# Patient Record
Sex: FEMALE | Race: WHITE | NOT HISPANIC OR LATINO | Employment: OTHER | URBAN - METROPOLITAN AREA
[De-identification: names, ages, dates, MRNs, and addresses within clinical notes are randomized per-mention and may not be internally consistent; named-entity substitution may affect disease eponyms.]

---

## 2017-06-29 ENCOUNTER — TRANSFERRED RECORDS (OUTPATIENT)
Dept: HEALTH INFORMATION MANAGEMENT | Facility: CLINIC | Age: 77
End: 2017-06-29

## 2017-07-10 ENCOUNTER — OFFICE VISIT (OUTPATIENT)
Dept: UROLOGY | Facility: CLINIC | Age: 77
End: 2017-07-10
Payer: MEDICARE

## 2017-07-10 VITALS
HEIGHT: 59 IN | WEIGHT: 115 LBS | DIASTOLIC BLOOD PRESSURE: 64 MMHG | BODY MASS INDEX: 23.18 KG/M2 | HEART RATE: 60 BPM | SYSTOLIC BLOOD PRESSURE: 120 MMHG

## 2017-07-10 DIAGNOSIS — R30.0 DYSURIA: ICD-10-CM

## 2017-07-10 DIAGNOSIS — R30.0 DYSURIA: Primary | ICD-10-CM

## 2017-07-10 LAB
ALBUMIN UR-MCNC: NEGATIVE MG/DL
APPEARANCE UR: CLEAR
BILIRUB UR QL STRIP: NEGATIVE
COLOR UR AUTO: YELLOW
GLUCOSE UR STRIP-MCNC: NEGATIVE MG/DL
HGB UR QL STRIP: ABNORMAL
KETONES UR STRIP-MCNC: NEGATIVE MG/DL
LEUKOCYTE ESTERASE UR QL STRIP: ABNORMAL
NITRATE UR QL: NEGATIVE
PH UR STRIP: 5.5 PH (ref 5–7)
RESIDUAL VOLUME (RV) (EXTERNAL): 50
SP GR UR STRIP: <=1.005 (ref 1–1.03)
URN SPEC COLLECT METH UR: ABNORMAL
UROBILINOGEN UR STRIP-ACNC: 0.2 EU/DL (ref 0.2–1)

## 2017-07-10 PROCEDURE — 99212 OFFICE O/P EST SF 10 MIN: CPT | Mod: 25 | Performed by: PHYSICIAN ASSISTANT

## 2017-07-10 PROCEDURE — 51798 US URINE CAPACITY MEASURE: CPT | Performed by: PHYSICIAN ASSISTANT

## 2017-07-10 PROCEDURE — 81003 URINALYSIS AUTO W/O SCOPE: CPT | Performed by: PHYSICIAN ASSISTANT

## 2017-07-10 PROCEDURE — 87086 URINE CULTURE/COLONY COUNT: CPT | Performed by: PHYSICIAN ASSISTANT

## 2017-07-10 RX ORDER — PHENAZOPYRIDINE HYDROCHLORIDE 100 MG/1
200 TABLET, FILM COATED ORAL 3 TIMES DAILY PRN
Qty: 12 TABLET | Refills: 0 | Status: SHIPPED | OUTPATIENT
Start: 2017-07-10 | End: 2022-01-09

## 2017-07-10 ASSESSMENT — PAIN SCALES - GENERAL: PAINLEVEL: MILD PAIN (3)

## 2017-07-10 NOTE — MR AVS SNAPSHOT
"              After Visit Summary   7/10/2017    Maria E Webster    MRN: [id number]           Patient Information     Date Of Birth          1940        Visit Information        Provider Department      7/10/2017 10:30 AM Kristy Irby PA-C [facility] [place]        Today's Diagnoses     Dysuria           Follow-ups after your visit        Who to contact     If you have questions or need follow up information about today's clinic visit or your schedule please contact [facility] [place] directly at [phone].  Normal or non-critical lab and imaging results will be communicated to you by Architurnhart, letter or phone within 4 business days after the clinic has received the results. If you do not hear from us within 7 days, please contact the clinic through CricHQt or phone. If you have a critical or abnormal lab result, we will notify you by phone as soon as possible.  Submit refill requests through Gr[employer] Eloise or call your pharmacy and they will forward the refill request to us. Please allow 3 business days for your refill to be completed.          Additional Information About Your Visit        MyChart Information     [employer] lets you send messages to your doctor, view your test results, renew your prescriptions, schedule appointments and more. To sign up, go to [email or web address] . Click on \"Log in\" on the left side of the screen, which will take you to the Welcome page. Then click on \"Sign up Now\" on the right side of the page.     You will be asked to enter the access code listed below, as well as some personal information. Please follow the directions to create your username and password.     Your access code is: [email or web address]  Expires: 10/8/2017 11:38 AM     Your access code will  in 90 days. If you need help or a new code, please call your [place] clinic or [phone].        Care EveryWhere ID     This is your Care EveryWhere " "ID. This could be used by other organizations to access your [facility] medical records  [id number]        Your Vitals Were     Pulse Height BMI (Body Mass Index)             60 1.499 m (4' 11\") 23.23 kg/m2          Blood Pressure from Last 3 Encounters:   07/10/17 120/64   05/09/12 120/60   08/27/03 132/80    Weight from Last 3 Encounters:   07/10/17 52.2 kg (115 lb)   05/09/12 56 kg (123 lb 7.3 oz)   08/27/03 58.7 kg (129 lb 8 oz)              We Performed the Following     MEASURE POST-VOID RESIDUAL URINE/BLADDER CAPACITY, US NON-IMAGING (01193)     UA without Microscopic [[id number]]     Urine Culture Aerobic Bacterial          Today's Medication Changes          These changes are accurate as of: 7/10/17 11:38 AM.  If you have any questions, ask your nurse or doctor.               Start taking these medicines.        Dose/Directions    phenazopyridine 100 MG tablet   Commonly known as:  PYRIDIUM   Used for:  Dysuria   Started by:  Kristy Irby PA-C        Dose:  200 mg   Take 2 tablets (200 mg) by mouth 3 times daily as needed for urinary tract discomfort   Quantity:  12 tablet   Refills:  0            Where to get your medicines      These medications were sent to [facility] [address] [place] [address] & [address], [place] [address]     Phone:  [phone]     phenazopyridine 100 MG tablet                Primary Care Provider Office Phone # Fax #    Hernandez Mena [phone]       [facility] [address] [address]        Equal Access to Services     WILLAM KOVACS AH: Hadii marialuisa Regalado, wabelindada luqadaha, qaybta kaalmada tatum, kayce galeano. So [facility] [phone].    ATENCIÓN: Si habla español, tiene a clements disposición servicios gratuitos de asistencia lingüística. Llame al [phone].    We comply with applicable federal civil rights laws and " [place] laws. We do not discriminate on the basis of race, color, national origin, age, disability sex, sexual orientation or gender identity.            Thank you!     Thank you for choosing [facility] [place]  for your care. Our goal is always to provide you with excellent care. Hearing back from our patients is one way we can continue to improve our services. Please take a few minutes to complete the written survey that you may receive in the mail after your visit with us. Thank you!             Your Updated Medication List - Protect others around you: Learn how to safely use, store and throw away your medicines at www.disposemymeds.org.          This list is accurate as of: 7/10/17 11:38 AM.  Always use your most recent med list.                   Brand Name Dispense Instructions for use Diagnosis    ASPIRIN EC PO      Take 81 mg by mouth.        CITRACAL + D 315-200 MG-UNIT Tabs per tablet   Generic drug:  calcium citrate-vitamin D      650mg DAILY        COLACE 100 MG capsule   Generic drug:  docusate sodium      Take 100 mg by mouth 2 times daily.        ESTRACE VAGINAL 0.1 MG/GM cream   Generic drug:  estradiol     42.5 g    Use dime size amount to vagina nightly for 2 weeks and then M/W/F night thereafter        METOPROLOL TARTRATE      12.5 mg 2 times daily.        Multi-vitamin Tabs tablet   Generic drug:  multivitamin, therapeutic with minerals      Take 1 tablet by mouth daily.        mycophenolate tablet      Take 1,000 mg by mouth 2 times daily.        OMEPRAZOLE      40 mg daily.        phenazopyridine 100 MG tablet    PYRIDIUM    12 tablet    Take 2 tablets (200 mg) by mouth 3 times daily as needed for urinary tract discomfort    Dysuria       polyethylene glycol Packet    MIRALAX/GLYCOLAX     Take 1 packet by mouth daily as needed.        prednisoLONE 5 MG tablet      Take 1 tablet by mouth daily.        sennosides 8.6 MG tablet    SENOKOT     Take 1 tablet by  mouth daily as needed.        simvastatin 20 MG tablet    ZOCOR     Take 10 mg by mouth At Bedtime        vitamin D 400 UNITS capsule      Take 1 capsule by mouth daily.

## 2017-07-10 NOTE — LETTER
7/10/2017       RE: Maria E Webster  [address]  [facility] [address]     Dear Colleague,    Thank you for referring your patient, Maria E Webster, to the [facility] [place] at [facility]. Please see a copy of my visit note below.    CC: UTI symptoms, overdue for f/u with Dr. Nagel    HPI: Patient follows with Dr. Nagel for a history of Susan and hematuria.  She had a cysto at her last visit. Urine cytology was negative at that time. She [place] closely with [place] [facility]. Cr 0.58 3/14/17.  She denies any major changes in her health since last visit.    Today she is seen for an interim visit due to dysuria.   Had a UTI diagnosed as outpt and was placed on Cipro x 3 days. Symptoms continued and was treated with Bactrim course completed last Friday (14 days). No fevers or chills. Continues to have pain at the end of her stream and some bladder cramping. Continues to see blood in her urine. GYN removed pessary two weeks ago to see if there would be improvement of her symptoms. No change and she is having it replaced this afternoon.       Past Medical History:   Diagnosis Date     Arthritis      Diverticulosis of colon (without mention of hemorrhage)      Hernia, abdominal      Polycystic kidney, unspecified type     Kidney transplant     PONV (postoperative nausea and vomiting)      Rectocele      Unspecified essential hypertension      Past Surgical History:   Procedure Laterality Date     C TRANSPLANTATION OF KIDNEY      left     C VAGINAL HYSTERECTOMY  1996    prolapse uterus, bilat. S&O     CYSTOSCOPY       HC COLONOSCOPY THRU STOMA, DIAGNOSTIC  5-03    few diverticulae     HC CORRECT BUNION,SIMPLE       HEMORRHOIDECTOMY       HERNIA REPAIR       TYMPANOPLASTY  5/9/2012    Procedure:TYMPANOPLASTY; Left Tympanoplasty ; Surgeon:BROOKE SANCHEZ; Location:[facility] OR        Allergies   Allergen Reactions     Codeine Nausea and  Vomiting     Darvocet [Propoxyphene N-Apap] Nausea and Vomiting     Morphine Nausea and Vomiting     Current Outpatient Prescriptions   Medication     estradiol (ESTRACE VAGINAL) 0.1 MG/GM vaginal cream     METOPROLOL TARTRATE     mycophenolate (CELLCEPT-BRAND NAME) 500 MG tablet     prednisoLONE 5 MG TABS     simvastatin (ZOCOR) 20 MG tablet     OMEPRAZOLE     ASPIRIN EC PO     Multiple Vitamin (MULTI-VITAMIN) per tablet     polyethylene glycol (MIRALAX/GLYCOLAX) packet     docusate sodium (COLACE) 100 MG capsule     sennosides (SENOKOT) 8.6 MG tablet     Cholecalciferol (VITAMIN D) 400 UNIT capsule     CITRACAL + D 315-200 MG-IU OR TABS     No current facility-administered medications for this visit.      GEN: NAD,  HEENT: EOMI  NECK: Supple  ABD: Obese, soft  EXT: No LE edema    PVR 50cc    A/P: 75 year old F with history notable for PCKD s/p renal transplant, s/p hysterectomy vault prolapse, atrophic vaginitis, Susan and gross hematuria, now with frequency and burning.    -U/A, U/C (declined cath specimen and is not currently using pessary). We discussed the findings from the evaluation did not show any obvious source of infection we would observe at this time. Short course of pyridium.   -Overdue to see Dr. Nagel, schedule today.     She was reminded of the importance of submitting a urine again in the future if she has an infection, preferably a catheterized specimen to avoid contamination from her pessary (GYN replacing it today).     Kristy Irby PA-C  [facility]  20 min spent with the patient in a face-to-face manner, >50% of the time spent on counseling and coordination of care.      CC  Patient Care Team:  Hernandez Mena MD as PCP - General (Nephrology)  [facility] transplant team    Again, thank you for allowing me to participate in the care of your patient.      Sincerely,    Kristy Irby PA-C, NITISH

## 2017-07-10 NOTE — PROGRESS NOTES
CC: UTI symptoms, overdue for f/u with Dr. Nagel    HPI: Patient follows with Dr. Nagel for a history of Susan and hematuria.  She had a cysto at her last visit. Urine cytology was negative at that time. [place] [facility] Cr 0.58 3/14/17.  She denies any major changes in her health since last visit.    Today she is seen for an interim visit due to dysuria.   Had a UTI diagnosed as outpt and was placed on Cipro x 3 days. Symptoms continued and was treated with Bactrim course completed last Friday (14 days). No fevers or chills. Continues to have pain at the end of her stream and some bladder cramping. Continues to see blood in her urine. GYN removed pessary two weeks ago to see if there would be improvement of her symptoms. No change and she is having it replaced this afternoon.       Past Medical History:   Diagnosis Date     Arthritis      Diverticulosis of colon (without mention of hemorrhage)      Hernia, abdominal      Polycystic kidney, unspecified type     Kidney transplant     PONV (postoperative nausea and vomiting)      Rectocele      Unspecified essential hypertension      Past Surgical History:   Procedure Laterality Date     C TRANSPLANTATION OF KIDNEY      left     C VAGINAL HYSTERECTOMY  1996    prolapse uterus, bilat. S&O     CYSTOSCOPY       HC COLONOSCOPY THRU STOMA, DIAGNOSTIC  5-03    few diverticulae     HC CORRECT BUNION,SIMPLE       HEMORRHOIDECTOMY       HERNIA REPAIR       TYMPANOPLASTY  5/9/2012    Procedure:TYMPANOPLASTY; Left Tympanoplasty ; Surgeon:BROOKE SANCHEZ; Location:[facility] OR        Allergies   Allergen Reactions     Codeine Nausea and Vomiting     Darvocet [Propoxyphene N-Apap] Nausea and Vomiting     Morphine Nausea and Vomiting     Current Outpatient Prescriptions   Medication     estradiol (ESTRACE VAGINAL) 0.1 MG/GM vaginal cream     METOPROLOL TARTRATE     mycophenolate (CELLCEPT-BRAND NAME) 500 MG tablet     prednisoLONE 5 MG TABS      simvastatin (ZOCOR) 20 MG tablet     OMEPRAZOLE     ASPIRIN EC PO     Multiple Vitamin (MULTI-VITAMIN) per tablet     polyethylene glycol (MIRALAX/GLYCOLAX) packet     docusate sodium (COLACE) 100 MG capsule     sennosides (SENOKOT) 8.6 MG tablet     Cholecalciferol (VITAMIN D) 400 UNIT capsule     CITRACAL + D 315-200 MG-IU OR TABS     No current facility-administered medications for this visit.      GEN: NAD,  HEENT: EOMI  NECK: Supple  ABD: Obese, soft  EXT: No LE edema    PVR 50cc    A/P: 75 year old F with history notable for PCKD s/p renal transplant, s/p hysterectomy vault prolapse, atrophic vaginitis, Susan and gross hematuria, now with frequency and burning.    -U/A, U/C (declined cath specimen and is not currently using pessary). We discussed the findings from the evaluation did not show any obvious source of infection we would observe at this time. Short course of pyridium.   -Overdue to see Dr. Nagel, schedule today.     She was reminded of the importance of submitting a urine again in the future if she has an infection, preferably a catheterized specimen to avoid contamination from her pessary (GYN replacing it today).     Kristy Irby PA-C  [facility]  20 min spent with the patient in a face-to-face manner, >50% of the time spent on counseling and coordination of care.      CC  Patient Care Team:  Hernandez Mena MD as PCP - General (Nephrology)  Hernandez Mena MD as MD (Nephrology)  [facility] transplant team

## 2017-07-10 NOTE — LETTER
7/10/2017      RE: Maria E Webster  [address] [place] [address]       CC: UTI symptoms, overdue for f/u with Dr. Nagel    HPI: Patient follows with Dr. Nagel for a history of Susan and hematuria.  She had a cysto at her last visit. Urine cytology was negative at that time. She follows closely with her renal transplant team at [facility]. Cr 0.58 3/14/17.  She denies any major changes in her health since last visit.    Today she is seen for an interim visit due to dysuria.   Had a UTI diagnosed as outpt and was placed on Cipro x 3 days. Symptoms continued and was treated with Bactrim course completed last Friday (14 days). No fevers or chills. Continues to have pain at the end of her stream and some bladder cramping. Continues to see blood in her urine. GYN removed pessary two weeks ago to see if there would be improvement of her symptoms. No change and she is having it replaced this afternoon.       Past Medical History:   Diagnosis Date     Arthritis      Diverticulosis of colon (without mention of hemorrhage)      Hernia, abdominal      Polycystic kidney, unspecified type     Kidney transplant     PONV (postoperative nausea and vomiting)      Rectocele      Unspecified essential hypertension      Past Surgical History:   Procedure Laterality Date     C TRANSPLANTATION OF KIDNEY      left     C VAGINAL HYSTERECTOMY  1996    prolapse uterus, bilat. S&O     CYSTOSCOPY       HC COLONOSCOPY THRU STOMA, DIAGNOSTIC  5-03    few diverticulae     HC CORRECT BUNION,SIMPLE       HEMORRHOIDECTOMY       HERNIA REPAIR       TYMPANOPLASTY  5/9/2012    Procedure:TYMPANOPLASTY; Left Tympanoplasty ; Surgeon:BROOKE SANCHEZ; Location:[facility] OR        Allergies   Allergen Reactions     Codeine Nausea and Vomiting     Darvocet [Propoxyphene N-Apap] Nausea and Vomiting     Morphine Nausea and Vomiting     Current Outpatient Prescriptions   Medication     estradiol (ESTRACE VAGINAL) 0.1 MG/GM vaginal cream     METOPROLOL TARTRATE      mycophenolate (CELLCEPT-BRAND NAME) 500 MG tablet     prednisoLONE 5 MG TABS     simvastatin (ZOCOR) 20 MG tablet     OMEPRAZOLE     ASPIRIN EC PO     Multiple Vitamin (MULTI-VITAMIN) per tablet     polyethylene glycol (MIRALAX/GLYCOLAX) packet     docusate sodium (COLACE) 100 MG capsule     sennosides (SENOKOT) 8.6 MG tablet     Cholecalciferol (VITAMIN D) 400 UNIT capsule     CITRACAL + D 315-200 MG-IU OR TABS     No current facility-administered medications for this visit.      GEN: NAD,  HEENT: EOMI  NECK: Supple  ABD: Obese, soft  EXT: No LE edema    PVR 50cc    A/P: 75 year old F with history notable for PCKD s/p renal transplant, s/p hysterectomy vault prolapse, atrophic vaginitis, Susan and gross hematuria, now with frequency and burning.    -U/A, U/C (declined cath specimen and is not currently using pessary). We discussed the findings from the evaluation did not show any obvious source of infection we would observe at this time. Short course of pyridium.   -Overdue to see Dr. Nagel, schedule today.     She was reminded of the importance of submitting a urine again in the future if she has an infection, preferably a catheterized specimen to avoid contamination from her pessary (GYN replacing it today).     Kristy Irby PA-C  [facility]  20 min spent with the patient in a face-to-face manner, >50% of the time spent on counseling and coordination of care.      CC  Patient Care Team:  Hernandez Mena MD as PCP - General (Nephrology)  [facility] transplant team

## 2017-07-11 LAB
BACTERIA SPEC CULT: NORMAL
Lab: NORMAL
MICRO REPORT STATUS: NORMAL
SPECIMEN SOURCE: NORMAL

## 2017-07-19 ENCOUNTER — OFFICE VISIT (OUTPATIENT)
Dept: UROLOGY | Facility: CLINIC | Age: 77
End: 2017-07-19
Payer: MEDICARE

## 2017-07-19 VITALS
WEIGHT: 115 LBS | BODY MASS INDEX: 23.18 KG/M2 | HEIGHT: 59 IN | SYSTOLIC BLOOD PRESSURE: 108 MMHG | HEART RATE: 70 BPM | DIASTOLIC BLOOD PRESSURE: 62 MMHG

## 2017-07-19 DIAGNOSIS — Z87.448 HISTORY OF HEMATURIA: ICD-10-CM

## 2017-07-19 DIAGNOSIS — Q61.3 POLYCYSTIC KIDNEY: ICD-10-CM

## 2017-07-19 DIAGNOSIS — Z87.440 PERSONAL HISTORY OF URINARY TRACT INFECTION: Primary | ICD-10-CM

## 2017-07-19 DIAGNOSIS — R32 UNSPECIFIED URINARY INCONTINENCE: ICD-10-CM

## 2017-07-19 DIAGNOSIS — Z94.0 STATUS POST KIDNEY TRANSPLANT: ICD-10-CM

## 2017-07-19 DIAGNOSIS — N81.9 VAGINAL VAULT PROLAPSE: ICD-10-CM

## 2017-07-19 LAB
ALBUMIN UR-MCNC: NEGATIVE MG/DL
APPEARANCE UR: CLEAR
BILIRUB UR QL STRIP: NEGATIVE
COLOR UR AUTO: YELLOW
GLUCOSE UR STRIP-MCNC: NEGATIVE MG/DL
HGB UR QL STRIP: NEGATIVE
KETONES UR STRIP-MCNC: NEGATIVE MG/DL
LEUKOCYTE ESTERASE UR QL STRIP: NEGATIVE
NITRATE UR QL: NEGATIVE
PH UR STRIP: 6 PH (ref 5–7)
SP GR UR STRIP: 1.02 (ref 1–1.03)
URN SPEC COLLECT METH UR: NORMAL
UROBILINOGEN UR STRIP-ACNC: 0.2 EU/DL (ref 0.2–1)

## 2017-07-19 PROCEDURE — 81003 URINALYSIS AUTO W/O SCOPE: CPT | Performed by: UROLOGY

## 2017-07-19 PROCEDURE — 88112 CYTOPATH CELL ENHANCE TECH: CPT | Performed by: UROLOGY

## 2017-07-19 PROCEDURE — 99213 OFFICE O/P EST LOW 20 MIN: CPT | Performed by: UROLOGY

## 2017-07-19 PROCEDURE — 88112 CYTOPATH CELL ENHANCE TECH: CPT | Mod: 26 | Performed by: UROLOGY

## 2017-07-19 ASSESSMENT — PAIN SCALES - GENERAL: PAINLEVEL: NO PAIN (0)

## 2017-07-19 NOTE — NURSING NOTE
Chief Complaint   Patient presents with     UTI     Patient is here for a follow up for recurrent UTI's in the past. Patient said she had a little burning this morning with urination      Brennen Young LPN 11:57 AM July 19, 2017

## 2017-07-19 NOTE — PROGRESS NOTES
"July 19, 2017    Return visit    Patient returns today for follow up.  She was last seen by me in 1/2016 for cystoscopy for gross hematuria which did not reveal anything remarkable.  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  She was doing well until about one month ago when she began to have UTI symptoms and gross hematuria-urine culture was positive for E coli. Does note she decreased the frequency of the vaginal estrogen cream to only once a week.   Despite being on the appropriate antibiotics she states that it took another round of antibiotics to make her symptoms go away.  States that the specimens were voided, sometimes in a hat.  She states that currently after a couple days of pyridium a little over a week ago the only persistent symptom is dysuria with her first AM void, rarely during other parts of the day.  No further hematuria but she is concerned because her sister had bladder cancer. She denies any other changes in her health since last visit.    /62  Pulse 70  Ht 1.499 m (4' 11\")  Wt 52.2 kg (115 lb)  Breastfeeding? No  BMI 23.23 kg/m2  She is comfortable, in no distress, non-labored breathing.      Catheterized urine specimen negative    A/P: 76 year old F with PCKD s/p renal transplant, post hysterectomy vault prolapse, atrophic vaginitis, Susan and gross hematuria    Given the hematuria and family history of bladder cancer a urine cytology was sent today    We discussed repeat cystoscopy today and she wishes to hold off until the urine cytology results return    Discussed that since the only symptom is first thing in the AM she can try to see if not letting her bladder get so full overnight (ie wake up a little earlier to void)  may help that sensation    She is asked to increase her vaginal estrogen cream back to 2x a week    RTC 3-4 months, sooner if cytology abnormal or if needed (will schedule for a possible cystoscopy)    15 minutes were spent with " the patient today, > 50% in counseling and coordination of care    Vira Nagel MD MPH   of Urology    CC  Patient Care Team:  Hernandez Mena MD as PCP - General (Nephrology)  Hernandez Mena MD as MD (Nephrology)

## 2017-07-19 NOTE — PATIENT INSTRUCTIONS
CATHETERIZED urine if concern for infection    We will let you know if your urine test is concerning for needing more tests    Estrace cream 2x a week    Return to see us 3-4 months, sooner if needed for possible cystoscopy

## 2017-07-19 NOTE — LETTER
"7/19/2017       RE: Maria E Webster  [address]  [facility] [address]     Dear Colleague,    Thank you for referring your patient, Maria E Webster, to the [facility] [place] at [facility]. Please see a copy of my visit note below.    July 19, 2017    Return visit    Patient returns today for follow up.  She was last seen by me in 1/2016 for cystoscopy for gross hematuria which did not reveal anything remarkable.  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  She was doing well until about one month ago when she began to have UTI symptoms and gross hematuria-urine culture was positive for E coli. Does note she decreased the frequency of the vaginal estrogen cream to only once a week.   Despite being on the appropriate antibiotics she states that it took another round of antibiotics to make her symptoms go away.  States that the specimens were voided, sometimes in a hat.  She states that currently after a couple days of pyridium a little over a week ago the only persistent symptom is dysuria with her first AM void, rarely during other parts of the day.  No further hematuria but she is concerned because her sister had bladder cancer. She denies any other changes in her health since last visit.    /62  Pulse 70  Ht 1.499 m (4' 11\")  Wt 52.2 kg (115 lb)  Breastfeeding? No  BMI 23.23 kg/m2  She is comfortable, in no distress, non-labored breathing.      Catheterized urine specimen negative    A/P: 76 year old F with PCKD s/p renal transplant, post hysterectomy vault prolapse, atrophic vaginitis, Susan and gross hematuria    Given the hematuria and family history of bladder cancer a urine cytology was sent today    We discussed repeat cystoscopy today and she wishes to hold off until the urine cytology results return    Discussed that since the only symptom is first thing in the AM she can try to see if " not letting her bladder get so full overnight (ie wake up a little earlier to void)  may help that sensation    She is asked to increase her vaginal estrogen cream back to 2x a week    RTC 3-4 months, sooner if cytology abnormal or if needed (will schedule for a possible cystoscopy)    15 minutes were spent with the patient today, > 50% in counseling and coordination of care    Vira Nagel MD MPH   of Urology    CC  Patient Care Team:  Hernandez Mena MD as PCP - General (Nephrology)

## 2017-07-19 NOTE — MR AVS SNAPSHOT
"              After Visit Summary   7/19/2017    Maria E Webster    MRN: [id number]           Patient Information     Date Of Birth          1940        Visit Information        Provider Department      7/19/2017 11:45 AM Vira Nagel MD [facility]        Today's Diagnoses     Personal history of urinary tract infection    -  1    History of hematuria        Status post kidney transplant        Vaginal vault prolapse        Polycystic kidney        Unspecified urinary incontinence          Care Instructions    CATHETERIZED urine if concern for infection    We will let you know if your urine test is concerning for needing more tests    Estrace cream 2x a week    Return to see us 3-4 months, sooner if needed for possible cystoscopy          Follow-ups after your visit        Who to contact     If you have questions or need follow up information about today's clinic visit or your schedule please contact [facility] directly at [phone].  Normal or non-critical lab and imaging results will be communicated to you by MyChart, letter or phone within 4 business days after the clinic has received the results. If you do not hear from us within 7 days, please contact the clinic through MyChart or phone. If you have a critical or abnormal lab result, we will notify you by phone as soon as possible.  Submit refill requests through [employer] or call your pharmacy and they will forward the refill request to us. Please allow 3 business days for your refill to be completed.          Additional Information About Your Visit        MyChart Information     [employer] lets you send messages to your doctor, view your test results, renew your prescriptions, schedule appointments and more. To sign up, go to [email or web address] . Click on \"Log in\" on the left side of the screen, which will take you to the Welcome page. Then click on \"Sign up " "Now\" on the right side of the page.     You will be asked to enter the access code listed below, as well as some personal information. Please follow the directions to create your username and password.     Your access code is: [email or web address]  Expires: 10/8/2017 11:38 AM     Your access code will  in 90 days. If you need help or a new code, please call your [facility] clinic or [phone].        Care EveryWhere ID     This is your Care EveryWhere ID. This could be used by other organizations to access your [facility] medical records  [id number]        Your Vitals Were     Pulse Height Breastfeeding? BMI (Body Mass Index)          70 1.499 m (4' 11\") No 23.23 kg/m2         Blood Pressure from Last 3 Encounters:   17 108/62   07/10/17 120/64   12 120/60    Weight from Last 3 Encounters:   17 52.2 kg (115 lb)   07/10/17 52.2 kg (115 lb)   12 56 kg (123 lb 7.3 oz)              We Performed the Following     Cytology non gyn     UA without Microscopic        Primary Care Provider Office Phone # Fax #    Hernandez Mena [phone]       [facility] [address] [address]        Equal Access to Services     ALYCIA KOVACS : Hadii aad ku hadasho Soolu, waaxda luqadaha, qaybta kaalmada adelibbyda, kayce jameson . So [facility] [phone].    ATENCIÓN: Si habla español, tiene a clements disposición servicios gratuitos de asistencia lingüística. Llame al [phone].    We comply with applicable federal civil rights laws and [place] laws. We do not discriminate on the basis of race, color, national origin, age, disability sex, sexual orientation or gender identity.            Thank you!     Thank you for choosing [facility]  for your care. Our goal is always to provide you with excellent care. Hearing back from our patients is one way we can continue to improve our services. " Please take a few minutes to complete the written survey that you may receive in the mail after your visit with us. Thank you!             Your Updated Medication List - Protect others around you: Learn how to safely use, store and throw away your medicines at www.disposemymeds.org.          This list is accurate as of: 7/19/17 12:23 PM.  Always use your most recent med list.                   Brand Name Dispense Instructions for use Diagnosis    ASPIRIN EC PO      Take 81 mg by mouth.        CITRACAL + D 315-200 MG-UNIT Tabs per tablet   Generic drug:  calcium citrate-vitamin D      650mg DAILY        COLACE 100 MG capsule   Generic drug:  docusate sodium      Take 100 mg by mouth 2 times daily.        ESTRACE VAGINAL 0.1 MG/GM cream   Generic drug:  estradiol     42.5 g    Use dime size amount to vagina nightly for 2 weeks and then M/W/F night thereafter        METOPROLOL TARTRATE      12.5 mg 2 times daily.        Multi-vitamin Tabs tablet   Generic drug:  multivitamin, therapeutic with minerals      Take 1 tablet by mouth daily.        mycophenolate tablet      Take 1,000 mg by mouth 2 times daily.        OMEPRAZOLE      40 mg daily.        phenazopyridine 100 MG tablet    PYRIDIUM    12 tablet    Take 2 tablets (200 mg) by mouth 3 times daily as needed for urinary tract discomfort    Dysuria       polyethylene glycol Packet    MIRALAX/GLYCOLAX     Take 1 packet by mouth daily as needed.        prednisoLONE 5 MG tablet      Take 1 tablet by mouth daily.        sennosides 8.6 MG tablet    SENOKOT     Take 1 tablet by mouth daily as needed.        simvastatin 20 MG tablet    ZOCOR     Take 10 mg by mouth At Bedtime        vitamin D 400 UNITS capsule      Take 1 capsule by mouth daily.

## 2017-07-20 LAB — COPATH REPORT: NORMAL

## 2017-10-31 ENCOUNTER — HOSPITAL ENCOUNTER (OUTPATIENT)
Dept: MAMMOGRAPHY | Facility: CLINIC | Age: 77
Discharge: HOME | End: 2017-10-31
Attending: OBSTETRICS & GYNECOLOGY | Admitting: OBSTETRICS & GYNECOLOGY
Payer: MEDICARE

## 2017-10-31 DIAGNOSIS — Z12.31 VISIT FOR SCREENING MAMMOGRAM: ICD-10-CM

## 2017-10-31 PROCEDURE — G0202 SCR MAMMO BI INCL CAD: HCPCS

## 2018-01-24 ENCOUNTER — OFFICE VISIT (OUTPATIENT)
Dept: PODIATRY | Facility: CLINIC | Age: 78
End: 2018-01-24
Payer: MEDICARE

## 2018-01-24 ENCOUNTER — RADIANT APPOINTMENT (OUTPATIENT)
Dept: GENERAL RADIOLOGY | Facility: CLINIC | Age: 78
End: 2018-01-24
Attending: PODIATRIST
Payer: MEDICARE

## 2018-01-24 VITALS
DIASTOLIC BLOOD PRESSURE: 74 MMHG | BODY MASS INDEX: 23.18 KG/M2 | HEIGHT: 59 IN | SYSTOLIC BLOOD PRESSURE: 110 MMHG | WEIGHT: 115 LBS

## 2018-01-24 DIAGNOSIS — M19.071 PRIMARY LOCALIZED OSTEOARTHROSIS OF RIGHT ANKLE AND FOOT: ICD-10-CM

## 2018-01-24 DIAGNOSIS — M21.42 PES PLANUS OF BOTH FEET: ICD-10-CM

## 2018-01-24 DIAGNOSIS — M79.671 RIGHT FOOT PAIN: ICD-10-CM

## 2018-01-24 DIAGNOSIS — M21.41 PES PLANUS OF BOTH FEET: ICD-10-CM

## 2018-01-24 DIAGNOSIS — M79.671 RIGHT FOOT PAIN: Primary | ICD-10-CM

## 2018-01-24 DIAGNOSIS — M20.41 HAMMER TOE OF RIGHT FOOT: ICD-10-CM

## 2018-01-24 DIAGNOSIS — M77.8 CAPSULITIS OF FOOT, RIGHT: ICD-10-CM

## 2018-01-24 PROCEDURE — 73630 X-RAY EXAM OF FOOT: CPT | Mod: RT

## 2018-01-24 PROCEDURE — 99203 OFFICE O/P NEW LOW 30 MIN: CPT | Performed by: PODIATRIST

## 2018-01-24 NOTE — LETTER
1/24/2018         RE: Maria E Webster  [address] [address]        Dear Colleague,    Thank you for referring your patient, Maria E Webster, to the [facility]. Please see a copy of my visit note below.    PATIENT HISTORY:    Maria E Webster is a 77 year old female who presents to clinic for pain to the right 2nd toe. Has been going on for about 2 weeks. Denies injury. Hurts when walking. Has tried icing. Pain can be 10/10. She would like to know what is causing the pain and what can be done for it.     Review of Systems:  Patient denies fever, chills, rash, wound, stiffness,numbness, weakness, heart burn, blood in stool, chest pain with activity, calf pain when walking, shortness of breath with activity, chronic cough, easy bleeding/bruising, swelling of ankles, excessive thirst, fatigue, depression, anxiety.  Patient admits to limping at times.     PAST MEDICAL HISTORY:   Past Medical History:   Diagnosis Date     Arthritis      Diverticulosis of colon (without mention of hemorrhage)      Hernia, abdominal      Polycystic kidney, unspecified type     Kidney transplant     PONV (postoperative nausea and vomiting)      Rectocele      Unspecified essential hypertension         PAST SURGICAL HISTORY:   Past Surgical History:   Procedure Laterality Date     C TRANSPLANTATION OF KIDNEY      left     C VAGINAL HYSTERECTOMY  1996    prolapse uterus, bilat. S&O     CYSTOSCOPY       HC COLONOSCOPY THRU STOMA, DIAGNOSTIC  5-03    few diverticulae     HC CORRECT BUNION,SIMPLE       HEMORRHOIDECTOMY       HERNIA REPAIR       TYMPANOPLASTY  5/9/2012    Procedure:TYMPANOPLASTY; Left Tympanoplasty ; Surgeon:BROOKE SANCHEZ; Location: OR        MEDICATIONS:   Current Outpatient Prescriptions:      phenazopyridine (PYRIDIUM) 100 MG tablet, Take 2 tablets (200 mg) by mouth 3 times daily as needed for urinary tract discomfort, Disp: 12 tablet, Rfl: 0     estradiol (ESTRACE VAGINAL) 0.1 MG/GM  vaginal cream, Use dime size amount to vagina nightly for 2 weeks and then M/W/F night thereafter, Disp: 42.5 g, Rfl: 3     METOPROLOL TARTRATE, 12.5 mg 2 times daily., Disp: , Rfl:      mycophenolate (CELLCEPT-BRAND NAME) 500 MG tablet, Take 1,000 mg by mouth 2 times daily., Disp: , Rfl:      prednisoLONE 5 MG TABS, Take 1 tablet by mouth daily., Disp: , Rfl:      simvastatin (ZOCOR) 20 MG tablet, Take 10 mg by mouth At Bedtime , Disp: , Rfl:      OMEPRAZOLE, 40 mg daily., Disp: , Rfl:      ASPIRIN EC PO, Take 81 mg by mouth., Disp: , Rfl:      Multiple Vitamin (MULTI-VITAMIN) per tablet, Take 1 tablet by mouth daily., Disp: , Rfl:      polyethylene glycol (MIRALAX/GLYCOLAX) packet, Take 1 packet by mouth daily as needed., Disp: , Rfl:      docusate sodium (COLACE) 100 MG capsule, Take 100 mg by mouth 2 times daily., Disp: , Rfl:      sennosides (SENOKOT) 8.6 MG tablet, Take 1 tablet by mouth daily as needed., Disp: , Rfl:      Cholecalciferol (VITAMIN D) 400 UNIT capsule, Take 1 capsule by mouth daily., Disp: , Rfl:      CITRACAL + D 315-200 MG-IU OR TABS, 650mg DAILY, Disp: , Rfl:      ALLERGIES:    Allergies   Allergen Reactions     Codeine Nausea and Vomiting     Darvocet [Propoxyphene N-Apap] Nausea and Vomiting     Morphine Nausea and Vomiting        SOCIAL HISTORY:   Social History     Social History     Marital status:      Spouse name: N/A     Number of children: 4     Years of education: N/A     Occupational History     [employer]      Social History Main Topics     Smoking status: Never Smoker     Smokeless tobacco: Never Used     Alcohol use Yes      Comment: 1 per week     Drug use: No     Sexual activity: Yes     Partners: Male     Other Topics Concern     Not on file     Social History Narrative        FAMILY HISTORY:   Family History   Problem Relation Age of Onset     Genitourinary Problems Father      polycystic kidneys in family     Depression Mother      Hypertension Mother   "    Depression Sister      Depression Sister      Depression Brother      Hypertension Sister         EXAM:Vitals: /74  Ht 4' 11\" (1.499 m)  Wt 115 lb (52.2 kg)  BMI 23.23 kg/m2      General appearance: Patient is alert and fully cooperative with history & exam.  No sign of distress is noted during the visit.     Psychiatric: Affect is pleasant & appropriate.  Patient appears motivated to improve health.     Respiratory: Breathing is regular & unlabored while sitting.     HEENT: Hearing is intact to spoken word.  Speech is clear.  No gross evidence of visual impairment that would impact ambulation.     Dermatologic: Skin is intact to both lower extremities without significant lesions, rash or abrasion.  No paronychia or evidence of soft tissue infection is noted.     Vascular: DP & PT pulses are intact & regular bilaterally.  No significant edema or varicosities noted.  CFT and skin temperature is normal to both lower extremities.     Neurologic: Lower extremity sensation is intact to light touch.  No evidence of weakness or contracture in the lower extremities.  No evidence of neuropathy.     Musculoskeletal: Patient is ambulatory without assistive device or brace.  Decrease arch height. Pain on palpation of plantar distal right 2nd metatarsal head. Semi rigid contracture of toes 2-4 bilateral. Decrease range of motion of right 1st metatarsal phalangeal joint.     Radiographs:  I personally reviewed the xrays. Previous surgery to 1st metatarsal. elongated 2nd and 3rd metatarsals. Degenerative changes noted to 1st metatarsal phalangeal joint. no fractures noted.      ASSESSMENT:    Right foot pain  Hammer toe of right foot  Capsulitis of foot, right  Pes planus of both feet     PLAN:  Reviewed patient's chart in [facility]. Reviewed xrays. Reviewed and discussed causes of capsulitis.  Talked about how the elongated 2nd metatarsal can cause more pressure to occur to the joint.  We talked about treatments such as " padding, orthotics, injection, physical therapy, immobilization, MRI, and possible surgery to shorten metatarsal.    Reviewed and discussed causes of hammertoes with patient.  Explained that this can be caused by an overpowering of muscles or by the way we walk.  Discussed conservative treatments such as orthotics, pads, shoe gear.  Explained that sometimes the flexor tendons can be cut to try and straighten the toe and reduce rubbing. This is normally done in office and patient is weight bearing in postop she for 1-2 weeks.  We also discussed surgical intervention to remove the joint and possibly fuse the toe.  Normally patient has a pin sticking out of the toe for about 6 weeks and can not get the foot wet. Patient would have to be minimal weight bearing in cam boot.      She is going to try over the counter inserts with metatarsal pad and topical anti inflammatories. Declined boot or injection at this time.        Brii Mayer DPM, Podiatry/Foot and Ankle Surgery          Again, thank you for allowing me to participate in the care of your patient.        Sincerely,        Brii Mayer DPM, Podiatry/Foot and Ankle Surgery

## 2018-01-24 NOTE — PROGRESS NOTES
PATIENT HISTORY:    Maria E Webster is a 77 year old female who presents to clinic for pain to the right 2nd toe. Has been going on for about 2 weeks. Denies injury. Hurts when walking. Has tried icing. Pain can be 10/10. She would like to know what is causing the pain and what can be done for it.     Review of Systems:  Patient denies fever, chills, rash, wound, stiffness,numbness, weakness, heart burn, blood in stool, chest pain with activity, calf pain when walking, shortness of breath with activity, chronic cough, easy bleeding/bruising, swelling of ankles, excessive thirst, fatigue, depression, anxiety.  Patient admits to limping at times.     PAST MEDICAL HISTORY:   Past Medical History:   Diagnosis Date     Arthritis      Diverticulosis of colon (without mention of hemorrhage)      Hernia, abdominal      Polycystic kidney, unspecified type     Kidney transplant     PONV (postoperative nausea and vomiting)      Rectocele      Unspecified essential hypertension         PAST SURGICAL HISTORY:   Past Surgical History:   Procedure Laterality Date     C TRANSPLANTATION OF KIDNEY      left     C VAGINAL HYSTERECTOMY  1996    prolapse uterus, bilat. S&O     CYSTOSCOPY       HC COLONOSCOPY THRU STOMA, DIAGNOSTIC  5-03    few diverticulae     HC CORRECT BUNION,SIMPLE       HEMORRHOIDECTOMY       HERNIA REPAIR       TYMPANOPLASTY  5/9/2012    Procedure:TYMPANOPLASTY; Left Tympanoplasty ; Surgeon:BROOKE SANCHEZ; Location: OR        MEDICATIONS:   Current Outpatient Prescriptions:      phenazopyridine (PYRIDIUM) 100 MG tablet, Take 2 tablets (200 mg) by mouth 3 times daily as needed for urinary tract discomfort, Disp: 12 tablet, Rfl: 0     estradiol (ESTRACE VAGINAL) 0.1 MG/GM vaginal cream, Use dime size amount to vagina nightly for 2 weeks and then M/W/F night thereafter, Disp: 42.5 g, Rfl: 3     METOPROLOL TARTRATE, 12.5 mg 2 times daily., Disp: , Rfl:      mycophenolate (CELLCEPT-BRAND NAME) 500 MG tablet, Take  "1,000 mg by mouth 2 times daily., Disp: , Rfl:      prednisoLONE 5 MG TABS, Take 1 tablet by mouth daily., Disp: , Rfl:      simvastatin (ZOCOR) 20 MG tablet, Take 10 mg by mouth At Bedtime , Disp: , Rfl:      OMEPRAZOLE, 40 mg daily., Disp: , Rfl:      ASPIRIN EC PO, Take 81 mg by mouth., Disp: , Rfl:      Multiple Vitamin (MULTI-VITAMIN) per tablet, Take 1 tablet by mouth daily., Disp: , Rfl:      polyethylene glycol (MIRALAX/GLYCOLAX) packet, Take 1 packet by mouth daily as needed., Disp: , Rfl:      docusate sodium (COLACE) 100 MG capsule, Take 100 mg by mouth 2 times daily., Disp: , Rfl:      sennosides (SENOKOT) 8.6 MG tablet, Take 1 tablet by mouth daily as needed., Disp: , Rfl:      Cholecalciferol (VITAMIN D) 400 UNIT capsule, Take 1 capsule by mouth daily., Disp: , Rfl:      CITRACAL + D 315-200 MG-IU OR TABS, 650mg DAILY, Disp: , Rfl:      ALLERGIES:    Allergies   Allergen Reactions     Codeine Nausea and Vomiting     Darvocet [Propoxyphene N-Apap] Nausea and Vomiting     Morphine Nausea and Vomiting        SOCIAL HISTORY:   Social History     Social History     Marital status:      Spouse name: N/A     Number of children: 4     Years of education: N/A     Occupational History     [employer]      Social History Main Topics     Smoking status: Never Smoker     Smokeless tobacco: Never Used     Alcohol use Yes      Comment: 1 per week     Drug use: No     Sexual activity: Yes     Partners: Male     Other Topics Concern     Not on file     Social History Narrative        FAMILY HISTORY:   Family History   Problem Relation Age of Onset     Genitourinary Problems Father      polycystic kidneys in family     Depression Mother      Hypertension Mother      Depression Sister      Depression Sister      Depression Brother      Hypertension Sister         EXAM:Vitals: /74  Ht 4' 11\" (1.499 m)  Wt 115 lb (52.2 kg)  BMI 23.23 kg/m2      General appearance: Patient is alert and fully " cooperative with history & exam.  No sign of distress is noted during the visit.     Psychiatric: Affect is pleasant & appropriate.  Patient appears motivated to improve health.     Respiratory: Breathing is regular & unlabored while sitting.     HEENT: Hearing is intact to spoken word.  Speech is clear.  No gross evidence of visual impairment that would impact ambulation.     Dermatologic: Skin is intact to both lower extremities without significant lesions, rash or abrasion.  No paronychia or evidence of soft tissue infection is noted.     Vascular: DP & PT pulses are intact & regular bilaterally.  No significant edema or varicosities noted.  CFT and skin temperature is normal to both lower extremities.     Neurologic: Lower extremity sensation is intact to light touch.  No evidence of weakness or contracture in the lower extremities.  No evidence of neuropathy.     Musculoskeletal: Patient is ambulatory without assistive device or brace.  Decrease arch height. Pain on palpation of plantar distal right 2nd metatarsal head. Semi rigid contracture of toes 2-4 bilateral. Decrease range of motion of right 1st metatarsal phalangeal joint.     Radiographs:  I personally reviewed the xrays. Previous surgery to 1st metatarsal. elongated 2nd and 3rd metatarsals. Degenerative changes noted to 1st metatarsal phalangeal joint. no fractures noted.      ASSESSMENT:    Right foot pain  Hammer toe of right foot  Capsulitis of foot, right  Pes planus of both feet     PLAN:  Reviewed patient's chart in [facility]. Reviewed xrays. Reviewed and discussed causes of capsulitis.  Talked about how the elongated 2nd metatarsal can cause more pressure to occur to the joint.  We talked about treatments such as padding, orthotics, injection, physical therapy, immobilization, MRI, and possible surgery to shorten metatarsal.    Reviewed and discussed causes of hammertoes with patient.  Explained that this can be caused by an overpowering of muscles  or by the way we walk.  Discussed conservative treatments such as orthotics, pads, shoe gear.  Explained that sometimes the flexor tendons can be cut to try and straighten the toe and reduce rubbing. This is normally done in office and patient is weight bearing in postop she for 1-2 weeks.  We also discussed surgical intervention to remove the joint and possibly fuse the toe.  Normally patient has a pin sticking out of the toe for about 6 weeks and can not get the foot wet. Patient would have to be minimal weight bearing in cam boot.      She is going to try over the counter inserts with metatarsal pad and topical anti inflammatories. Declined boot or injection at this time.        Brii Mayer DPM, Podiatry/Foot and Ankle Surgery

## 2018-01-24 NOTE — MR AVS SNAPSHOT
After Visit Summary   1/24/2018    Maria E Websetr    MRN: [id number]           Patient Information     Date Of Birth          1940        Visit Information        Provider Department      1/24/2018 1:00 PM Brii Mayer DPM, Podiatry/Foot and Ankle Surgery [facility]        Today's Diagnoses     Right foot pain    -  1      Care Instructions    Thank you for choosing [facility]!    DR. MAYER'S CLINIC LOCATIONS:   MONDAY AM - [place] TUESDAY - [place]   [address] [address] [place] [address] [place] [address]   [phone] / [phone] / [phone]       WEDNESDAY - [place] FRIDAY PM - [place]   [address]   [place] [address] [place] [address]   [phone] / [phone] / [phone]       SCHEDULE SURGERY: [phone]    APPOINTMENTS: [phone]    BILLING QUESTIONS: [phone]      HAMMERTOES  Hammertoe is a contracture (bending) of one or both joints of the second, third, fourth, or fifth (little) toes. This abnormal bending can put pressure on the toe when wearing shoes, causing problems to develop.  Hammertoes usually start out as mild deformities and get progressively worse over time. In the earlier stages, hammertoes are flexible and the symptoms can often be managed with noninvasive measures. But if left untreated, hammertoes can become more rigid and will not respond to non-surgical treatment.  Because of the progressive nature of hammertoes, they should receive early attention. Hammertoes never get better without some kind of intervention.  CAUSES  The most common cause of hammertoe is a muscle/tendon imbalance. This imbalance, which leads to a bending of the toe, results from mechanical (structural) changes in the foot that occur over time in some people.  Hammertoes may be aggravated by shoes that don t fit properly. A  hammertoe may result if a toe is too long and is forced into a cramped position when a tight shoe is worn.  Occasionally, hammertoe is the result of an earlier trauma to the toe. In some people, hammertoes are inherited.  SYMPTOMS  Pain or irritation of the affected toe when wearing shoes.   Corns and calluses (a buildup of skin) on the toe, between two toes, or on the ball of the foot. Corns are caused by constant friction against the shoe. They may be soft or hard, depending upon their location.   Inflammation, redness, or a burning sensation   Contracture of the toe   In more severe cases of hammertoe, open sores may form.   DIAGNOSIS  Although hammertoes are readily apparent, to arrive at a diagnosis the foot and ankle surgeon will obtain a thorough history of your symptoms and examine your foot. During the physical examination, the doctor may attempt to reproduce your symptoms by manipulating your foot and will study the contractures of the toes. In addition, the foot and ankle surgeon may take x-rays to determine the degree of the deformities and assess any changes that may have occurred.   Hammertoes are progressive - they don t go away by themselves and usually they will get worse over time. However, not all cases are alike - some hammertoes progress more rapidly than others. Once your foot and ankle surgeon has evaluated your hammertoes, a treatment plan can be developed that is suited to your needs.  NON-SURGICAL TREATMENT  There is a variety of treatment options for hammertoe. The treatment your foot and ankle surgeon selects will depend upon the severity of your hammertoe and other factors.  A number of non-surgical measures can be undertaken:  Padding corns and calluses. Your foot and ankle surgeon can provide or prescribe pads designed to shield corns from irritation. If you want to try over-the-counter pads, avoid the medicated types. Medicated pads are generally not recommended because they may  contain a small amount of acid that can be harmful. Consult your surgeon about this option.   Changes in shoewear. Avoid shoes with pointed toes, shoes that are too short, or shoes with high heels - conditions that can force your toe against the front of the shoe. Instead, choose comfortable shoes with a deep, roomy toe box and heels no higher than two inches.   Orthotic devices. A custom orthotic device placed in your shoe may help control the muscle/tendon imbalance.   Injection therapy. Corticosteroid injections are sometimes used to ease pain and inflammation caused by hammertoe.   Medications. Oral nonsteroidal anti-inflammatory drugs (NSAIDs), such as ibuprofen, may be recommended to reduce pain and inflammation.   Splinting/strapping. Splints or small straps may be applied by the surgeon to realign the bent toe.   Exercises:   1. The Toe Tap  Stand flat on the ground in your bare feet. Raise all of your toes up off the ground as high as you can. Then starting with the little toes, slowly press them down to the ground. After the big toes are on the ground, start over by raising all of them up off the ground again. This motion is similar to tapping your fingers on a desk. Repeat this ten times.     2. Interlocking your Fingers and Toes  Cross your right foot over your knee and place the fingers of your left hand between your toes. Squeeze your toes together, pinching your fingers between them. Spread the toes apart and squeeze them together again. Repeat this ten times then do the other foot. Like most exercises, this will get easier the more you do it. If you are having a lot of difficulty with this exercise, start with just your index finger between your first and second toe, then later add your middle finger between your second and third toes, and so on until you can fit all your fingers between your toes. Do this ten times on each foot. Eventually you will be able to spread your toes apart without using  your fingers.    3. Gripping the Floor   the floor by pressing the pads of your toes (not the tips) into the floor without curling your toes. Relax and repeat this ten times. If your shoes have the proper amount of depth, you should be able to do this with shoes on.    HAMMERTOE TOE SURGERY   Hammertoe surgery is complex. The surgical procedure is an attempt to help the toe lay in a better position. Nearly every structure in the toe will be cut including the tendons, ligaments, skin and bone. Hammertoes are a complex deformity and final toe position is difficult to predict. The only sure way to position a toe is to fuse all three digital joints. That will not happen as some degree of toe motion is needed for walking. The toe may not be completely reduced as the surrounding skin and other structures may not allow the toe to return to a normal position. The tendons on adjacent toes may need to be cut at the time of the original or subsequent surgeries, as interconnections exist between the toes. The toe may drift after surgery. Stiffness may develop leading to new areas of pressure.   Future shoe choices will be critical in allowing the surgery to provide comfort. The toes will still hurt if shoes rub. The original pain may also persist as other foot problems may be contributing to the current pain. The toe may or may not be pinned in place. External pins would require complete avoidance of water on the foot for six weeks. The pin would be removed about six weeks after the surgery. Strict attention to protection is critical. The pin could get bumped or loosen resulting in early removal. Removal might be necessary before the bone heals which would negatively affect the final surgical outcome and toe allignment.     CAPSULITIS / METATARSALGIA  All joints in the body are surrounded by a capsule, or a covering of soft tissue and ligaments. The capsule holds bones together and secretes joint fluid to help lubricate the  joint.  If a joint capsule is exposed to excessive force, it can develop microscopic tears and become inflamed. This commonly occurs in the foot due to mild variation in anatomy. Hammertoes, bunions, irregular bone length, joint immobility, etc. can all lead to excessive force on the joint. Capsule injury can also occur due to repetitive stress from exercise, insufficient support from shoes, excessive bare foot walking and excessive weight.      Conservative treatments include ice, rest from the aggravating activity, weight loss, orthotic inserts, improving shoes and shoe modifications. Appropriate shoes will protect the inflamed tissue improving the chances of healing. Avoidance of standing or walking barefoot, including around the house, is necessary to allow healing. Casts are sometimes used for more aggressive protection.  NSAIDs such as Advil are also used to help with pain and decreasing inflammation. If pain continues over a period of weeks with continuous rest and icing, Corticosteroid injections can be a treatment option to try and help decrease inflammation.    Surgery is often necessary to correct the underlying structural problem. Surgery might include shortening an excessively long bone, repairing bunion or hammertoe, lengthening a tight Achilles  tendon, etc. These are same day surgeries that might be pursued if more conservative measures fail to provide relief.      The inflamed joint capsule has the potential to completely tear. This will allow the toe to drift off the ground, curving toward the other toes. The involved toe may under or overlap the adjacent toes as drift continues. The pain may improve after the joint tears or this new position will be permanent. Surgery can address the toe alignment. Your goal of treating capsulitis is to avoid this scenario.              Body Mass Index (BMI)  Many things can cause foot and ankle problems. Foot structure, activity level, foot mechanics and injuries  "are common causes of pain.  One very important issue that often goes unmentioned, is body weight. Extra weight can cause increased stress on muscles, ligaments, bones and tendons.  Sometimes just a few extra pounds is all it takes to put one over her/his threshold. Without reducing that stress, it can be difficult to alleviate pain. Some people are uncomfortable addressing this issue, but we feel it is important for you to think about it. As Foot &  Ankle specialists, our job is addressing the lower extremity problem and possible causes. Regarding extra body weight, we encourage patients to discuss diet and weight management plans with their primary care doctors. It is this team approach that gives you the best opportunity for pain relief and getting you back on your feet.                  Follow-ups after your visit        Who to contact     If you have questions or need follow up information about today's clinic visit or your schedule please contact [facility] directly at [phone].  Normal or non-critical lab and imaging results will be communicated to you by [facility], letter or phone within 4 business days after the clinic has received the results. If you do not hear from us within 7 days, please contact the clinic through [facility] or phone. If you have a critical or abnormal lab result, we will notify you by phone as soon as possible.  Submit refill requests through [employer] or call your pharmacy and they will forward the refill request to us. Please allow 3 business days for your refill to be completed.          Additional Information About Your Visit        [employer] Information     [employer] lets you send messages to your doctor, view your test results, renew your prescriptions, schedule appointments and more. To sign up, go to [email or web address] . Click on \"Log in\" on the left side of the screen, which will take you to the Welcome page. Then click on \"Sign up Now\" on the right side of the " "page.     You will be asked to enter the access code listed below, as well as some personal information. Please follow the directions to create your username and password.     Your access code is: [email or web address]  Expires: 2018  1:32 PM     Your access code will  in 90 days. If you need help or a new code, please call your [place] clinic or [phone].        Care EveryWhere ID     This is your Care EveryWhere ID. This could be used by other organizations to access your [place] medical records  [id number]        Your Vitals Were     Height BMI (Body Mass Index)                4' 11\" (1.499 m) 23.23 kg/m2           Blood Pressure from Last 3 Encounters:   18 110/74   17 108/62   07/10/17 120/64    Weight from Last 3 Encounters:   18 115 lb (52.2 kg)   17 115 lb (52.2 kg)   07/10/17 115 lb (52.2 kg)               Primary Care Provider Office Phone # Fax #    Hernandez Mena [phone]       [facility] [address] [address]        Equal Access to Services     WILLAM KOVACS : Hadii marialuisa ku hadasho Solorenali, waaxda luqadaha, qaybta kaalmada adeegyada, waxay minh jameson . So [facility] [phone].    ATENCIÓN: Si habla español, tiene a clements disposición servicios gratuitos de asistencia lingüística. Llame al [phone].    We comply with applicable federal civil rights laws and [place] laws. We do not discriminate on the basis of race, color, national origin, age, disability, sex, sexual orientation, or gender identity.            Thank you!     Thank you for choosing [facility]  for your care. Our goal is always to provide you with excellent care. Hearing back from our patients is one way we can continue to improve our services. Please take a few minutes to complete the written survey that you may receive in the mail after your visit with us. Thank you!             Your Updated " Medication List - Protect others around you: Learn how to safely use, store and throw away your medicines at www.disposemymeds.org.          This list is accurate as of 1/24/18  1:33 PM.  Always use your most recent med list.                   Brand Name Dispense Instructions for use Diagnosis    ASPIRIN EC PO      Take 81 mg by mouth.        CITRACAL + D 315-200 MG-UNIT Tabs per tablet   Generic drug:  calcium citrate-vitamin D      650mg DAILY        COLACE 100 MG capsule   Generic drug:  docusate sodium      Take 100 mg by mouth 2 times daily.        ESTRACE VAGINAL 0.1 MG/GM cream   Generic drug:  estradiol     42.5 g    Use dime size amount to vagina nightly for 2 weeks and then M/W/F night thereafter        METOPROLOL TARTRATE      12.5 mg 2 times daily.        Multi-vitamin Tabs tablet   Generic drug:  multivitamin, therapeutic with minerals      Take 1 tablet by mouth daily.        mycophenolate 500 MG tablet      Take 1,000 mg by mouth 2 times daily.        OMEPRAZOLE      40 mg daily.        phenazopyridine 100 MG tablet    PYRIDIUM    12 tablet    Take 2 tablets (200 mg) by mouth 3 times daily as needed for urinary tract discomfort    Dysuria       polyethylene glycol Packet    MIRALAX/GLYCOLAX     Take 1 packet by mouth daily as needed.        prednisoLONE 5 MG tablet      Take 1 tablet by mouth daily.        sennosides 8.6 MG tablet    SENOKOT     Take 1 tablet by mouth daily as needed.        simvastatin 20 MG tablet    ZOCOR     Take 10 mg by mouth At Bedtime        vitamin D 400 UNITS capsule      Take 1 capsule by mouth daily.

## 2018-01-24 NOTE — NURSING NOTE
"Chief Complaint   Patient presents with     Foot Problems     pt concerned with possible hammertoe on her right 2nd toe, feels like it might have been swollen underneath x1.5weeks        Initial /74  Ht 4' 11\" (1.499 m)  Wt 115 lb (52.2 kg)  BMI 23.23 kg/m2 Estimated body mass index is 23.23 kg/(m^2) as calculated from the following:    Height as of this encounter: 4' 11\" (1.499 m).    Weight as of this encounter: 115 lb (52.2 kg).  Medication Reconciliation: complete   Darren Collins MA      "

## 2018-01-24 NOTE — PATIENT INSTRUCTIONS
Thank you for choosing [facility]!    DR. KIMBROUGH'S CLINIC LOCATIONS:   MONDAY AM - [place] TUESDAY - [place]   [address] [address] [address] [address] [place] [address]   [phone] / [phone] / [phone]       WEDNESDAY - [place] FRIDAY PM - [place]   [address]   [address] [address] [place] [id number] [phone] / [phone] / [phone]       SCHEDULE SURGERY: [phone]    APPOINTMENTS: [phone]    BILLING QUESTIONS: [phone]      HAMMERTOES  Hammertoe is a contracture (bending) of one or both joints of the second, third, fourth, or fifth (little) toes. This abnormal bending can put pressure on the toe when wearing shoes, causing problems to develop.  Hammertoes usually start out as mild deformities and get progressively worse over time. In the earlier stages, hammertoes are flexible and the symptoms can often be managed with noninvasive measures. But if left untreated, hammertoes can become more rigid and will not respond to non-surgical treatment.  Because of the progressive nature of hammertoes, they should receive early attention. Hammertoes never get better without some kind of intervention.  CAUSES  The most common cause of hammertoe is a muscle/tendon imbalance. This imbalance, which leads to a bending of the toe, results from mechanical (structural) changes in the foot that occur over time in some people.  Hammertoes may be aggravated by shoes that don t fit properly. A hammertoe may result if a toe is too long and is forced into a cramped position when a tight shoe is worn.  Occasionally, hammertoe is the result of an earlier trauma to the toe. In some people, hammertoes are inherited.  SYMPTOMS  Pain or irritation of the affected toe when wearing shoes.   Corns and calluses (a buildup of skin) on the toe, between two toes, or on the ball of the  foot. Corns are caused by constant friction against the shoe. They may be soft or hard, depending upon their location.   Inflammation, redness, or a burning sensation   Contracture of the toe   In more severe cases of hammertoe, open sores may form.   DIAGNOSIS  Although hammertoes are readily apparent, to arrive at a diagnosis the foot and ankle surgeon will obtain a thorough history of your symptoms and examine your foot. During the physical examination, the doctor may attempt to reproduce your symptoms by manipulating your foot and will study the contractures of the toes. In addition, the foot and ankle surgeon may take x-rays to determine the degree of the deformities and assess any changes that may have occurred.   Hammertoes are progressive - they don t go away by themselves and usually they will get worse over time. However, not all cases are alike - some hammertoes progress more rapidly than others. Once your foot and ankle surgeon has evaluated your hammertoes, a treatment plan can be developed that is suited to your needs.  NON-SURGICAL TREATMENT  There is a variety of treatment options for hammertoe. The treatment your foot and ankle surgeon selects will depend upon the severity of your hammertoe and other factors.  A number of non-surgical measures can be undertaken:  Padding corns and calluses. Your foot and ankle surgeon can provide or prescribe pads designed to shield corns from irritation. If you want to try over-the-counter pads, avoid the medicated types. Medicated pads are generally not recommended because they may contain a small amount of acid that can be harmful. Consult your surgeon about this option.   Changes in shoewear. Avoid shoes with pointed toes, shoes that are too short, or shoes with high heels - conditions that can force your toe against the front of the shoe. Instead, choose comfortable shoes with a deep, roomy toe box and heels no higher than two inches.   Orthotic devices. A  custom orthotic device placed in your shoe may help control the muscle/tendon imbalance.   Injection therapy. Corticosteroid injections are sometimes used to ease pain and inflammation caused by hammertoe.   Medications. Oral nonsteroidal anti-inflammatory drugs (NSAIDs), such as ibuprofen, may be recommended to reduce pain and inflammation.   Splinting/strapping. Splints or small straps may be applied by the surgeon to realign the bent toe.   Exercises:   1. The Toe Tap  Stand flat on the ground in your bare feet. Raise all of your toes up off the ground as high as you can. Then starting with the little toes, slowly press them down to the ground. After the big toes are on the ground, start over by raising all of them up off the ground again. This motion is similar to tapping your fingers on a desk. Repeat this ten times.     2. Interlocking your Fingers and Toes  Cross your right foot over your knee and place the fingers of your left hand between your toes. Squeeze your toes together, pinching your fingers between them. Spread the toes apart and squeeze them together again. Repeat this ten times then do the other foot. Like most exercises, this will get easier the more you do it. If you are having a lot of difficulty with this exercise, start with just your index finger between your first and second toe, then later add your middle finger between your second and third toes, and so on until you can fit all your fingers between your toes. Do this ten times on each foot. Eventually you will be able to spread your toes apart without using your fingers.    3. Gripping the Floor   the floor by pressing the pads of your toes (not the tips) into the floor without curling your toes. Relax and repeat this ten times. If your shoes have the proper amount of depth, you should be able to do this with shoes on.    HAMMERTOE TOE SURGERY   Hammertoe surgery is complex. The surgical procedure is an attempt to help the toe lay in  a better position. Nearly every structure in the toe will be cut including the tendons, ligaments, skin and bone. Hammertoes are a complex deformity and final toe position is difficult to predict. The only sure way to position a toe is to fuse all three digital joints. That will not happen as some degree of toe motion is needed for walking. The toe may not be completely reduced as the surrounding skin and other structures may not allow the toe to return to a normal position. The tendons on adjacent toes may need to be cut at the time of the original or subsequent surgeries, as interconnections exist between the toes. The toe may drift after surgery. Stiffness may develop leading to new areas of pressure.   Future shoe choices will be critical in allowing the surgery to provide comfort. The toes will still hurt if shoes rub. The original pain may also persist as other foot problems may be contributing to the current pain. The toe may or may not be pinned in place. External pins would require complete avoidance of water on the foot for six weeks. The pin would be removed about six weeks after the surgery. Strict attention to protection is critical. The pin could get bumped or loosen resulting in early removal. Removal might be necessary before the bone heals which would negatively affect the final surgical outcome and toe allignment.     CAPSULITIS / METATARSALGIA  All joints in the body are surrounded by a capsule, or a covering of soft tissue and ligaments. The capsule holds bones together and secretes joint fluid to help lubricate the joint.  If a joint capsule is exposed to excessive force, it can develop microscopic tears and become inflamed. This commonly occurs in the foot due to mild variation in anatomy. Hammertoes, bunions, irregular bone length, joint immobility, etc. can all lead to excessive force on the joint. Capsule injury can also occur due to repetitive stress from exercise, insufficient support from  shoes, excessive bare foot walking and excessive weight.      Conservative treatments include ice, rest from the aggravating activity, weight loss, orthotic inserts, improving shoes and shoe modifications. Appropriate shoes will protect the inflamed tissue improving the chances of healing. Avoidance of standing or walking barefoot, including around the house, is necessary to allow healing. Casts are sometimes used for more aggressive protection.  NSAIDs such as Advil are also used to help with pain and decreasing inflammation. If pain continues over a period of weeks with continuous rest and icing, Corticosteroid injections can be a treatment option to try and help decrease inflammation.    Surgery is often necessary to correct the underlying structural problem. Surgery might include shortening an excessively long bone, repairing bunion or hammertoe, lengthening a tight Achilles  tendon, etc. These are same day surgeries that might be pursued if more conservative measures fail to provide relief.      The inflamed joint capsule has the potential to completely tear. This will allow the toe to drift off the ground, curving toward the other toes. The involved toe may under or overlap the adjacent toes as drift continues. The pain may improve after the joint tears or this new position will be permanent. Surgery can address the toe alignment. Your goal of treating capsulitis is to avoid this scenario.              Body Mass Index (BMI)  Many things can cause foot and ankle problems. Foot structure, activity level, foot mechanics and injuries are common causes of pain.  One very important issue that often goes unmentioned, is body weight. Extra weight can cause increased stress on muscles, ligaments, bones and tendons.  Sometimes just a few extra pounds is all it takes to put one over her/his threshold. Without reducing that stress, it can be difficult to alleviate pain. Some people are uncomfortable addressing this issue,  but we feel it is important for you to think about it. As Foot &  Ankle specialists, our job is addressing the lower extremity problem and possible causes. Regarding extra body weight, we encourage patients to discuss diet and weight management plans with their primary care doctors. It is this team approach that gives you the best opportunity for pain relief and getting you back on your feet.

## 2018-01-25 ENCOUNTER — TELEPHONE (OUTPATIENT)
Dept: PODIATRY | Facility: CLINIC | Age: 78
End: 2018-01-25

## 2018-01-25 DIAGNOSIS — M21.42 PES PLANUS OF BOTH FEET: ICD-10-CM

## 2018-01-25 DIAGNOSIS — M20.41 HAMMER TOES OF BOTH FEET: ICD-10-CM

## 2018-01-25 DIAGNOSIS — M21.41 PES PLANUS OF BOTH FEET: ICD-10-CM

## 2018-01-25 DIAGNOSIS — M20.42 HAMMER TOES OF BOTH FEET: ICD-10-CM

## 2018-01-25 DIAGNOSIS — M77.8 CAPSULITIS OF RIGHT FOOT: ICD-10-CM

## 2018-01-25 DIAGNOSIS — M79.671 RIGHT FOOT PAIN: Primary | ICD-10-CM

## 2018-01-25 NOTE — TELEPHONE ENCOUNTER
Patient left voicemail stating she saw Dr. Mayer yesterday for a toe problem. She recommended some pads at [facility] and was given a prescription. It states [employer] and there are several [employer]asons products, so she is not sure what she is supposed to get. She is wondering if she needs a new prescription.     Please advise.     DAVID Hutton RN

## 2018-01-26 NOTE — TELEPHONE ENCOUNTER
"Patient calls stating she went to [facility] and is confused about what type of 4 Seasons insert to get. She was offered the \"[employer] 4 Seasons Arch support\" but she states she already has this in her shoes. The staff there wasn't sure what she was asking for, and she is not sure if she is supposed to have something for the metatarsal support that goes across her toes or not.   She states the staff wanted her to tape something.   She states she did not receive a picture or explanation of what she should ask for and didn't want to get the wrong thing.     Please advise.     Routing to Dr. Moreno Hutton, RN    "

## 2018-01-26 NOTE — TELEPHONE ENCOUNTER
It is the 4 seasons inserts with the metatarsal pad. It was not a prescription because they are over the counter inserts.  If she wants custom inserts, we can get her a prescription for that.     Please let patient know. She can also ask the people at [facility] and they are usually very helpful.    Brii Mayer DPM

## 2018-01-26 NOTE — TELEPHONE ENCOUNTER
I was referring to the [employer]aco 4 seasons arch support. If you have them already, she can try:     Powerstep [place] PLUS Orthotic Insoles w/Met. Pad    Can order them online or Try [facility] orthotics in [place].    Please let patient know.  The other option is custom orthotics. Put in order for them:  She can call to set up appointment to get fitted at: [phone]    Brii Mayer DPM

## 2018-01-31 ENCOUNTER — OFFICE VISIT (OUTPATIENT)
Dept: PODIATRY | Facility: CLINIC | Age: 78
End: 2018-01-31
Payer: MEDICARE

## 2018-01-31 VITALS
DIASTOLIC BLOOD PRESSURE: 66 MMHG | BODY MASS INDEX: 23.18 KG/M2 | WEIGHT: 115 LBS | SYSTOLIC BLOOD PRESSURE: 112 MMHG | HEIGHT: 59 IN

## 2018-01-31 DIAGNOSIS — M21.42 PES PLANUS OF BOTH FEET: ICD-10-CM

## 2018-01-31 DIAGNOSIS — M79.671 RIGHT FOOT PAIN: Primary | ICD-10-CM

## 2018-01-31 DIAGNOSIS — M20.41 HAMMER TOE OF RIGHT FOOT: ICD-10-CM

## 2018-01-31 DIAGNOSIS — M77.8 CAPSULITIS OF RIGHT FOOT: ICD-10-CM

## 2018-01-31 DIAGNOSIS — M21.41 PES PLANUS OF BOTH FEET: ICD-10-CM

## 2018-01-31 PROCEDURE — 99213 OFFICE O/P EST LOW 20 MIN: CPT | Performed by: PODIATRIST

## 2018-01-31 NOTE — PROGRESS NOTES
"Podiatry / Foot and Ankle Surgery Progress Note    January 31, 2018    Subject: Patient was seen for follow up on right foot pain. Wants to discuss surgery. Notes.     Objective:  Vitals: /66  Ht 4' 11\" (1.499 m)  Wt 115 lb (52.2 kg)  BMI 23.23 kg/m2  BMI= Body mass index is 23.23 kg/(m^2).    General:  Patient is alert and orientated.  NAD  Dermatologic: Skin is intact to both lower extremities without significant lesions, rash or abrasion.  No paronychia or evidence of soft tissue infection is noted.      Vascular: DP & PT pulses are intact & regular bilaterally.  No significant edema or varicosities noted.  CFT and skin temperature is normal to both lower extremities.      Neurologic: Lower extremity sensation is intact to light touch.  No evidence of weakness or contracture in the lower extremities.  No evidence of neuropathy.      Musculoskeletal: Patient is ambulatory without assistive device or brace.  Decrease arch height. Pain on palpation of plantar distal right 2nd metatarsal head. Semi rigid contracture of toes 2-4 bilateral. Decrease range of motion of right 1st metatarsal phalangeal joint.      Radiographs:  I personally reviewed the xrays. Previous surgery to 1st metatarsal. elongated 2nd and 3rd metatarsals. Degenerative changes noted to 1st metatarsal phalangeal joint. no fractures noted.       ASSESSMENT:    Right foot pain  Hammer toe of right foot  Capsulitis of foot, right  Pes planus of both feet      PLAN:  Reviewed patient's chart in [facility].   Discussed with patient that I normally try other options first before going right to surgery. She has tried any offloading, PT, topical stuff. She also notes that it is not really inhibiting her daily living.     Was fitted for a toe splint. Will try this first. If not better. Possible boot and rediscuss surgery at that time.     Brii Mayer DPM, Podiatry/Foot and Ankle Surgery          "

## 2018-01-31 NOTE — NURSING NOTE
"Chief Complaint   Patient presents with     RECHECK     right foot recheck        Initial /66  Ht 4' 11\" (1.499 m)  Wt 115 lb (52.2 kg)  BMI 23.23 kg/m2 Estimated body mass index is 23.23 kg/(m^2) as calculated from the following:    Height as of this encounter: 4' 11\" (1.499 m).    Weight as of this encounter: 115 lb (52.2 kg).  Medication Reconciliation: complete   Darren Collins MA      "

## 2018-01-31 NOTE — PATIENT INSTRUCTIONS
Thank you for choosing [facility]!    DR. KIMBROUGH'S CLINIC LOCATIONS:   MONDAY AM - [place] T[place] - [place]   [address] [address] [address] [address] [place] [address]   [phone] / [phone] / [phone]       WEDNESDAY - [place]ID[place] PM - [place]   [address]   [address] [address] [place] [id number] [phone] / [phone]       SCHEDULE SURGERY: [phone]    APPOINTMENTS: [phone]    BILLING QUESTIONS: [phone]      HAMMERTOE PADS / TOE SPLINTS    HAMMERTOES  Hammertoe is a contracture (bending) of one or both joints of the second, third, fourth, or fifth (little) toes. This abnormal bending can put pressure on the toe when wearing shoes, causing problems to develop.  Hammertoes usually start out as mild deformities and get progressively worse over time. In the earlier stages, hammertoes are flexible and the symptoms can often be managed with noninvasive measures. But if left untreated, hammertoes can become more rigid and will not respond to non-surgical treatment.  Because of the progressive nature of hammertoes, they should receive early attention. Hammertoes never get better without some kind of intervention.  CAUSES  The most common cause of hammertoe is a muscle/tendon imbalance. This imbalance, which leads to a bending of the toe, results from mechanical (structural) changes in the foot that occur over time in some people.  Hammertoes may be aggravated by shoes that don t fit properly. A hammertoe may result if a toe is too long and is forced into a cramped position when a tight shoe is worn.  Occasionally, hammertoe is the result of an earlier trauma to the toe. In some people, hammertoes are inherited.  SYMPTOMS  Pain or irritation of the affected toe when wearing shoes.   Corns and calluses (a buildup of skin) on the toe, between two  toes, or on the ball of the foot. Corns are caused by constant friction against the shoe. They may be soft or hard, depending upon their location.   Inflammation, redness, or a burning sensation   Contracture of the toe   In more severe cases of hammertoe, open sores may form.   DIAGNOSIS  Although hammertoes are readily apparent, to arrive at a diagnosis the foot and ankle surgeon will obtain a thorough history of your symptoms and examine your foot. During the physical examination, the doctor may attempt to reproduce your symptoms by manipulating your foot and will study the contractures of the toes. In addition, the foot and ankle surgeon may take x-rays to determine the degree of the deformities and assess any changes that may have occurred.   Hammertoes are progressive - they don t go away by themselves and usually they will get worse over time. However, not all cases are alike - some hammertoes progress more rapidly than others. Once your foot and ankle surgeon has evaluated your hammertoes, a treatment plan can be developed that is suited to your needs.  NON-SURGICAL TREATMENT  There is a variety of treatment options for hammertoe. The treatment your foot and ankle surgeon selects will depend upon the severity of your hammertoe and other factors.  A number of non-surgical measures can be undertaken:  Padding corns and calluses. Your foot and ankle surgeon can provide or prescribe pads designed to shield corns from irritation. If you want to try over-the-counter pads, avoid the medicated types. Medicated pads are generally not recommended because they may contain a small amount of acid that can be harmful. Consult your surgeon about this option.   Changes in shoewear. Avoid shoes with pointed toes, shoes that are too short, or shoes with high heels - conditions that can force your toe against the front of the shoe. Instead, choose comfortable shoes with a deep, roomy toe box and heels no higher than two  inches.   Orthotic devices. A custom orthotic device placed in your shoe may help control the muscle/tendon imbalance.   Injection therapy. Corticosteroid injections are sometimes used to ease pain and inflammation caused by hammertoe.   Medications. Oral nonsteroidal anti-inflammatory drugs (NSAIDs), such as ibuprofen, may be recommended to reduce pain and inflammation.   Splinting/strapping. Splints or small straps may be applied by the surgeon to realign the bent toe.   Exercises:   1. The Toe Tap  Stand flat on the ground in your bare feet. Raise all of your toes up off the ground as high as you can. Then starting with the little toes, slowly press them down to the ground. After the big toes are on the ground, start over by raising all of them up off the ground again. This motion is similar to tapping your fingers on a desk. Repeat this ten times.     2. Interlocking your Fingers and Toes  Cross your right foot over your knee and place the fingers of your left hand between your toes. Squeeze your toes together, pinching your fingers between them. Spread the toes apart and squeeze them together again. Repeat this ten times then do the other foot. Like most exercises, this will get easier the more you do it. If you are having a lot of difficulty with this exercise, start with just your index finger between your first and second toe, then later add your middle finger between your second and third toes, and so on until you can fit all your fingers between your toes. Do this ten times on each foot. Eventually you will be able to spread your toes apart without using your fingers.    3. Gripping the Floor   the floor by pressing the pads of your toes (not the tips) into the floor without curling your toes. Relax and repeat this ten times. If your shoes have the proper amount of depth, you should be able to do this with shoes on.    HAMMERTOE TOE SURGERY   Hammertoe surgery is complex. The surgical procedure is an  attempt to help the toe lay in a better position. Nearly every structure in the toe will be cut including the tendons, ligaments, skin and bone. Hammertoes are a complex deformity and final toe position is difficult to predict. The only sure way to position a toe is to fuse all three digital joints. That will not happen as some degree of toe motion is needed for walking. The toe may not be completely reduced as the surrounding skin and other structures may not allow the toe to return to a normal position. The tendons on adjacent toes may need to be cut at the time of the original or subsequent surgeries, as interconnections exist between the toes. The toe may drift after surgery. Stiffness may develop leading to new areas of pressure.   Future shoe choices will be critical in allowing the surgery to provide comfort. The toes will still hurt if shoes rub. The original pain may also persist as other foot problems may be contributing to the current pain. The toe may or may not be pinned in place. External pins would require complete avoidance of water on the foot for six weeks. The pin would be removed about six weeks after the surgery. Strict attention to protection is critical. The pin could get bumped or loosen resulting in early removal. Removal might be necessary before the bone heals which would negatively affect the final surgical outcome and toe allignment.         Body Mass Index (BMI)  Many things can cause foot and ankle problems. Foot structure, activity level, foot mechanics and injuries are common causes of pain.  One very important issue that often goes unmentioned, is body weight. Extra weight can cause increased stress on muscles, ligaments, bones and tendons.  Sometimes just a few extra pounds is all it takes to put one over her/his threshold. Without reducing that stress, it can be difficult to alleviate pain. Some people are uncomfortable addressing this issue, but we feel it is important for you to  think about it. As Foot &  Ankle specialists, our job is addressing the lower extremity problem and possible causes. Regarding extra body weight, we encourage patients to discuss diet and weight management plans with their primary care doctors. It is this team approach that gives you the best opportunity for pain relief and getting you back on your feet.

## 2018-11-01 ENCOUNTER — HOSPITAL ENCOUNTER (OUTPATIENT)
Dept: MAMMOGRAPHY | Facility: CLINIC | Age: 78
Discharge: HOME | End: 2018-11-01
Attending: OBSTETRICS & GYNECOLOGY | Admitting: OBSTETRICS & GYNECOLOGY
Payer: MEDICARE

## 2018-11-01 DIAGNOSIS — Z12.31 VISIT FOR SCREENING MAMMOGRAM: ICD-10-CM

## 2018-11-01 PROCEDURE — 77067 SCR MAMMO BI INCL CAD: CPT

## 2019-08-05 ENCOUNTER — APPOINTMENT (OUTPATIENT)
Dept: CT IMAGING | Facility: CLINIC | Age: 79
End: 2019-08-05
Attending: NURSE PRACTITIONER
Payer: MEDICARE

## 2019-08-05 ENCOUNTER — HOSPITAL ENCOUNTER (EMERGENCY)
Facility: CLINIC | Age: 79
Discharge: HOME | End: 2019-08-05
Attending: NURSE PRACTITIONER | Admitting: NURSE PRACTITIONER
Payer: MEDICARE

## 2019-08-05 ENCOUNTER — APPOINTMENT (OUTPATIENT)
Dept: GENERAL RADIOLOGY | Facility: CLINIC | Age: 79
End: 2019-08-05
Attending: NURSE PRACTITIONER
Payer: MEDICARE

## 2019-08-05 VITALS
HEIGHT: 59 IN | BODY MASS INDEX: 22.18 KG/M2 | WEIGHT: 110 LBS | DIASTOLIC BLOOD PRESSURE: 60 MMHG | TEMPERATURE: 98.1 F | OXYGEN SATURATION: 98 % | SYSTOLIC BLOOD PRESSURE: 148 MMHG | RESPIRATION RATE: 18 BRPM

## 2019-08-05 DIAGNOSIS — S10.93XA CONTUSION OF FACE, SCALP AND NECK: ICD-10-CM

## 2019-08-05 DIAGNOSIS — M25.511 SHOULDER PAIN, RIGHT: ICD-10-CM

## 2019-08-05 DIAGNOSIS — S00.03XA CONTUSION OF FACE, SCALP AND NECK: ICD-10-CM

## 2019-08-05 DIAGNOSIS — S09.90XA CLOSED HEAD INJURY, INITIAL ENCOUNTER: Primary | ICD-10-CM

## 2019-08-05 DIAGNOSIS — S00.83XA CONTUSION OF FACE, SCALP AND NECK: ICD-10-CM

## 2019-08-05 DIAGNOSIS — S01.81XA CHIN LACERATION, INITIAL ENCOUNTER: ICD-10-CM

## 2019-08-05 PROCEDURE — 73030 X-RAY EXAM OF SHOULDER: CPT | Mod: RT

## 2019-08-05 PROCEDURE — 70450 CT HEAD/BRAIN W/O DYE: CPT

## 2019-08-05 PROCEDURE — 99285 EMERGENCY DEPT VISIT HI MDM: CPT | Mod: 25

## 2019-08-05 PROCEDURE — 70486 CT MAXILLOFACIAL W/O DYE: CPT

## 2019-08-05 PROCEDURE — 12011 RPR F/E/E/N/L/M 2.5 CM/<: CPT

## 2019-08-05 PROCEDURE — 73130 X-RAY EXAM OF HAND: CPT | Mod: RT

## 2019-08-05 ASSESSMENT — ENCOUNTER SYMPTOMS
LIGHT-HEADEDNESS: 1
NECK PAIN: 0
WOUND: 1

## 2019-08-05 ASSESSMENT — MIFFLIN-ST. JEOR: SCORE: 884.59

## 2019-08-05 NOTE — ED PROVIDER NOTES
"  History     Chief Complaint:  Fall and Laceration      HPI   Maria E Webster is a 78 year old female who presents with a chin laceration after tripping and falling forward in her garage around 1430. She also hit her right cheek, right side of her head and right shoulder. She did not lose consciousness. Patient reports lightheadedness initially which is resolved now. She didn't take any pain medication. Patient reports she was carrying some items and the fall was mechanical in nature as she thinks her shoe caught. Patient is not anticoagulated. She has associated right shoulder and right hand pain. She denies chest pain, neck pain, back pain, vision changes and hearing difficulty. Last tetanus was in 2014.     Allergies:  Codeine  Darvocet  Morphine     Medications:    Aspirin   Colace  Estrace vaginal cream   Metoprolol   Cellcept   Omeprazole  Pyridium   Senokot  Zocor     Past Medical History:    Arthritis   Diverticulosis of colon  Hernia  HTN  Polycystic kidney   Rectocele     Past Surgical History:    Left kidney transplant   Hysterectomy   Cystoscopy   Correct bunion  Hemorrhoidectomy   Hernia repair  Tympanoplasty     Family History:    Genitourinary problems   Depression   HTN    Social History:  Presents to the ED with her .   Tobacco Use: Never  Alcohol Use: Once weekly  PCP: Hernandez Mena  Marital Status:   [2]    Review of Systems   Eyes: Negative for visual disturbance.   Cardiovascular: Negative for chest pain.   Musculoskeletal: Negative for neck pain.        Positive for right shoulder pain.    Skin: Positive for wound.   Neurological: Positive for light-headedness. Negative for syncope.   All other systems reviewed and are negative.    Physical Exam   First Vitals:  BP: (!) 150/52  Heart Rate: 72  Temp: 98.1  F (36.7  C)  Resp: 18  Height: 149.9 cm (4' 11\")  Weight: 49.9 kg (110 lb)  SpO2: 93 %    Physical Exam   General: Alert. Well kept.  HEENT:   Head: No facial " asymmetry. No palpable scalp hematomas or bony step offs. Right maxillary facial tenderness with bruising over right cheek and tenderness over right inferior orbital rim.   Eyes: Normal conjunctiva. No scleral icterus. PERRLA. EOMI. No diplopia. No raccoon s eyes.   Ears: Normal pinnae. Normal external auditory canals. Normal tympanic membranes. No hemotympanum bilaterally. No Murrell's signs.   Nose: No deformity. No nasal drainage.   Throat: Moist mucous membranes. No evidence for intraoral trauma. No bite malocclusion  Neck: Supple, no nuchal rigidity. No midline tenderness over cervical spine or paraspinal musculature. Normal range of motion.   Cardiac: Normal rate and regular rhythm.  No murmurs, rubs, or gallops appreciated. 2+ radial pulses bilateral.   Pulmonary: CTA bilaterally. Normal breath sounds. No wheezing, crackles, or rhonchi appreciated.   Abdomen: Soft, non-tender, non-distended. No rebound or guarding.   Neuro: GCS 15. Alert and oriented. Cranial nerves II-XII intact. 5/5 strength equal bilateral upper and lower extremities. Gait smooth. Finger-nose-finger coordinated and equal bilateral. Heel shin smooth and equal bilateral. Visual fields bilateral without deficit.  MUSCULOSKELETAL: Normal gross range of motion of all 4 extremities.  No midline tenderness over thoracic, lumbar or sacral spine.   Lower extremities: 5/5 symmetric strength and ROM with dorsi- and plantar-flexion, knee flexion and extension, hip flexion, hip internal and external rotation, compartments soft.   Upper extremities: right shoulder anterior tenderness. Pain dorsum right hand over the 2-4 MCP joints with full strength flexion and extension of all fingers, wrist.  No wrist pain.  No snuff box tenderness. No pain with ROM of elbow. Able to abduct and adduct at the shoulder with pain over anterior shoulder and with normal internal and external rotation.  Chronic arthritis changes to bilateral hands.   SKIN: Skin is warm and  dry. No rashes, petechiae purpura.   Bruising over right lateral eyebrow with 2mm superficial abrasion, right cheek, inferior chin. 1cm laceration inferior chin.   PSYCH: Normal affect and mood. Good eye contact.    Emergency Department Course     Imaging:  Radiographic findings were communicated with the patient who voiced understanding of the findings.    CT Facial Bones without Contrast   Preliminary Result   IMPRESSION: There are no facial bone fractures. There is polypoid   mucosal thickening in the maxillary sinuses bilaterally and in the   right frontal sinus. Additionally, there is an air-fluid level in the   right frontal sinus raising the question of acute sinusitis.   Temporomandibular joint alignment bilaterally is normal.         Radiation dose for this scan was reduced using automated exposure   control, adjustment of the mA and/or kV according to patient size, or   iterative reconstruction technique      Head CT w/o contrast   Preliminary Result   IMPRESSION: Possible acute right frontal sinusitis. Diffuse cerebral   volume loss and cerebral white matter changes consistent with chronic   small vessel ischemic disease. No evidence for acute intracranial   pathology.         Radiation dose for this scan was reduced using automated exposure   control, adjustment of the mA and/or kV according to patient size, or   iterative reconstruction technique      XR Hand Right G/E 3 Views   Preliminary Result   IMPRESSION:   1. Subluxations. No dislocation or acute fracture.   2. Extensive osteoarthrosis and erosive osteoarthrosis.   3. Old healed distal radius fracture, old nonunited ulnar styloid   process fracture, probable widening of the scapholunate interval and   evidence of dorsal intercalated segment instability pattern.       XR Shoulder Right G/E 3 Views      FINDINGS: There is calcification in the region of the  supraspinatus/infraspinatus tendons and/or the overlying  subacromial/subdeltoid bursa.  "Please correlate for possible bursitis.  There is narrowing of the subacromial space by a laterally downsloping  acromion process and subacromial enthesophyte. Otherwise negative. No  evidence of fracture.     Procedures:    Laceration Repair        LACERATION:  A simple and superficial clean 1.5 cm laceration.      LOCATION:  [place]in      FUNCTION:  Distally sensation and circulation are intact.      ANESTHESIA:  LET - Topical      PREPARATION:  Irrigation with Normal Saline      DEBRIDEMENT:  no debridement      CLOSURE:  Wound was closed with One Layer.  Skin closed with 2 x 6.0 Ethylon using interrupted sutures.      Emergency Department Course:  The patient arrived in triage where vitals were measured and recorded.   The patient was then escorted back to the emergency department.   The patient's medical records were reviewed.  Nursing notes and vitals were reviewed.  1751: I performed an exam of the patient as documented above.  The above workup was undertaken.  1906: I performed the laceration repair; see procedure note above.   1950: Shared service with Dr. Merrill.   2009: I rechecked the patient and discussed results.   Findings and plan explained to the Patient. Patient discharged home, status improved, with instructions regarding supportive care, medications, and reasons to return as well as the importance of close follow-up was reviewed.      Impression & Plan      Medical Decision Making:  Maria E Webster is a 78 year old female who presents with a fall with facial trauma.  The differential diagnosis includes skull fracture, epidural hematoma, subdural hematoma, intracerebral hemorrhage, traumatic subarachnoid hemorrhage, and midface fracture.  A CT of the head was indicated using [place] Head CT guidelines which returned negative.  Despite the normal neuroimaging,  the patient/family understands that they must return if any \"red flags\" appear/develop in the coming hours/days, as this may represent an " "indication to perform another CT scan.  I have noted that \"red flags\" include: lethargy or irritability,  strange behavior, seizures, repeated vomiting, weakness or loss of responsiveness. Although rare, delayed CNS bleeds can occur, and the patient's parents were notified of this.   Closed head injury instructions were given.  Facial bones CT also negative for acute fracture.  Her neck was cleared by NEXUS criteria.  Xray of the shoulder negative for fracture.  I discussed treatment with NINA.  Hand xray shows no acute fracture but does show possible widening of the scapholunate interval.  She has no pain over the proximal dorsum of the hand on reexamination and no pain with ROM of her wrist.  We discussed conservative treatment with splinting but with no pain and a significant history of arthritis and hand deformity, this is likely a chronic finding and in shared decision making we elected to not apply a splint and have her follow-up with orthopedics this week.     Patient also sustained a chin laceration. The wound was carefully evaluated and explored. The laceration was closed with sutures as noted above. No signs of foreign body. Possible complications (infection, scarring) were reviewed with the patient. Follow up with primary care will be indicated for suture removal. Tetanus is up to date.     Diagnosis:     ICD-10-CM    1. Closed head injury, initial encounter S09.90XA    2. Contusion of face, scalp and neck S00.83XA     S00.03XA     S10.93XA    3. Chin laceration, initial encounter S01.81XA    4. Shoulder pain, right M25.511    5. Scapholunate dissociation of right wrist M25.331        Disposition:  Discharged to home.     Hina DOUGLAS, am serving as a scribe on 8/5/2019 at 5:51 PM to personally document services performed by Nathalie Lebron CNP based on my observations and the provider's statements to me.    EMERGENCY DEPARTMENT       Nathalie Lebron CNP  08/06/19 0046    "

## 2019-08-05 NOTE — ED AVS SNAPSHOT
Emergency Department  [address] [facility] [address]  Phone:  [phone]  Fax:  [phone]                                    Maria E Webster   MRN: [id number]    Department:   Emergency Department   Date of Visit:  8/5/2019           After Visit Summary Signature Page    I have received my discharge instructions, and my questions have been answered. I have discussed any challenges I see with this plan with the nurse or doctor.    ..........................................................................................................................................  Patient/Patient Representative Signature      ..........................................................................................................................................  Patient Representative Print Name and Relationship to Patient    ..................................................               ................................................  Date                                   Time    ..........................................................................................................................................  Reviewed by Signature/Title    ...................................................              ..............................................  Date                                               Time          22EPIC Rev 08/18

## 2019-08-05 NOTE — ED TRIAGE NOTES
78-year-old female presents to the ER with complaints of a chin laceration after tripping and falling in her garage. Bleeding controlled to laceration at this time. Pt also states she hit her right cheek and right side of her head. Denies LOC. Denies blood thinners.

## 2019-08-06 NOTE — ED PROVIDER NOTES
Emergency Department Attending Supervision Note  8/5/2019  7:51 PM      I evaluated this patient in conjunction with Nathalie Lebron CNP      Briefly, the patient presented with injuries from a mechanical fall.  She had a chin laceration.  She initially had some hand discomfort though this is resolved.  She reports a long-standing diagnosis of arthritis.  Based primarily on abnormal x-rays of the right hand, I was asked to be involved for further input.  At the time of my direct interview of the patient in fast track room 2, with her  present, she states that her right hand and wrist feel completely fine at this time, and she has absolutely no new symptoms there.  She feels that the range of motion of her right wrist hand and fingers is at its baseline.      On my exam, she has chronic appearing deformities to both hands though she does not have any point tenderness to her right wrist, hand, or fingers.  She has good flexion and extension of the right wrist, MCPs, and all fingers.  She is awake and talking with clear speech.  She is ambulatory.    RIGHT HAND THREE OR MORE VIEWS    8/5/2019 6:17 PM   COMPARISON: None.     FINDINGS: Old healed distal radius fracture. Old nonunited ulnar  styloid process fracture. Severe osteoarthrosis at the STT joint.  Moderate to severe osteoarthrosis at the first CMC joint. There is  extensive osteoarthrosis in the MCP joints and interphalangeal joints  of the fingers and thumb. There is some central erosive change in the  DIP joints of the fingers indicating erosive osteoarthrosis. Mild  subluxation at the first, second, third and fourth MCP joints as well  as the interphalangeal joint of the thumb. Dorsal tilt of the lunate.  Probable scapholunate interval widening.                                                                      IMPRESSION:  1. Subluxations. No dislocation or acute fracture.  2. Extensive osteoarthrosis and erosive osteoarthrosis.  3. Old healed  distal radius fracture, old nonunited ulnar styloid  process fracture, probable widening of the scapholunate interval and  evidence of dorsal intercalated segment instability pattern.   Results:    ED course:  1952 LISA Lebron, shared service of the patient with me. I interviewed and examined the patient.    My impression is that this was clearly a mechanical fall and there is no indication for medical work-up.  From a trauma standpoint, imaging is benign, with note made of chronic appearing abnormalities of the right hand.  The possibility of acute scapholunate dissociation was considered.  However, the patient has absolutely no persistent symptoms in her right hand or wrist.  She has no point tenderness.  We discussed potential risks and benefits of splinting, though she wished to defer this given again that she is asymptomatic now in her right upper extremity.  She is given referral information for orthopedics and advised to follow-up there soon .  Given her asymptomatic state and benign exam, I do not think she requires emergent orthopedic consultation, though the imaging abnormalities were readily acknowledged to the patient.  She was discharged home in improved condition.      Diagnosis    ICD-10-CM    1. Closed head injury, initial encounter S09.90XA    2. Contusion of face, scalp and neck S00.83XA     S00.03XA     S10.93XA    3. Chin laceration, initial encounter S01.81XA    4. Shoulder pain, right M25.511    5. Scapholunate dissociation of right wrist, possible, based on imaging though no persistent symptoms M25.331      STELLA, Yovanny Peters, am serving as a scribe on 8/5/2019 at 10:31 PM to personally document services performed by Kirill Merrill MD based on my observations and the provider's statements to me.     This record was created at least in part using electronic voice recognition software, so please excuse any typographical errors.    Kirill Merrill MD Reitsema, Jeffrey Alan,  MD  08/06/19 0204

## 2019-08-06 NOTE — DISCHARGE INSTRUCTIONS
Discharge Instructions  Head Injury    You have been seen today for a head injury. Your evaluation included a history and physical examination. You may have had a CT (CAT) scan performed, though most head injuries do not require a scan. Based on this evaluation, your provider today does not feel that your head injury is serious.    Generally, every Emergency Department visit should have a follow-up clinic visit with either a primary or a specialty clinic/provider. Please follow-up as instructed by your emergency provider today.  Return to the Emergency Department if:  You are confused or you are not acting right.  Your headache gets worse or you start to have a really bad headache even with your recommended treatment plan.  You vomit (throw up) more than once.  You have a seizure.  You have trouble walking.  You have weakness or paralysis (cannot move) in an arm or a leg.  You have blood or fluid coming from your ears or nose.  You have new symptoms or anything that worries you.    Sleeping:  It is okay for you to sleep, but someone should wake you up if instructed by your provider, and someone should check on you at your usual time to wake up.     Activity:  Do not drive for at least 24 hours.  Do not drive if you have dizzy spells or trouble concentrating, or remembering things.  Do not return to any contact sports until cleared by your regular provider.     MORE INFORMATION:    Concussion:  A concussion is a minor head injury that may cause temporary problems with the way the brain works. Although concussions are important, they are generally not an emergency or a reason that a person needs to be hospitalized. Some concussion symptoms include confusion, amnesia (forgetful), nausea (sick to your stomach) and vomiting (throwing up), dizziness, fatigue, memory or concentration problems, irritability and sleep problems. For most people, concussions are mild and temporary but some will have more severe and persistent  symptoms that require on-going care and treatment.  CT Scans: Your evaluation today may have included a CT scan (CAT scan) to look for things like bleeding or a skull fracture (broken bone).  CT scans involve radiation and too many CT scans can cause serious health problems like cancer, especially in children.  Because of this, your provider may not have ordered a CT scan today if they think you are at low risk for a serious or life threatening problem.    If you were given a prescription for medicine here today, be sure to read all of the information (including the package insert) that comes with your prescription.  This will include important information about the medicine, its side effects, and any warnings that you need to know about.  The pharmacist who fills the prescription can provide more information and answer questions you may have about the medicine.  If you have questions or concerns that the pharmacist cannot address, please call or return to the Emergency Department.     Remember that you can always come back to the Emergency Department if you are not able to see your regular provider in the amount of time listed above, if you get any new symptoms, or if there is anything that worries you.  Discharge Instructions  Laceration (Cut)    You were seen today for a laceration (cut).  Your provider examined your laceration for any problems such a buried foreign body (like glass, a splinter, or gravel), or injury to blood vessels, tendons, and nerves.  Your provider may have also rinsed and/or scrubbed your laceration to help prevent an infection. It may not be possible to find all problems with your laceration on the first visit; occasionally foreign bodies or a tendon injury can go undetected.    Your laceration may have been closed in one of several ways:  No closure: many wounds will heal just fine without closure.  Stitches: regular stitches that require removal.  Staples: skin staples are often used in  the scalp/head.  Wound adhesive (glue): skin glue can be used for certain lacerations and doesn t require removal.  Wound strips (aka Butterfly bandages or steri-strips): these are bandages that help to close a wound.  Absorbable stitches:  dissolving  stitches that go away on their own and usually don t require removal.    A small percentage of wounds will develop an infection regardless of how well the wound is cared for. Antibiotics are generally not indicated to prevent an infection so are only given for a small number of high-risk wounds. Some lacerations are too high risk to close, and are left open to heal because closure can increase the likelihood that an infection will develop.    Remember that all lacerations, no matter how expertly repaired, will cause scarring. We consider many factors, techniques, and materials, in our efforts to provide the best possible cosmetic outcome.    Generally, every Emergency Department visit should have a follow-up clinic visit with either a primary or a specialty clinic/provider. Please follow-up as instructed by your emergency provider today.     Return to the Emergency Department right away if:  You have more redness, swelling, pain, drainage (pus), a bad smell, or red streaking from your laceration as these symptoms could indicate an infection.  You have a fever of 100.4 F or more.  You have bleeding that you cannot stop at home. If your cut starts to bleed, hold pressure on the bleeding area with a clean cloth or put pressure over the bandage.  If the bleeding does not stop after using constant pressure for 30 minutes, you should return to the Emergency Department for further treatment.  An area past the laceration is cool, pale, or blue compared with the other side, or has a slower return of color when squeezed.  Your dressing seems too tight or starts to get uncomfortable or painful. For children, signs of a problem might be irritability or restlessness.  You have  loss of normal function or use of an area, such as being unable to straighten or bend a finger normally.  You have a numb area past the laceration.    Return to the Emergency Department or see your regular provider if:  The laceration starts to come open.   You have something coming out of the cut or a feeling that there is something in the laceration.  Your wound will not heal, or keeps breaking open. There can always be glass, wood, dirt or other things in any wound.  They will not always show up, even on x-rays.  If a wound does not heal, this may be why, and it is important to follow-up with your regular provider.    Home Care:  Take your dressing off in 12-24 hours, or as instructed by your provider, to check your laceration. Remove the dressing sooner if it seems too tight or painful, or if it is getting numb, tingly, or pale past the dressing.  Gently wash your laceration 1-2 times daily with clean water and mild soap. It is okay to shower or run clean water over the laceration, but do not let the laceration soak in water (no swimming).  If your laceration was closed with wound adhesive or strips: pat it dry and leave it open to the air. For all other repairs: after you wash your laceration, or at least 2 times a day, apply antibiotic ointment (such as Neosporin  or Bacitracin ) to the laceration, then cover it with a Band-Aid  or gauze.  Keep the laceration clean. Wear gloves or other protective clothing if you are around dirt.    Follow-up for removal:  If your wound was closed with staples or regular stitches, they need to be removed according to the instructions and timeline specified by your provider today.  If your wound was closed with absorbable ( dissolving ) sutures, they should fall out, dissolve, or not be visible in about one week. If they are still visible, then they should be removed according to the instructions and timeline specified by your provider today.    Scars:  To help minimize  scarring:  Wear sunscreen over the healed laceration when out in the sun.  Massage the area regularly once healed.  You may apply Vitamin E to the healed wound.  Wait. Scars improve in appearance over months and years.    If you were given a prescription for medicine here today, be sure to read all of the information (including the package insert) that comes with your prescription.  This will include important information about the medicine, its side effects, and any warnings that you need to know about.  The pharmacist who fills the prescription can provide more information and answer questions you may have about the medicine.  If you have questions or concerns that the pharmacist cannot address, please call or return to the Emergency Department.       Remember that you can always come back to the Emergency Department if you are not able to see your regular provider in the amount of time listed above, if you get any new symptoms, or if there is anything that worries you.

## 2019-08-07 ENCOUNTER — TELEPHONE (OUTPATIENT)
Dept: NURSING | Facility: CLINIC | Age: 79
End: 2019-08-07

## 2019-08-07 NOTE — TELEPHONE ENCOUNTER
Patient transferred call from the [facility]. She has questions about her After visit summary. Reviewed information and answered questions as able. No further issues. Advised to call back if more questions or problems.  Alexa Contreras RN  [facility]

## 2019-11-04 ENCOUNTER — HOSPITAL ENCOUNTER (OUTPATIENT)
Dept: MAMMOGRAPHY | Facility: CLINIC | Age: 79
Discharge: HOME | End: 2019-11-04
Attending: OBSTETRICS & GYNECOLOGY | Admitting: OBSTETRICS & GYNECOLOGY
Payer: MEDICARE

## 2019-11-04 DIAGNOSIS — Z12.31 VISIT FOR SCREENING MAMMOGRAM: ICD-10-CM

## 2019-11-04 PROCEDURE — 77063 BREAST TOMOSYNTHESIS BI: CPT

## 2020-12-17 ENCOUNTER — HOSPITAL ENCOUNTER (OUTPATIENT)
Dept: MAMMOGRAPHY | Facility: CLINIC | Age: 80
Discharge: HOME | End: 2020-12-17
Attending: OBSTETRICS & GYNECOLOGY | Admitting: OBSTETRICS & GYNECOLOGY
Payer: MEDICARE

## 2020-12-17 DIAGNOSIS — Z12.31 OTHER SCREENING MAMMOGRAM: ICD-10-CM

## 2020-12-17 PROCEDURE — 77067 SCR MAMMO BI INCL CAD: CPT

## 2022-01-07 ENCOUNTER — HOSPITAL ENCOUNTER (OUTPATIENT)
Dept: MAMMOGRAPHY | Facility: CLINIC | Age: 82
Discharge: HOME | DRG: 392 | End: 2022-01-07
Attending: OBSTETRICS & GYNECOLOGY | Admitting: OBSTETRICS & GYNECOLOGY
Payer: MEDICARE

## 2022-01-07 DIAGNOSIS — Z12.31 VISIT FOR SCREENING MAMMOGRAM: ICD-10-CM

## 2022-01-07 PROCEDURE — 77067 SCR MAMMO BI INCL CAD: CPT

## 2022-01-09 ENCOUNTER — APPOINTMENT (OUTPATIENT)
Dept: CT IMAGING | Facility: CLINIC | Age: 82
DRG: 392 | End: 2022-01-09
Attending: EMERGENCY MEDICINE
Payer: MEDICARE

## 2022-01-09 ENCOUNTER — HOSPITAL ENCOUNTER (INPATIENT)
Facility: CLINIC | Age: 82
LOS: 4 days | Discharge: HOME | DRG: 392 | End: 2022-01-14
Attending: EMERGENCY MEDICINE | Admitting: INTERNAL MEDICINE
Payer: MEDICARE

## 2022-01-09 DIAGNOSIS — R10.84 ABDOMINAL PAIN, GENERALIZED: ICD-10-CM

## 2022-01-09 DIAGNOSIS — K57.00 DIVERTICULITIS OF SMALL INTESTINE WITH PERFORATION WITHOUT BLEEDING: Primary | ICD-10-CM

## 2022-01-09 LAB
ALBUMIN SERPL-MCNC: 3.6 G/DL (ref 3.4–5)
ALP SERPL-CCNC: 51 U/L (ref 40–150)
ALT SERPL W P-5'-P-CCNC: 26 U/L (ref 0–50)
ANION GAP SERPL CALCULATED.3IONS-SCNC: 6 MMOL/L (ref 3–14)
AST SERPL W P-5'-P-CCNC: 15 U/L (ref 0–45)
BASOPHILS # BLD AUTO: 0 10E3/UL (ref 0–0.2)
BASOPHILS NFR BLD AUTO: 0 %
BILIRUB SERPL-MCNC: 0.4 MG/DL (ref 0.2–1.3)
BUN SERPL-MCNC: 35 MG/DL (ref 7–30)
CALCIUM SERPL-MCNC: 8.6 MG/DL (ref 8.5–10.1)
CHLORIDE BLD-SCNC: 99 MMOL/L (ref 94–109)
CO2 SERPL-SCNC: 25 MMOL/L (ref 20–32)
CREAT SERPL-MCNC: 0.62 MG/DL (ref 0.52–1.04)
EOSINOPHIL # BLD AUTO: 0 10E3/UL (ref 0–0.7)
EOSINOPHIL NFR BLD AUTO: 0 %
ERYTHROCYTE [DISTWIDTH] IN BLOOD BY AUTOMATED COUNT: 13.8 % (ref 10–15)
GFR SERPL CREATININE-BSD FRML MDRD: 89 ML/MIN/1.73M2
GLUCOSE BLD-MCNC: 142 MG/DL (ref 70–99)
HCT VFR BLD AUTO: 37.9 % (ref 35–47)
HGB BLD-MCNC: 12.2 G/DL (ref 11.7–15.7)
HOLD SPECIMEN: NORMAL
IMM GRANULOCYTES # BLD: 0 10E3/UL
IMM GRANULOCYTES NFR BLD: 0 %
LACTATE SERPL-SCNC: 1.5 MMOL/L (ref 0.7–2)
LIPASE SERPL-CCNC: 243 U/L (ref 73–393)
LYMPHOCYTES # BLD AUTO: 0.4 10E3/UL (ref 0.8–5.3)
LYMPHOCYTES NFR BLD AUTO: 6 %
MCH RBC QN AUTO: 29.9 PG (ref 26.5–33)
MCHC RBC AUTO-ENTMCNC: 32.2 G/DL (ref 31.5–36.5)
MCV RBC AUTO: 93 FL (ref 78–100)
MONOCYTES # BLD AUTO: 0.3 10E3/UL (ref 0–1.3)
MONOCYTES NFR BLD AUTO: 5 %
NEUTROPHILS # BLD AUTO: 6 10E3/UL (ref 1.6–8.3)
NEUTROPHILS NFR BLD AUTO: 89 %
NRBC # BLD AUTO: 0 10E3/UL
NRBC BLD AUTO-RTO: 0 /100
PLATELET # BLD AUTO: 276 10E3/UL (ref 150–450)
POTASSIUM BLD-SCNC: 3.7 MMOL/L (ref 3.4–5.3)
PROT SERPL-MCNC: 7.1 G/DL (ref 6.8–8.8)
RBC # BLD AUTO: 4.08 10E6/UL (ref 3.8–5.2)
SODIUM SERPL-SCNC: 130 MMOL/L (ref 133–144)
WBC # BLD AUTO: 6.7 10E3/UL (ref 4–11)

## 2022-01-09 PROCEDURE — 96365 THER/PROPH/DIAG IV INF INIT: CPT | Mod: 59

## 2022-01-09 PROCEDURE — 96361 HYDRATE IV INFUSION ADD-ON: CPT

## 2022-01-09 PROCEDURE — 99285 EMERGENCY DEPT VISIT HI MDM: CPT | Mod: 25

## 2022-01-09 PROCEDURE — 85025 COMPLETE CBC W/AUTO DIFF WBC: CPT | Performed by: EMERGENCY MEDICINE

## 2022-01-09 PROCEDURE — 258N000003 HC RX IP 258 OP 636: Performed by: EMERGENCY MEDICINE

## 2022-01-09 PROCEDURE — 250N000011 HC RX IP 250 OP 636: Performed by: EMERGENCY MEDICINE

## 2022-01-09 PROCEDURE — 36415 COLL VENOUS BLD VENIPUNCTURE: CPT | Performed by: EMERGENCY MEDICINE

## 2022-01-09 PROCEDURE — 83605 ASSAY OF LACTIC ACID: CPT | Performed by: EMERGENCY MEDICINE

## 2022-01-09 PROCEDURE — 87635 SARS-COV-2 COVID-19 AMP PRB: CPT | Performed by: EMERGENCY MEDICINE

## 2022-01-09 PROCEDURE — 80053 COMPREHEN METABOLIC PANEL: CPT | Performed by: EMERGENCY MEDICINE

## 2022-01-09 PROCEDURE — 83690 ASSAY OF LIPASE: CPT | Performed by: EMERGENCY MEDICINE

## 2022-01-09 PROCEDURE — 99223 1ST HOSP IP/OBS HIGH 75: CPT | Mod: AI | Performed by: INTERNAL MEDICINE

## 2022-01-09 PROCEDURE — C9803 HOPD COVID-19 SPEC COLLECT: HCPCS

## 2022-01-09 PROCEDURE — G1004 CDSM NDSC: HCPCS

## 2022-01-09 RX ORDER — IOPAMIDOL 755 MG/ML
80 INJECTION, SOLUTION INTRAVASCULAR ONCE
Status: COMPLETED | OUTPATIENT
Start: 2022-01-10 | End: 2022-01-10

## 2022-01-09 RX ORDER — ONDANSETRON 2 MG/ML
4 INJECTION INTRAMUSCULAR; INTRAVENOUS ONCE
Status: COMPLETED | OUTPATIENT
Start: 2022-01-09 | End: 2022-01-09

## 2022-01-09 RX ORDER — PREDNISONE 5 MG/1
5 TABLET ORAL DAILY
COMMUNITY

## 2022-01-09 RX ADMIN — ONDANSETRON 4 MG: 2 INJECTION INTRAMUSCULAR; INTRAVENOUS at 23:31

## 2022-01-09 RX ADMIN — SODIUM CHLORIDE 1000 ML: 9 INJECTION, SOLUTION INTRAVENOUS at 23:34

## 2022-01-09 ASSESSMENT — ENCOUNTER SYMPTOMS
DIARRHEA: 0
NAUSEA: 1
VOMITING: 0
ABDOMINAL PAIN: 1

## 2022-01-09 ASSESSMENT — MIFFLIN-ST. JEOR: SCORE: 878.66

## 2022-01-10 PROBLEM — R10.84 ABDOMINAL PAIN, GENERALIZED: Status: ACTIVE | Noted: 2022-01-10

## 2022-01-10 PROBLEM — K55.9 ISCHEMIA, BOWEL (H): Status: ACTIVE | Noted: 2022-01-10

## 2022-01-10 PROBLEM — K57.00: Status: ACTIVE | Noted: 2022-01-10

## 2022-01-10 LAB — SARS-COV-2 RNA RESP QL NAA+PROBE: NEGATIVE

## 2022-01-10 PROCEDURE — 250N000012 HC RX MED GY IP 250 OP 636 PS 637: Performed by: STUDENT IN AN ORGANIZED HEALTH CARE EDUCATION/TRAINING PROGRAM

## 2022-01-10 PROCEDURE — 258N000003 HC RX IP 258 OP 636: Performed by: INTERNAL MEDICINE

## 2022-01-10 PROCEDURE — 99232 SBSQ HOSP IP/OBS MODERATE 35: CPT | Performed by: STUDENT IN AN ORGANIZED HEALTH CARE EDUCATION/TRAINING PROGRAM

## 2022-01-10 PROCEDURE — 120N000001 HC R&B MED SURG/OB

## 2022-01-10 PROCEDURE — 250N000013 HC RX MED GY IP 250 OP 250 PS 637: Performed by: INTERNAL MEDICINE

## 2022-01-10 PROCEDURE — 250N000011 HC RX IP 250 OP 636: Performed by: INTERNAL MEDICINE

## 2022-01-10 PROCEDURE — 250N000009 HC RX 250: Performed by: EMERGENCY MEDICINE

## 2022-01-10 PROCEDURE — 99222 1ST HOSP IP/OBS MODERATE 55: CPT | Performed by: SURGERY

## 2022-01-10 PROCEDURE — 250N000013 HC RX MED GY IP 250 OP 250 PS 637: Performed by: STUDENT IN AN ORGANIZED HEALTH CARE EDUCATION/TRAINING PROGRAM

## 2022-01-10 PROCEDURE — 250N000011 HC RX IP 250 OP 636: Performed by: EMERGENCY MEDICINE

## 2022-01-10 RX ORDER — LANOLIN ALCOHOL/MO/W.PET/CERES
3 CREAM (GRAM) TOPICAL
Status: DISCONTINUED | OUTPATIENT
Start: 2022-01-10 | End: 2022-01-14 | Stop reason: HOSPADM

## 2022-01-10 RX ORDER — LIDOCAINE 40 MG/G
CREAM TOPICAL
Status: DISCONTINUED | OUTPATIENT
Start: 2022-01-10 | End: 2022-01-14 | Stop reason: HOSPADM

## 2022-01-10 RX ORDER — HYDROMORPHONE HCL IN WATER/PF 6 MG/30 ML
0.2 PATIENT CONTROLLED ANALGESIA SYRINGE INTRAVENOUS
Status: DISCONTINUED | OUTPATIENT
Start: 2022-01-10 | End: 2022-01-14 | Stop reason: HOSPADM

## 2022-01-10 RX ORDER — ONDANSETRON 4 MG/1
4 TABLET, ORALLY DISINTEGRATING ORAL EVERY 6 HOURS PRN
Status: DISCONTINUED | OUTPATIENT
Start: 2022-01-10 | End: 2022-01-14 | Stop reason: HOSPADM

## 2022-01-10 RX ORDER — ACETAMINOPHEN 650 MG/1
650 SUPPOSITORY RECTAL EVERY 6 HOURS PRN
Status: DISCONTINUED | OUTPATIENT
Start: 2022-01-10 | End: 2022-01-14 | Stop reason: HOSPADM

## 2022-01-10 RX ORDER — SIMETHICONE 40MG/0.6ML
40 SUSPENSION, DROPS(FINAL DOSAGE FORM)(ML) ORAL EVERY 6 HOURS PRN
Status: DISCONTINUED | OUTPATIENT
Start: 2022-01-10 | End: 2022-01-13

## 2022-01-10 RX ORDER — ONDANSETRON 2 MG/ML
4 INJECTION INTRAMUSCULAR; INTRAVENOUS EVERY 6 HOURS PRN
Status: DISCONTINUED | OUTPATIENT
Start: 2022-01-10 | End: 2022-01-14 | Stop reason: HOSPADM

## 2022-01-10 RX ORDER — NALOXONE HYDROCHLORIDE 0.4 MG/ML
0.4 INJECTION, SOLUTION INTRAMUSCULAR; INTRAVENOUS; SUBCUTANEOUS
Status: DISCONTINUED | OUTPATIENT
Start: 2022-01-10 | End: 2022-01-14 | Stop reason: HOSPADM

## 2022-01-10 RX ORDER — PROCHLORPERAZINE MALEATE 5 MG
5 TABLET ORAL EVERY 6 HOURS PRN
Status: DISCONTINUED | OUTPATIENT
Start: 2022-01-10 | End: 2022-01-14 | Stop reason: HOSPADM

## 2022-01-10 RX ORDER — SODIUM CHLORIDE 9 MG/ML
INJECTION, SOLUTION INTRAVENOUS CONTINUOUS
Status: DISCONTINUED | OUTPATIENT
Start: 2022-01-10 | End: 2022-01-13

## 2022-01-10 RX ORDER — HYDROMORPHONE HYDROCHLORIDE 2 MG/1
2 TABLET ORAL EVERY 4 HOURS PRN
Status: DISCONTINUED | OUTPATIENT
Start: 2022-01-10 | End: 2022-01-14 | Stop reason: HOSPADM

## 2022-01-10 RX ORDER — NALOXONE HYDROCHLORIDE 0.4 MG/ML
0.2 INJECTION, SOLUTION INTRAMUSCULAR; INTRAVENOUS; SUBCUTANEOUS
Status: DISCONTINUED | OUTPATIENT
Start: 2022-01-10 | End: 2022-01-14 | Stop reason: HOSPADM

## 2022-01-10 RX ORDER — PIPERACILLIN SODIUM, TAZOBACTAM SODIUM 3; .375 G/15ML; G/15ML
3.38 INJECTION, POWDER, LYOPHILIZED, FOR SOLUTION INTRAVENOUS EVERY 6 HOURS
Status: DISCONTINUED | OUTPATIENT
Start: 2022-01-10 | End: 2022-01-14

## 2022-01-10 RX ORDER — PREDNISONE 5 MG/1
5 TABLET ORAL DAILY
Status: DISCONTINUED | OUTPATIENT
Start: 2022-01-10 | End: 2022-01-14 | Stop reason: HOSPADM

## 2022-01-10 RX ORDER — VITAMIN B COMPLEX
25 TABLET ORAL DAILY
Status: DISCONTINUED | OUTPATIENT
Start: 2022-01-10 | End: 2022-01-14 | Stop reason: HOSPADM

## 2022-01-10 RX ORDER — ASPIRIN 81 MG/1
81 TABLET ORAL DAILY
Status: DISCONTINUED | OUTPATIENT
Start: 2022-01-10 | End: 2022-01-14 | Stop reason: HOSPADM

## 2022-01-10 RX ORDER — ACETAMINOPHEN 325 MG/1
650 TABLET ORAL EVERY 6 HOURS PRN
Status: DISCONTINUED | OUTPATIENT
Start: 2022-01-10 | End: 2022-01-14 | Stop reason: HOSPADM

## 2022-01-10 RX ORDER — MYCOPHENOLATE MOFETIL 500 MG/1
1000 TABLET, FILM COATED ORAL 2 TIMES DAILY
Status: DISCONTINUED | OUTPATIENT
Start: 2022-01-10 | End: 2022-01-14 | Stop reason: HOSPADM

## 2022-01-10 RX ORDER — PROCHLORPERAZINE 25 MG
12.5 SUPPOSITORY, RECTAL RECTAL EVERY 12 HOURS PRN
Status: DISCONTINUED | OUTPATIENT
Start: 2022-01-10 | End: 2022-01-14 | Stop reason: HOSPADM

## 2022-01-10 RX ADMIN — PIPERACILLIN SODIUM AND TAZOBACTAM SODIUM 3.38 G: 3; .375 INJECTION, POWDER, LYOPHILIZED, FOR SOLUTION INTRAVENOUS at 08:37

## 2022-01-10 RX ADMIN — PREDNISONE 5 MG: 5 TABLET ORAL at 13:24

## 2022-01-10 RX ADMIN — ACETAMINOPHEN 650 MG: 325 TABLET, FILM COATED ORAL at 19:27

## 2022-01-10 RX ADMIN — SODIUM CHLORIDE: 9 INJECTION, SOLUTION INTRAVENOUS at 14:13

## 2022-01-10 RX ADMIN — IOPAMIDOL 80 ML: 755 INJECTION, SOLUTION INTRAVENOUS at 00:03

## 2022-01-10 RX ADMIN — ACETAMINOPHEN 650 MG: 325 TABLET, FILM COATED ORAL at 06:31

## 2022-01-10 RX ADMIN — SODIUM CHLORIDE: 9 INJECTION, SOLUTION INTRAVENOUS at 02:16

## 2022-01-10 RX ADMIN — SODIUM CHLORIDE 80 ML: 900 INJECTION INTRAVENOUS at 00:03

## 2022-01-10 RX ADMIN — ASPIRIN 81 MG: 81 TABLET, COATED ORAL at 13:24

## 2022-01-10 RX ADMIN — PIPERACILLIN SODIUM AND TAZOBACTAM SODIUM 3.38 G: 3; .375 INJECTION, POWDER, LYOPHILIZED, FOR SOLUTION INTRAVENOUS at 14:13

## 2022-01-10 RX ADMIN — PIPERACILLIN SODIUM AND TAZOBACTAM SODIUM 3.38 G: 3; .375 INJECTION, POWDER, LYOPHILIZED, FOR SOLUTION INTRAVENOUS at 20:24

## 2022-01-10 RX ADMIN — PIPERACILLIN SODIUM AND TAZOBACTAM SODIUM 3.38 G: 3; .375 INJECTION, POWDER, LYOPHILIZED, FOR SOLUTION INTRAVENOUS at 01:21

## 2022-01-10 RX ADMIN — MYCOPHENOLATE MOFETIL 1000 MG: 500 TABLET, FILM COATED ORAL at 14:14

## 2022-01-10 ASSESSMENT — ACTIVITIES OF DAILY LIVING (ADL)
ADLS_ACUITY_SCORE: 8
ADLS_ACUITY_SCORE: 8
ADLS_ACUITY_SCORE: 10
ADLS_ACUITY_SCORE: 4
ADLS_ACUITY_SCORE: 6
ADLS_ACUITY_SCORE: 4
ADLS_ACUITY_SCORE: 6
ADLS_ACUITY_SCORE: 8
ADLS_ACUITY_SCORE: 10
ADLS_ACUITY_SCORE: 12
ADLS_ACUITY_SCORE: 6
ADLS_ACUITY_SCORE: 6
ADLS_ACUITY_SCORE: 12
ADLS_ACUITY_SCORE: 6
ADLS_ACUITY_SCORE: 8
ADLS_ACUITY_SCORE: 6
ADLS_ACUITY_SCORE: 10
ADLS_ACUITY_SCORE: 6
ADLS_ACUITY_SCORE: 10
ADLS_ACUITY_SCORE: 10
ADLS_ACUITY_SCORE: 6
ADLS_ACUITY_SCORE: 4
ADLS_ACUITY_SCORE: 6

## 2022-01-10 ASSESSMENT — MIFFLIN-ST. JEOR: SCORE: 888.63

## 2022-01-10 NOTE — PROGRESS NOTES
RECEIVING UNIT ED HANDOFF REVIEW    ED Nurse Handoff Report was reviewed by: Tal Sanford RN on January 10, 2022 at 1:03 AM

## 2022-01-10 NOTE — ED PROVIDER NOTES
I received signout on the patient from Dr. Mcdonald.  Please see her full history and physical for further information.  The patient is status post kidney transplant and developed right-sided abdominal pain with some nausea.  CT scan showed inflammation in the jejunal area with tiny area of what appears to be microperforation.  The patient is being admitted to Dr. Gao.  CTA was ordered after discussion with the radiologist.    The CTA returned and was consistent with possible increasing free air from a microperforation secondary to what appears to be jejunal diverticulitis.  I discussed with Dr. Gao who had already ordered antibiotics and was planning to consult surgery.  The patient remained stable without signs of sepsis or septic shock.  She will be admitted as previously planned.     Rianna Lemon MD  01/10/22 0639

## 2022-01-10 NOTE — H&P
[facility]  History and Physical  Hospitalist       Date of Admission:  1/9/2022  Assessment & Plan   Maria E Webster is a very pleasant 81 year old female with h/o renal transplant in 2009, CKD stage II, hypertension, dyslipidemia, depression, anxiety, h/o sigmoid diverticulosis who was admitted on 1/9/2022 with perforated jejunal diverticulitis in an immunosuppressed patient.    Perforated Jejunal Diverticulitis  Right lower quadrant abdominal pain of abrupt onset late afternoon on 1/9/21 (Sunday). Several systolic blood pressure measurements in the 80 range that responded to IV fluid bolus. No elevated WBC at this time but is on chronic immunosuppression meds. Does have history of sigmoid diverticulosis. Abdominal CTA with findings consistent with perforated jejunal diverticulitis.  Imaging not convincing for mesenteric artery occlusions or stenoses. Very sensitive to pain medications so she would prefer acetaminophen over opiates. Low threshold for stress dose steroids given chronic prednisone use.   -admit to inpatient  -IV zosyn  -general surgery consultation  -Pain control: Acetaminophen, p.o./IV Dilaudid  -Antiemetics: Zofran, Compazine  -NPO except meds, ice chips    Post living related donor kidney transplant 2009  CKD stage II  Polycystic kidney disease  On chronic immunosuppression with prednisone and CellCept.  Imaging did not show any hydronephrosis of the left lower quadrant transplanted kidney. Renal function at baseline GFR of 90.   -Resume prior to admission prednisone and CellCept once doses are verified  -Monitor renal function trend    Hypertension  Dyslipidemia  Managed PTA with aspirin.  -hold aspirin for now in case of potential procedure    Ruled Out COVID-19 infection  This patient was evaluated during a global COVID-19 pandemic, which necessitated consideration that the patient might be at risk for infection with the SARS-CoV-2 virus that causes COVID-19.  Applicable protocols for evaluation were followed during the patient's care. Low suspicion for infection. Vaccination status: Fully vaccinated x 3. Pfizer. Most recent dose 8/26/21.   -follow-up COVID-19 PCR test result => negative    Clinically Significant Risk Factors Present on Admission         # Hyponatremia: Na = 130 mmol/L (Ref range: 133 - 144 mmol/L) on admission, will monitor as appropriate      # Platelet Defect: home medication list includes an antiplatelet medication       We are operating under sub optimal conditions in the setting of a worldwide pandemic. This hospital and others in the metro area are at full capacity with limited inpatient and ICU bed availability. This has led to longer wait times in triage, the Emergency Department and outside hospitals and clinics who are referring patients. We are providing the best possible care with limited resources and staffing.     DVT prophylaxis: Pneumatic Compression Devices and Ambulate every shift  Code Status:  Full    Disposition: Expected discharge in 2-3 days.     Medical complexity needing 2nd midnight in the hospital  Based on patient's current status and history, including acute intraabdominal infection and chronic medical issues listed above, I anticipate this patient will be in the hospital more than 2 midnights. Anticipated LOS 2-3 nights and therefore should be admitted to inpatient status.     Stella Gao MD  Text Page    ~~~~~~~~~~~~~~~~~~~~~~~~~~~~~~~~~~~~~~~~~~~~~~~~~~~~~  Primary Care Physician   Jennifer Garcia    Chief Complaint   Abdominal pain    History is obtained from the patient and medical records.    History of Present Illness   Maria E Webster is a very pleasant 81 year old female with h/o renal transplant in 2009, CKD stage II, hypertension, dyslipidemia, depression, anxiety, h/o sigmoid diverticulosis  who presents with abdominal pain. It started abruptly around 4:30 PM today, sharp pain that radiated to her epigastric  "area. Denied tenderness over her transplant kidney site. No dysuria, hematuria, pain with defecation or blood in stool. She tried \"taking it easy\" and seeing if the pain would resolve on its own but it continued to worsen. She hasn't been able to eat or drink due to the pain. Describes that she is very sensitive to pain medicines so prefers to take only tylenol. Presented to the ED and had abnormal abdominal CT which prompted a CTA. This showed probable perforation of jejunal diverticulitis. Her pain is not more \"spread out\" over the entire RLQ and extending into groin and suprapubic area. No fevers, chills, palpitations, chest pressure, shortness of breath.     Case reviewed with Dr. Mcdonald from the Emergency Department. Labs and available imaging reviewed.     Past Medical History    I have reviewed this patient's medical history and updated it with pertinent information if needed.   Past Medical History:   Diagnosis Date     Arthritis      Diverticulosis of colon (without mention of hemorrhage)      Hernia, abdominal      Polycystic kidney, unspecified type     Kidney transplant     PONV (postoperative nausea and vomiting)      Rectocele      Unspecified essential hypertension      Past Surgical History   I have reviewed this patient's surgical history and updated it with pertinent information if needed.  Past Surgical History:   Procedure Laterality Date     C TRANSPLANTATION OF KIDNEY      left     C VAGINAL HYSTERECTOMY  1996    prolapse uterus, bilat. S&O     CYSTOSCOPY       HC COLONOSCOPY THRU STOMA, DIAGNOSTIC  5-03    few diverticulae     HC CORRECT BUNION,SIMPLE       HEMORRHOIDECTOMY       HERNIA REPAIR       TYMPANOPLASTY  5/9/2012    Procedure:TYMPANOPLASTY; Left Tympanoplasty ; Surgeon:BROOKE SANCHEZ; Location:[facility] OR     Prior to Admission Medications   Prior to Admission Medications   Prescriptions Last Dose Informant Patient Reported? Taking?   Cholecalciferol (VITAMIN D) 400 UNIT capsule 1/9/2022 " at AM Self Yes Yes   Sig: Take 1 capsule by mouth daily.   Multiple Vitamin (MULTI-VITAMIN) per tablet 1/9/2022 at AM Self Yes Yes   Sig: Take 1 tablet by mouth daily.   aspirin 81 MG EC tablet 1/9/2022 at AM Self Yes Yes   Sig: Take 81 mg by mouth daily    calcium citrate-vitamin D (CITRACAL) 315-200 MG-UNIT TABS per tablet 1/9/2022 at AM Self Yes Yes   Sig: Take 1 tablet by mouth 2 times daily   denosumab (PROLIA) 60 MG/ML SOSY injection  Self Yes Yes   Sig: Inject 60 mg Subcutaneous every 6 months   mycophenolate (CELLCEPT-BRAND NAME) 500 MG tablet 1/9/2022 at AM Self Yes Yes   Sig: Take 1,000 mg by mouth 2 times daily.   predniSONE (DELTASONE) 5 MG tablet 1/9/2022 at AM Self Yes Yes   Sig: Take 5 mg by mouth daily      Facility-Administered Medications: None     Allergies   Allergies   Allergen Reactions     Codeine Nausea and Vomiting     Darvocet [Propoxyphene N-Apap] Nausea and Vomiting     Morphine Nausea and Vomiting     Social History   I have reviewed this patient's social history and updated it with pertinent information if needed. Maria E Webster  reports that she has never smoked. She has never used smokeless tobacco. She reports current alcohol use. She reports that she does not use drugs.    Family History   I have reviewed this patient's family history and updated it with pertinent information if needed.   Family History   Problem Relation Age of Onset     Genitourinary Problems Father         polycystic kidneys in family     Depression Mother      Hypertension Mother      Depression Sister      Depression Sister      Depression Brother      Hypertension Sister      Review of Systems   The 10 point Review of Systems is negative other than noted in the HPI or here.    Physical Exam   Temp: 98.1  F (36.7  C)   BP: 138/76 Pulse: 71   Resp: 18 SpO2: 97 % O2 Device: None (Room air)    Vital Signs with Ranges  Temp:  [98.1  F (36.7  C)] 98.1  F (36.7  C)  Pulse:  [63-72] 71  Resp:  [18] 18  BP:  ()/(48-76) 138/76  SpO2:  [97 %-100 %] 97 %  112 lbs 0 oz    Constitutional: Alert, NAD, pleasant and interactive  Eyes: PERRL, sclerae anicteric  HEENT: mmm, atraumatic  Respiratory: Lungs CTAB, no wheezes or crackles  Cardiovascular: RRR, no murmurs  no LE edema  GI: soft, no tenderness over left lower quadrant transplant site  +tender RLQ with no rebound or guarding  Skin/Integument: warm, dry, no acute rashes  Genitourinary: not examined  Musc: VENCES, normal limb strength x 4  Neuro: AOx3, no focal deficits, no tremors  Psych: friendly affect, not anxious, not confused      Data   Data reviewed today:  I personally reviewed: CTA with contrast, abdomen: 5 cm region of haziness in the mesenteric fat of the mid left hemiabdomen, suggestive of inflammation. There are several foci of extraluminal gas in this region that are consistent with a localized small bowel perforation. The extent of the haziness and amount of extraluminal gas have mildly increased since the very recent comparison CT scan. There are several small diverticula in the adjacent jejunum and therefore perforated jejunal diverticulitis is the most likely cause of these findings. No evidence of occlusion or significant stenosis of the major branches of the celiac, superior mesenteric or inferior mesenteric arteries. A few loops of mildly dilated small bowel are present in the anterior aspect of the mid abdomen and the more distal bowel is relatively decompressed, new since the comparison study. This is suggestive of a partial small bowel obstruction that could possibly relate to the aforementioned abnormal jejunal findings. A very small amount of free fluid in the pelvis.   Recent Labs   Lab 01/09/22  2049   WBC 6.7   HGB 12.2   MCV 93      *   POTASSIUM 3.7   CHLORIDE 99   CO2 25   BUN 35*   CR 0.62   ANIONGAP 6   BERNARDA 8.6   *   ALBUMIN 3.6   PROTTOTAL 7.1   BILITOTAL 0.4   ALKPHOS 51   ALT 26   AST 15   LIPASE 243        Imaging:  Recent Results (from the past 24 hour(s))   CT Abdomen pelvis - oral contrast only    Narrative    EXAM: CT ABDOMEN PELVIS W/O CONTRAST  LOCATION: [facility]  DATE/TIME: 1/9/2022 10:44 PM    INDICATION: Right-sided abdominal pain, kidney transplant on left.  COMPARISON: 01/14/2016.  TECHNIQUE: CT scan of the abdomen and pelvis was performed without IV contrast. Multiplanar reformats were obtained. Dose reduction techniques were used.  CONTRAST: None.    FINDINGS:   LOWER CHEST: Small amount of contrast seen within the esophagus, correlate to exclude gastroesophageal reflux.    HEPATOBILIARY: Extensive hepatic cysts, not significantly changed, in combination with known renal cysts consistent with polycystic kidney disease.    PANCREAS: Normal.    SPLEEN: Normal.    ADRENAL GLANDS: Normal.    KIDNEYS/BLADDER: Bilateral enlarged polycystic kidneys consistent with polycystic kidney disease. Some of which appear more hyperdense, likely representing proteinaceous or hemorrhagic cysts. However, a new solid-appearing nodule measuring 2.3 cm is seen   arising from the inferior pole of the right kidney (2, 53), attention to detail on follow-up imaging with contrast.     Left renal transplant kidney. No hydroureteronephrosis.    BOWEL: Short segment loop of mural thickening along the mesenteric border of a loop of jejunum in the left mid abdomen (2, 51). Punctate foci of extramural air (2, 41) possibly free or within the vasculature. Trace free fluid. No obstruction,   appendicitis, diverticulitis, or colitis. Mild diverticulosis.    LYMPH NODES: Normal.    VASCULATURE: No aneurysm. Severe atherosclerotic vascular disease.    PELVIC ORGANS: Absent uterus. Pessary device.    MUSCULOSKELETAL: Multilevel discogenic degenerative change.      Impression    IMPRESSION:   1.  Short segment mural thickening of the jejunum with inflammatory change of the adjacent mesenteric border. Small  punctate foci of air within the mesentery or vessel raise concern for ischemia and/or focal perforation. Diagnostic considerations are   concerning for focal enteritis, ischemic, inflammatory, or infectious, or jejunal diverticulitis. Recommend follow-up to resolution to exclude underlying malignancy or small bowel lymphoma.  2.  Polycystic kidney disease.  3.  Left pelvic renal transplant.  4.  Severe atherosclerotic vascular disease and coronary artery disease.  5.  2.3 cm enlarged soft tissue density focus seen arising from the inferior pole of the right kidney, though this may reflect a hyperdense cyst, recommend attention to detail on follow-up imaging with contrast.    Findings were discussed with Dr. Young at 11:11 PM on 01/09/2022.

## 2022-01-10 NOTE — PLAN OF CARE
[id number]:    Transferred from ED last night. A/Ox4. VSS on RA. On NPO except ice chip and meds. Up SBA. Denied N/V. C/o abdominal pain, managed with Tylenol PRN PO x1.  Continent for B/B, purewick in place to help not get OOB many times per pt's request. Discharge pending to improvement.

## 2022-01-10 NOTE — ED NOTES
[facility]  ED Nurse Handoff Report    ED Chief complaint: Abdominal Pain      ED Diagnosis:   Final diagnoses:   Abdominal pain, generalized       Code Status: To be addressed with admitting MD    Allergies:   Allergies   Allergen Reactions     Codeine Nausea and Vomiting     Darvocet [Propoxyphene N-Apap] Nausea and Vomiting     Morphine Nausea and Vomiting       Patient Story: From home. Abdominal pain starting today, not passing gas, nausea, no vomiting or diarrhea.   Focused Assessment:    Cardiac WDL  Resp WDL  Neuro WDL  GI Abd pain    Treatments and/or interventions provided:   Results for orders placed or performed during the hospital encounter of 01/09/22   CT Abdomen pelvis - oral contrast only     Status: None    Narrative    EXAM: CT ABDOMEN PELVIS W/O CONTRAST  LOCATION: [facility]  DATE/TIME: 1/9/2022 10:44 PM    INDICATION: Right-sided abdominal pain, kidney transplant on left.  COMPARISON: 01/14/2016.  TECHNIQUE: CT scan of the abdomen and pelvis was performed without IV contrast. Multiplanar reformats were obtained. Dose reduction techniques were used.  CONTRAST: None.    FINDINGS:   LOWER CHEST: Small amount of contrast seen within the esophagus, correlate to exclude gastroesophageal reflux.    HEPATOBILIARY: Extensive hepatic cysts, not significantly changed, in combination with known renal cysts consistent with polycystic kidney disease.    PANCREAS: Normal.    SPLEEN: Normal.    ADRENAL GLANDS: Normal.    KIDNEYS/BLADDER: Bilateral enlarged polycystic kidneys consistent with polycystic kidney disease. Some of which appear more hyperdense, likely representing proteinaceous or hemorrhagic cysts. However, a new solid-appearing nodule measuring 2.3 cm is seen   arising from the inferior pole of the right kidney (2, 53), attention to detail on follow-up imaging with contrast.     Left renal transplant kidney. No hydroureteronephrosis.    BOWEL: Short  segment loop of mural thickening along the mesenteric border of a loop of jejunum in the left mid abdomen (2, 51). Punctate foci of extramural air (2, 41) possibly free or within the vasculature. Trace free fluid. No obstruction,   appendicitis, diverticulitis, or colitis. Mild diverticulosis.    LYMPH NODES: Normal.    VASCULATURE: No aneurysm. Severe atherosclerotic vascular disease.    PELVIC ORGANS: Absent uterus. Pessary device.    MUSCULOSKELETAL: Multilevel discogenic degenerative change.      Impression    IMPRESSION:   1.  Short segment mural thickening of the jejunum with inflammatory change of the adjacent mesenteric border. Small punctate foci of air within the mesentery or vessel raise concern for ischemia and/or focal perforation. Diagnostic considerations are   concerning for focal enteritis, ischemic, inflammatory, or infectious, or jejunal diverticulitis. Recommend follow-up to resolution to exclude underlying malignancy or small bowel lymphoma.  2.  Polycystic kidney disease.  3.  Left pelvic renal transplant.  4.  Severe atherosclerotic vascular disease and coronary artery disease.  5.  2.3 cm enlarged soft tissue density focus seen arising from the inferior pole of the right kidney, though this may reflect a hyperdense cyst, recommend attention to detail on follow-up imaging with contrast.    Findings were discussed with Dr. Young at 11:11 PM on 01/09/2022.     CTA Abdomen Pelvis with Contrast     Status: None (Preliminary result)    Narrative    EXAM: CTA ABDOMEN AND PELVIS WITH CONTRAST  LOCATION: [facility]  DATE/TIME: 1/9/2022 11:57 PM    INDICATION: Right-sided abdominal pain. Possible mesenteric ischemia.  COMPARISON: 01/09/2022 at 2244 hours.  TECHNIQUE: CT angiogram abdomen pelvis during arterial and portal venous phases of injection of IV contrast. 2D and 3D MIP reconstructions were performed by the CT technologist. Dose reduction techniques were  used.  CONTRAST: 80 mL Isovue-370.    FINDINGS:  ANGIOGRAM ABDOMEN AND PELVIS: The abdominal aorta is normal in caliber without dissection. Atherosclerotic calcification in the abdominal aorta. No significant stenosis or occlusion of the major branches of the celiac, superior mesenteric and inferior   mesenteric arteries. Severe narrowing of the right renal artery. Probable chronic occlusion of the left renal artery.    LOWER CHEST: Coronary artery calcification.    HEPATOBILIARY: Numerous cysts of varying sizes scattered within the liver, the largest a 6.6 cm cyst in the right lobe.    PANCREAS: Unremarkable.    SPLEEN: Unremarkable.    ADRENAL GLANDS: Unremarkable.    KIDNEYS/BLADDER: Innumerable cysts of varying sizes scattered throughout bilateral native kidneys, the largest in the right kidney and measuring 7 cm. There is no enhancement of the 2.3 cm mildly hyperattenuating lesion in the inferior pole of the right   kidney that was described on the recent comparison study and therefore is consistent with a hemorrhagic or proteinaceous cyst. There is moderate to severe atrophy of the parenchyma of the native kidneys. Left lower quadrant renal transplant.    BOWEL: A few loops of mildly dilated small bowel are present in the anterior aspect of the mid abdomen. No colonic distention. A 5 cm region of haziness is present in the mesenteric fat associated with jejunum in the mid left hemiabdomen (for example,   series 6 image 107). There are several foci of extraluminal gas in the bowel mesentery at this location. There appear to be small diverticula in the jejunum in this region. The haziness and extraluminal gas have increased since the recent comparison   study. The appendix is not visualized.    LYMPH NODES: Unremarkable.    PELVIC ORGANS: A pessary is present in the vagina.    MUSCULOSKELETAL: No acute findings.    OTHER: A very small amount of free fluid in the pelvis. Tiny paraumbilical hernia containing  fat.      Impression    IMPRESSION:  1.  A 5 cm region of haziness in the mesenteric fat of the mid left hemiabdomen, suggestive of inflammation. There are several foci of extraluminal gas in this region that are consistent with a localized bowel perforation. The extent of the haziness and   amount of extraluminal gas have increased since the very recent comparison CT scan. While there is no definite cause of bowel perforation, there are several small diverticula in the adjacent jejunum and therefore perforated diverticulitis is most likely.  2.  No evidence of occlusion or significant stenosis of the major branches of the celiac, superior mesenteric or inferior mesenteric arteries.  3.  A few loops of mildly dilated small bowel are present in the anterior aspect of the midabdomen and the more distal bowel is relatively decompressed, new since the comparison study. This is suggestive of a partial small bowel obstruction that could   possibly relate to the aforementioned bowel findings.  4.  A very small amount of free fluid in the pelvis.    Findings were called to Dr. Lemon by Dr. Reich on 01/10/2022 at 0038 hours.   Lipase     Status: Normal   Result Value Ref Range    Lipase 243 73 - 393 U/L   Comprehensive metabolic panel     Status: Abnormal   Result Value Ref Range    Sodium 130 (L) 133 - 144 mmol/L    Potassium 3.7 3.4 - 5.3 mmol/L    Chloride 99 94 - 109 mmol/L    Carbon Dioxide (CO2) 25 20 - 32 mmol/L    Anion Gap 6 3 - 14 mmol/L    Urea Nitrogen 35 (H) 7 - 30 mg/dL    Creatinine 0.62 0.52 - 1.04 mg/dL    Calcium 8.6 8.5 - 10.1 mg/dL    Glucose 142 (H) 70 - 99 mg/dL    Alkaline Phosphatase 51 40 - 150 U/L    AST 15 0 - 45 U/L    ALT 26 0 - 50 U/L    Protein Total 7.1 6.8 - 8.8 g/dL    Albumin 3.6 3.4 - 5.0 g/dL    Bilirubin Total 0.4 0.2 - 1.3 mg/dL    GFR Estimate 89 >60 mL/min/1.73m2   CBC with platelets and differential     Status: Abnormal   Result Value Ref Range    WBC Count 6.7 4.0 - 11.0 10e3/uL     RBC Count 4.08 3.80 - 5.20 10e6/uL    Hemoglobin 12.2 11.7 - 15.7 g/dL    Hematocrit 37.9 35.0 - 47.0 %    MCV 93 78 - 100 fL    MCH 29.9 26.5 - 33.0 pg    MCHC 32.2 31.5 - 36.5 g/dL    RDW 13.8 10.0 - 15.0 %    Platelet Count 276 150 - 450 10e3/uL    % Neutrophils 89 %    % Lymphocytes 6 %    % Monocytes 5 %    % Eosinophils 0 %    % Basophils 0 %    % Immature Granulocytes 0 %    NRBCs per 100 WBC 0 <1 /100    Absolute Neutrophils 6.0 1.6 - 8.3 10e3/uL    Absolute Lymphocytes 0.4 (L) 0.8 - 5.3 10e3/uL    Absolute Monocytes 0.3 0.0 - 1.3 10e3/uL    Absolute Eosinophils 0.0 0.0 - 0.7 10e3/uL    Absolute Basophils 0.0 0.0 - 0.2 10e3/uL    Absolute Immature Granulocytes 0.0 <=0.4 10e3/uL    Absolute NRBCs 0.0 10e3/uL   Extra Red Top Tube     Status: None   Result Value Ref Range    Hold Specimen JIC    Lactic acid whole blood     Status: Normal   Result Value Ref Range    Lactic Acid 1.5 0.7 - 2.0 mmol/L   Asymptomatic COVID-19 Virus (Coronavirus) by PCR Nasopharyngeal     Status: Normal    Specimen: Nasopharyngeal; Swab   Result Value Ref Range    SARS CoV2 PCR Negative Negative    Narrative    Testing was performed using the [facility]  SARS-CoV-2 & Influenza A/B Assay on the [facility]  [employer]  System.  This test should be ordered for the detection of SARS-COV-2 in individuals who meet SARS-CoV-2 clinical and/or epidemiological criteria. Test performance is unknown in asymptomatic patients.  This test is for in vitro diagnostic use under the FDA EUA for laboratories certified under CLIA to perform moderate and/or high complexity testing. This test has not been FDA cleared or approved.  A negative test does not rule out the presence of PCR inhibitors in the specimen or target RNA in concentration below the limit of detection for the assay. The possibility of a false negative should be considered if the patient's recent exposure or clinical presentation suggests COVID-19.  [facility] [employer] are certified under the  Clinical Laboratory Improvement Amendments of 1988 (CLIA-88) as qualified to perform moderate and/or high complexity laboratory testing.   CBC with Platelets & Differential     Status: Abnormal    Narrative    The following orders were created for panel order CBC with Platelets & Differential.  Procedure                               Abnormality         Status                     ---------                               -----------         ------                     CBC with platelets and d...[[id number]]  Abnormal            Final result                 Please view results for these tests on the individual orders.   [place] Draw     Status: None    Narrative    The following orders were created for panel order [place] Draw.  Procedure                               Abnormality         Status                     ---------                               -----------         ------                     Extra Red Top Tube[[id number]]                               Final result                 Please view results for these tests on the individual orders.       Patient's response to treatments and/or interventions: Tolerated well    To be done/followed up on inpatient unit:  GI    Does this patient have any cognitive concerns?: None    Activity level - Baseline/Home:  Independent  Activity Level - Current:   Independent    Patient's Preferred language: English   Needed?: No    Isolation: None  Infection: Not Applicable  Patient tested for COVID 19 prior to admission: YES  Bariatric?: No    Vital Signs:   Vitals:    01/09/22 2230 01/09/22 2300 01/09/22 2330 01/10/22 0030   BP: (!) 87/74 138/76 134/63 133/68   Pulse: 71  65 84   Resp:       Temp:       SpO2:  97% 97% 97%   Weight:       Height:           Cardiac Rhythm:     Was the PSS-3 completed:   Yes  What interventions are required if any?               Family Comments: Family at bedside  OBS brochure/video discussed/provided to patient/family: N/A               Name of person given brochure if not patient:                Relationship to patient:      For the majority of the shift this patient's behavior was Green.   Behavioral interventions performed were  .    ED NURSE PHONE NUMBER: *[phone]

## 2022-01-10 NOTE — ED PROVIDER NOTES
"  History   Chief Complaint:  Abdominal Pain     The history is provided by the patient.      Maria E Webster is a 81 year old female with history of s/p kidney transplant who presents for evaluation of abdominal pain. The patient reports that around 1630 she developed new mid to upper abdominal pain that has moved a bit towards her lower abdomen as the day has progressed. She states she has been belching and last had a bowel movement around 1 hour prior to evaluation. She states that her main concern is for her persisting pain and that she has not passed gas since the pain started. She notes that she has had some mild nausea with this as well but denies any vomiting and diarrhea. She states she has not missed any transplant medications. She is COVID vaccinated and boosted.     Review of Systems   Gastrointestinal: Positive for abdominal pain and nausea. Negative for diarrhea and vomiting.   All other systems reviewed and are negative.    Allergies:  Codeine  Darvocet [Propoxyphene N-Apap]  Morphine    Medications:  Vitamin D  Colace  Metoprolol Tartrate  Prilosec  Miralax  Sennosides  Simvastatin    Past Medical History:    Arthritis   Diverticulosis of colon (without mention of hemorrhage)   Hernia, abdominal   Polycystic kidney, unspecified type   PONV (postoperative nausea and vomiting)   Rectocele   Unspecified essential hypertension      Past Surgical History:    Transplantation of Kidney Left  Vaginal Hysterectomy  Cystoscopy  Correct Bunion, Simple   Hemorrhoidectomy  Hernia Repair  Tympanoplasty     Family History:    Depression x4  Polycystic Kidneys  Hypertension x2    Social History:  The patient presents to the ED with spouse.    Physical Exam     Patient Vitals for the past 24 hrs:   BP Temp Pulse Resp SpO2 Height Weight   01/09/22 2033 111/62 98.1  F (36.7  C) 66 18 100 % 1.499 m (4' 11\") 50.8 kg (112 lb)       Physical Exam  General: Resting on the gurney, appears  uncomfortable  Head:  The scalp, " face, and head appear normal  Mouth/Throat: Mucus membranes are moist  CV:  Regular rate    Normal S1 and S2  No pathological murmur   Resp:  Breath sounds clear and equal bilaterally    Non-labored, no retractions or accessory muscle use    No coarseness    No wheezing   GI:  Abdomen is soft,     Right-sided tenderness to palpation with voluntary guarding.    Transplanted kidney palpable left lower quadrant.  No left-sided tenderness  MS:  Normal motor assessment of all extremities.    Good capillary refill noted.  Skin:   No rash or lesions noted.  Neuro: Speech is normal and fluent. No apparent deficit.  Psych: Awake. Alert.  Normal affect.      Appropriate interactions.      Emergency Department Course       Imaging:  CT Abdomen pelvis - oral contrast only   Preliminary Result   IMPRESSION:    1.  Short segment mural thickening of the jejunum with inflammatory change of the adjacent mesenteric border. Small punctate foci of air within the mesentery or vessel raise concern for ischemia and/or focal perforation. Diagnostic considerations are    concerning for focal enteritis, ischemic, inflammatory, or infectious, or jejunal diverticulitis. Recommend follow-up to resolution to exclude underlying malignancy or small bowel lymphoma.   2.  Polycystic kidney disease.   3.  Left pelvic renal transplant.   4.  Severe atherosclerotic vascular disease and coronary artery disease.   5.  2.3 cm enlarged soft tissue density focus seen arising from the inferior pole of the right kidney, though this may reflect a hyperdense cyst, recommend attention to detail on follow-up imaging with contrast.      Findings were discussed with Dr. Young at 11:11 PM on 01/09/2022.         CTA Abdomen Pelvis with Contrast    (Results Pending)   Report per radiology     Laboratory:  Labs Ordered and Resulted from Time of ED Arrival to Time of ED Departure   COMPREHENSIVE METABOLIC PANEL - Abnormal       Result Value    Sodium 130 (*)      Potassium 3.7      Chloride 99      Carbon Dioxide (CO2) 25      Anion Gap 6      Urea Nitrogen 35 (*)     Creatinine 0.62      Calcium 8.6      Glucose 142 (*)     Alkaline Phosphatase 51      AST 15      ALT 26      Protein Total 7.1      Albumin 3.6      Bilirubin Total 0.4      GFR Estimate 89     CBC WITH PLATELETS AND DIFFERENTIAL - Abnormal    WBC Count 6.7      RBC Count 4.08      Hemoglobin 12.2      Hematocrit 37.9      MCV 93      MCH 29.9      MCHC 32.2      RDW 13.8      Platelet Count 276      % Neutrophils 89      % Lymphocytes 6      % Monocytes 5      % Eosinophils 0      % Basophils 0      % Immature Granulocytes 0      NRBCs per 100 WBC 0      Absolute Neutrophils 6.0      Absolute Lymphocytes 0.4 (*)     Absolute Monocytes 0.3      Absolute Eosinophils 0.0      Absolute Basophils 0.0      Absolute Immature Granulocytes 0.0      Absolute NRBCs 0.0     LIPASE - Normal    Lipase 243         Emergency Department Course:  Reviewed:  I reviewed nursing notes, vitals and past medical history    Assessments:  2100 I performed a physical exam of the patient. Findings as above.      Patient rechecked and updated. Plan of care discussed and questions answered.     Consults:     I spoke with Dr. Brunner of the radiology service regarding patient's presentation, findings, and imaging.    Interventions:  Medications   0.9% sodium chloride BOLUS (has no administration in time range)   iohexol (OMNIPAQUE) solution 25 mL (25 mLs Oral Given 1/9/22 2222)       Disposition:  The patient was admitted to the hospital.     Impression & Plan     Covid-19  Maria E Webster was evaluated during a global COVID-19 pandemic, which necessitated consideration that the patient might be at risk for infection with the SARS-CoV-2 virus that causes COVID-19.   Applicable protocols for evaluation were followed during the patient's care.   COVID-19 was considered as part of the patient's evaluation. The plan for testing is: a test was  obtained during this visit.     Medical Decision Making:  Maria E Webster is a 81 year old female who presents with abdominal pain.  The patient is a kidney transplant patient and reports that her pain is quite severe.  She is very tender on the right side of her abdomen on exam.  CT with oral contrast was obtained which showed a significantly inflamed loop of ileum with small foci of mesneteric air.  After further discussion with the reading radiologist he requested a CTA of the abdomen and pelvis with a portal venous phase.  This was ordered.  The patient was then signed out to my partner, Dr. Lemon, pending additional imaging.  The patient will be admitted, however, the necessity of surgical intervention and final CT read is not yet complete - Dr. Lemon will follow the imaging results and make additional calls as needed.    Diagnosis:    ICD-10-CM    1. Abdominal pain, generalized  R10.84             Cheri Mcdonald MD  01/09/22 3876

## 2022-01-10 NOTE — PROGRESS NOTES
[facility]    Medicine Progress Note - Hospitalist Service       Date of Admission:  1/9/2022    Assessment & Plan           Maria E Webster is a very pleasant 81 year old female with h/o renal transplant in 2009, CKD stage II, hypertension, dyslipidemia, depression, anxiety, h/o sigmoid diverticulosis who was admitted on 1/9/2022 with perforated jejunal diverticulitis in an immunosuppressed patient.    Perforated Jejunal Diverticulitis  Right lower quadrant abdominal pain of abrupt onset late afternoon on 1/9/21 (Sunday). Several systolic blood pressure measurements in the 80 range that responded to IV fluid bolus. No elevated WBC at this time but is on chronic immunosuppression meds. Does have history of sigmoid diverticulosis. Abdominal CTA with findings consistent with perforated jejunal diverticulitis.  Imaging not convincing for mesenteric artery occlusions or stenoses. Very sensitive to pain medications so she would prefer acetaminophen over opiates.    > continue conservative care with IV antibiotics and pain medications  > surgery consult pending  > diet advancement per surgery    Post living related donor kidney transplant 2009  CKD stage II  Polycystic kidney disease  On chronic immunosuppression with prednisone and CellCept.  Imaging did not show any hydronephrosis of the left lower quadrant transplanted kidney. Renal function at baseline GFR of 90.     >Resume prior to admission prednisone and CellCept  >Monitor renal function trend    Hypertension  Dyslipidemia  Managed PTA with aspirin, restart    Ruled Out COVID-19 infection  This patient was evaluated during a global COVID-19 pandemic, which necessitated consideration that the patient might be at risk for infection with the SARS-CoV-2 virus that causes COVID-19. Applicable protocols for evaluation were followed during the patient's care. Low suspicion for infection. Vaccination status: Fully vaccinated x 3. Pfizer. Most  recent dose 8/26/21.   -follow-up COVID-19 PCR test result => negative         Diet: NPO for Medical/Clinical Reasons Except for: Meds, Ice Chips    DVT Prophylaxis: PCDs until plan for possible surgery  Jenkins Catheter: Not present  Central Lines: None  Code Status: Full Code      Disposition Plan   Expected Discharge: 2-3 days pending plan form surgery and antibiotics plan    Discussed with patient and her nurse.    Margaret Ybarra DO  Hospitalist Service  [facility]  Securely message with the Vocera Web Console (learn more here)  Text page via [employer] Paging/Directory        Clinically Significant Risk Factors Present on Admission         # Hyponatremia: Na = 130 mmol/L (Ref range: 133 - 144 mmol/L) on admission, will monitor as appropriate      # Platelet Defect: home medication list includes an antiplatelet medication       ______________________________________________________________________    Interval History   Patient doing well this morning. Denies nausea or pain. Main complaint is bloating. She is also weak and tired. No headache. No chest pain. Breathing stable. Lab BM yesterday and was mostly normal.    Data reviewed today: I reviewed all medications, new labs and imaging results over the last 24 hours. I personally reviewed CT with contained perf and renal transplant    Physical Exam   Vital Signs: Temp: 96.8  F (36  C) Temp src: Oral BP: 124/48 Pulse: 75   Resp: 24 SpO2: 95 % O2 Device: None (Room air)    Weight: 114 lbs 3.17 oz     Constitutional: Awake, alert, cooperative, no apparent distress  Respiratory: Clear to auscultation bilaterally, no crackles or wheezing  Cardiovascular: Regular rate and rhythm, normal S1 and S2, and no murmur noted  GI: Normal bowel sounds, generalized TTP with mild guarding  Skin/Integumen: No rashes, no cyanosis, no edema  MSK: no joint swelling    Data   Recent Labs   Lab 01/09/22  2049   WBC 6.7   HGB 12.2   MCV 93      *    POTASSIUM 3.7   CHLORIDE 99   CO2 25   BUN 35*   CR 0.62   ANIONGAP 6   BERNARDA 8.6   *   ALBUMIN 3.6   PROTTOTAL 7.1   BILITOTAL 0.4   ALKPHOS 51   ALT 26   AST 15   LIPASE 243     Recent Results (from the past 24 hour(s))   CT Abdomen pelvis - oral contrast only    Narrative    EXAM: CT ABDOMEN PELVIS W/O CONTRAST  LOCATION: [facility]  DATE/TIME: 1/9/2022 10:44 PM    INDICATION: Right-sided abdominal pain, kidney transplant on left.  COMPARISON: 01/14/2016.  TECHNIQUE: CT scan of the abdomen and pelvis was performed without IV contrast. Multiplanar reformats were obtained. Dose reduction techniques were used.  CONTRAST: None.    FINDINGS:   LOWER CHEST: Small amount of contrast seen within the esophagus, correlate to exclude gastroesophageal reflux.    HEPATOBILIARY: Extensive hepatic cysts, not significantly changed, in combination with known renal cysts consistent with polycystic kidney disease.    PANCREAS: Normal.    SPLEEN: Normal.    ADRENAL GLANDS: Normal.    KIDNEYS/BLADDER: Bilateral enlarged polycystic kidneys consistent with polycystic kidney disease. Some of which appear more hyperdense, likely representing proteinaceous or hemorrhagic cysts. However, a new solid-appearing nodule measuring 2.3 cm is seen   arising from the inferior pole of the right kidney (2, 53), attention to detail on follow-up imaging with contrast.     Left renal transplant kidney. No hydroureteronephrosis.    BOWEL: Short segment loop of mural thickening along the mesenteric border of a loop of jejunum in the left mid abdomen (2, 51). Punctate foci of extramural air (2, 41) possibly free or within the vasculature. Trace free fluid. No obstruction,   appendicitis, diverticulitis, or colitis. Mild diverticulosis.    LYMPH NODES: Normal.    VASCULATURE: No aneurysm. Severe atherosclerotic vascular disease.    PELVIC ORGANS: Absent uterus. Pessary device.    MUSCULOSKELETAL: Multilevel discogenic  degenerative change.      Impression    IMPRESSION:   1.  Short segment mural thickening of the jejunum with inflammatory change of the adjacent mesenteric border. Small punctate foci of air within the mesentery or vessel raise concern for ischemia and/or focal perforation. Diagnostic considerations are   concerning for focal enteritis, ischemic, inflammatory, or infectious, or jejunal diverticulitis. Recommend follow-up to resolution to exclude underlying malignancy or small bowel lymphoma.  2.  Polycystic kidney disease.  3.  Left pelvic renal transplant.  4.  Severe atherosclerotic vascular disease and coronary artery disease.  5.  2.3 cm enlarged soft tissue density focus seen arising from the inferior pole of the right kidney, though this may reflect a hyperdense cyst, recommend attention to detail on follow-up imaging with contrast.    Findings were discussed with Dr. Young at 11:11 PM on 01/09/2022.     CTA Abdomen Pelvis with Contrast    Narrative    EXAM: CTA ABDOMEN AND PELVIS WITH CONTRAST  LOCATION: [facility]  DATE/TIME: 1/9/2022 11:57 PM    INDICATION: Right-sided abdominal pain. Possible mesenteric ischemia.  COMPARISON: 01/09/2022 at 2244 hours.  TECHNIQUE: CT angiogram abdomen pelvis during arterial and portal venous phases of injection of IV contrast. 2D and 3D MIP reconstructions were performed by the CT technologist. Dose reduction techniques were used.  CONTRAST: 80 mL Isovue-370.    FINDINGS:  ANGIOGRAM ABDOMEN AND PELVIS: The abdominal aorta is normal in caliber without dissection. Atherosclerotic calcification in the abdominal aorta. No significant stenosis or occlusion of the major branches of the celiac, superior mesenteric and inferior   mesenteric arteries. Severe narrowing of the right renal artery. Probable chronic occlusion of the left renal artery.    LOWER CHEST: Coronary artery calcification.    HEPATOBILIARY: Numerous cysts of varying sizes scattered  within the liver, the largest a 6.6 cm cyst in the right lobe.    PANCREAS: Unremarkable.    SPLEEN: Unremarkable.    ADRENAL GLANDS: Unremarkable.    KIDNEYS/BLADDER: Innumerable cysts of varying sizes scattered throughout bilateral native kidneys, the largest in the right kidney and measuring 7 cm. The appearance is consistent with autosomal dominant polycystic kidney disease. There is no enhancement   of the 2.3 cm mildly hyperattenuating lesion in the inferior pole of the right kidney that was described on the recent comparison study and therefore is consistent with a hemorrhagic or proteinaceous cyst. There is moderate to severe atrophy of the   parenchyma of the native kidneys. Left lower quadrant renal transplant.    BOWEL: A few loops of mildly dilated small bowel are present in the anterior aspect of the mid abdomen. The more distal small bowel and colon are relatively nondistended. A 5 cm region of haziness is present in the mesenteric fat that is closely   associated with jejunum in the mid left hemiabdomen (for example, series 6 image 107). There are several foci of extraluminal gas in the bowel mesentery at this location. There are a few small diverticula in the jejunum in this region. The haziness and   extraluminal gas have increased since the recent comparison study. The appendix is not visualized.    LYMPH NODES: Unremarkable.    PELVIC ORGANS: A pessary is present in the vagina.    MUSCULOSKELETAL: No acute findings.    OTHER: A very small amount of free fluid in the pelvis. Tiny paraumbilical hernia containing fat.      Impression    IMPRESSION:  1.  A 5 cm region of haziness in the mesenteric fat of the mid left hemiabdomen, suggestive of inflammation. There are several foci of extraluminal gas in this region that are consistent with a localized small bowel perforation. The extent of the   haziness and amount of extraluminal gas have mildly increased since the very recent comparison CT scan.  There are several small diverticula in the adjacent jejunum and therefore perforated jejunal diverticulitis is the most likely cause of these   findings.  2.  No evidence of occlusion or significant stenosis of the major branches of the celiac, superior mesenteric or inferior mesenteric arteries.  3.  A few loops of mildly dilated small bowel are present in the anterior aspect of the mid abdomen and the more distal bowel is relatively decompressed, new since the comparison study. This is suggestive of a partial small bowel obstruction that could   possibly relate to the aforementioned abnormal jejunal findings.  4.  A very small amount of free fluid in the pelvis.    Findings were called to Dr. Lemon by Dr. Reich on 01/10/2022 at 0038 hours.     Medications     sodium chloride 100 mL/hr at 01/10/22 0216       aspirin  81 mg Oral Daily     calcium citrate-vitamin D  1 tablet Oral BID     cholecalciferol  25 mcg Oral Daily     mycophenolate  1,000 mg Oral BID     piperacillin-tazobactam  3.375 g Intravenous Q6H     predniSONE  5 mg Oral Daily     sodium chloride (PF)  3 mL Intracatheter Q8H

## 2022-01-10 NOTE — PHARMACY-ADMISSION MEDICATION HISTORY
Pharmacy Medication History  Admission medication history interview status for the 1/9/2022  admission is complete. See EPIC admission navigator for prior to admission medications     Location of Interview: Patient room  Medication history sources: Patient and Care Everywhere    Significant changes made to the medication list:  Added: Prolia, prednisone  Removed: docusate, estradiol, metoprolol tartrate, omeprazole, phenazopyridine, Miralax, prednisolone, sennosides, simvastatin  Changed: calcium/vit D daily --> BID    In the past week, patient estimated taking medication this percent of the time: greater than 90%    Additional medication history information:   none    Medication reconciliation completed by provider prior to medication history? No    Time spent in this activity: 15 mins    Prior to Admission medications    Medication Sig Last Dose Taking? Auth Provider   aspirin 81 MG EC tablet Take 81 mg by mouth daily  1/9/2022 at AM Yes Reported, Patient   calcium citrate-vitamin D (CITRACAL) 315-200 MG-UNIT TABS per tablet Take 1 tablet by mouth 2 times daily 1/9/2022 at AM Yes Unknown, Entered By History   Cholecalciferol (VITAMIN D) 400 UNIT capsule Take 1 capsule by mouth daily. 1/9/2022 at AM Yes Reported, Patient   denosumab (PROLIA) 60 MG/ML SOSY injection Inject 60 mg Subcutaneous every 6 months  Yes Unknown, Entered By History   Multiple Vitamin (MULTI-VITAMIN) per tablet Take 1 tablet by mouth daily. 1/9/2022 at AM Yes Reported, Patient   mycophenolate (CELLCEPT-BRAND NAME) 500 MG tablet Take 1,000 mg by mouth 2 times daily. 1/9/2022 at AM Yes Reported, Patient   predniSONE (DELTASONE) 5 MG tablet Take 5 mg by mouth daily 1/9/2022 at AM Yes Unknown, Entered By History       The information provided in this note is only as accurate as the sources available at the time of update(s)     Angélica Stock PharmD

## 2022-01-10 NOTE — CONSULTS
"[facility] General Surgery Consultation    Maria E Webster MRN# [id number]   YOB: 1940 Age: 81 year old      Date of Admission:  1/9/2022  Date of Consult: 1/10/2022         Assessment and Plan:   Patient is a 81 year old female with contained perforation of jejunal diverticulum     PLAN:  - Medical management per primary team  - NPO and IVF's- okay to have medications and ice chips  - Pain control prn  - IV antibiotics  - No indication for emergent surgical intervention  - Surgery to follow         Requesting Physician:              Chief Complaint:     Chief Complaint   Patient presents with     Abdominal Pain          History of Present Illness:   Maria E Webster is a 81 year old female who was seen in consultation at the request of  who presented with abdominal pain.  Patient describes acute onset of abdominal pain starting around 5 PM yesterday.  She denies history of previous similar symptoms.  The patient did have associated nausea/vomiting.  No fevers or chills.  Today, the patient reports that her abdominal pain is improving.  She is no longer having nausea.  She is passing some flatus.  Past abdominal surgical history is significant for vaginal hysterectomy with bilateral salpingo-oophorectomy, inguinal hernia repair (?  Left), and left lower quadrant placement of renal transplant.            Physical Exam:   Blood pressure 124/48, pulse 75, temperature 96.8  F (36  C), temperature source Oral, resp. rate 24, height 1.499 m (4' 11\"), weight 51.8 kg (114 lb 3.2 oz), SpO2 95 %, not currently breastfeeding.  114 lbs 3.17 oz  General: Vital signs reviewed, in no apparent distress  Eyes: Anicteric  HENT: Normocephalic, atraumatic, trachea midline   Respiratory: Breathing nonlabored  Cardiovascular: Regular rate and rhythm  GI: Abdomen soft, slightly distended, mildly tender with palpation, most pronounced in the left periumbilical area and in the right lower " quadrant  Musculoskeletal: No gross deformities  Neurologic: Grossly nonfocal exam  Psychiatric: Normal mood, affect and insight  Integumentary: Warm and dry         Past Medical History:     Past Medical History:   Diagnosis Date     Arthritis      Diverticulitis of small intestine with perforation 1/10/2022     Diverticulosis of colon (without mention of hemorrhage)      Hernia, abdominal      Polycystic kidney, unspecified type     Kidney transplant     PONV (postoperative nausea and vomiting)      Rectocele      Unspecified essential hypertension             Past Surgical History:     Past Surgical History:   Procedure Laterality Date     CYSTOSCOPY       HC CORRECT BUNION,SIMPLE       HEMORRHOIDECTOMY       HERNIA REPAIR       TYMPANOPLASTY  5/9/2012    Procedure:TYMPANOPLASTY; Left Tympanoplasty ; Surgeon:BROOKE SANCHEZ; Location: OR     [facility] TRANSPLANTATION OF KIDNEY      left     [facility] VAGINAL HYSTERECTOMY  1996    prolapse uterus, bilat. S&O     [facility] COLONOSCOPY THRU STOMA, DIAGNOSTIC  5-03    few diverticulae            Current Medications:           piperacillin-tazobactam  3.375 g Intravenous Q6H     sodium chloride (PF)  3 mL Intracatheter Q8H       acetaminophen **OR** acetaminophen, HYDROmorphone, HYDROmorphone, lidocaine 4%, lidocaine (buffered or not buffered), melatonin, naloxone **OR** naloxone **OR** naloxone **OR** naloxone, ondansetron **OR** ondansetron, prochlorperazine **OR** prochlorperazine **OR** prochlorperazine, sodium chloride (PF)         Home Medications:     Prior to Admission medications    Medication Sig Last Dose Taking? Auth Provider   aspirin 81 MG EC tablet Take 81 mg by mouth daily  1/9/2022 at AM Yes Reported, Patient   calcium citrate-vitamin D (CITRACAL) 315-200 MG-UNIT TABS per tablet Take 1 tablet by mouth 2 times daily 1/9/2022 at AM Yes Unknown, Entered By History   Cholecalciferol (VITAMIN D) 400 UNIT capsule Take 1 capsule by mouth daily. 1/9/2022 at AM Yes Reported,  Patient   denosumab (PROLIA) 60 MG/ML SOSY injection Inject 60 mg Subcutaneous every 6 months  Yes Unknown, Entered By History   Multiple Vitamin (MULTI-VITAMIN) per tablet Take 1 tablet by mouth daily. 1/9/2022 at AM Yes Reported, Patient   mycophenolate (CELLCEPT-BRAND NAME) 500 MG tablet Take 1,000 mg by mouth 2 times daily. 1/9/2022 at AM Yes Reported, Patient   predniSONE (DELTASONE) 5 MG tablet Take 5 mg by mouth daily 1/9/2022 at AM Yes Unknown, Entered By History            Allergies:     Allergies   Allergen Reactions     Codeine Nausea and Vomiting     Darvocet [Propoxyphene N-Apap] Nausea and Vomiting     Morphine Nausea and Vomiting            Family History:     Family History   Problem Relation Age of Onset     Genitourinary Problems Father         polycystic kidneys in family     Depression Mother      Hypertension Mother      Depression Sister      Depression Sister      Depression Brother      Hypertension Sister            Social History:   Maria E Webster  reports that she has never smoked. She has never used smokeless tobacco. She reports current alcohol use. She reports that she does not use drugs.          Review of Systems:   Constitutional: No fevers or chills  Eyes: No blurred or double vision  HENT: Reports recent headaches, No rhinorrhea, No sore throat  Respiratory: No cough or shortness of breath  Cardiovascular: Denies chest pain or palpitations  Gastrointestinal: Abdominal pain and nausea/vomiting  Genitourinary: No hematuria or dysuria  Musculoskeletal: Denies arthralgias or myalgias  Neurologic: No numbness or tingling  Integumentary: No skin rashes         Labs/Imaging   All new lab and imaging data was reviewed.   Recent Labs   Lab 01/09/22 2049   WBC 6.7   HGB 12.2   HCT 37.9   MCV 93        Recent Labs   Lab 01/09/22 2049   *   POTASSIUM 3.7   CHLORIDE 99   CO2 25   ANIONGAP 6   *   BUN 35*   CR 0.62   GFRESTIMATED 89   BERNARDA 8.6   PROTTOTAL 7.1   ALBUMIN 3.6    BILITOTAL 0.4   ALKPHOS 51   AST 15   ALT 26     Recent Labs   Lab 01/09/22  2335   LACT 1.5       I have personally reviewed the imaging studies-   CT ABDOMEN PELVIS W/O CONTRAST  LOCATION: [facility]  DATE/TIME: 1/9/2022 10:44 PM    FINDINGS:   LOWER CHEST: Small amount of contrast seen within the esophagus, correlate to exclude gastroesophageal reflux.     HEPATOBILIARY: Extensive hepatic cysts, not significantly changed, in combination with known renal cysts consistent with polycystic kidney disease.     PANCREAS: Normal.     SPLEEN: Normal.     ADRENAL GLANDS: Normal.     KIDNEYS/BLADDER: Bilateral enlarged polycystic kidneys consistent with polycystic kidney disease. Some of which appear more hyperdense, likely representing proteinaceous or hemorrhagic cysts. However, a new solid-appearing nodule measuring 2.3 cm is seen   arising from the inferior pole of the right kidney (2, 53), attention to detail on follow-up imaging with contrast.      Left renal transplant kidney. No hydroureteronephrosis.     BOWEL: Short segment loop of mural thickening along the mesenteric border of a loop of jejunum in the left mid abdomen (2, 51). Punctate foci of extramural air (2, 41) possibly free or within the vasculature. Trace free fluid. No obstruction,   appendicitis, diverticulitis, or colitis. Mild diverticulosis.     LYMPH NODES: Normal.     VASCULATURE: No aneurysm. Severe atherosclerotic vascular disease.     PELVIC ORGANS: Absent uterus. Pessary device.     MUSCULOSKELETAL: Multilevel discogenic degenerative change.                                                                      IMPRESSION:   1.  Short segment mural thickening of the jejunum with inflammatory change of the adjacent mesenteric border. Small punctate foci of air within the mesentery or vessel raise concern for ischemia and/or focal perforation. Diagnostic considerations are   concerning for focal enteritis, ischemic,  inflammatory, or infectious, or jejunal diverticulitis. Recommend follow-up to resolution to exclude underlying malignancy or small bowel lymphoma.  2.  Polycystic kidney disease.  3.  Left pelvic renal transplant.  4.  Severe atherosclerotic vascular disease and coronary artery disease.  5.  2.3 cm enlarged soft tissue density focus seen arising from the inferior pole of the right kidney, though this may reflect a hyperdense cyst, recommend attention to detail on follow-up imaging with contrast.     CTA ABDOMEN AND PELVIS WITH CONTRAST  LOCATION: [facility]  DATE/TIME: 1/9/2022 11:57 PM    FINDINGS:  ANGIOGRAM ABDOMEN AND PELVIS: The abdominal aorta is normal in caliber without dissection. Atherosclerotic calcification in the abdominal aorta. No significant stenosis or occlusion of the major branches of the celiac, superior mesenteric and inferior   mesenteric arteries. Severe narrowing of the right renal artery. Probable chronic occlusion of the left renal artery.     LOWER CHEST: Coronary artery calcification.     HEPATOBILIARY: Numerous cysts of varying sizes scattered within the liver, the largest a 6.6 cm cyst in the right lobe.     PANCREAS: Unremarkable.     SPLEEN: Unremarkable.     ADRENAL GLANDS: Unremarkable.     KIDNEYS/BLADDER: Innumerable cysts of varying sizes scattered throughout bilateral native kidneys, the largest in the right kidney and measuring 7 cm. The appearance is consistent with autosomal dominant polycystic kidney disease. There is no enhancement   of the 2.3 cm mildly hyperattenuating lesion in the inferior pole of the right kidney that was described on the recent comparison study and therefore is consistent with a hemorrhagic or proteinaceous cyst. There is moderate to severe atrophy of the   parenchyma of the native kidneys. Left lower quadrant renal transplant.     BOWEL: A few loops of mildly dilated small bowel are present in the anterior aspect of the mid  abdomen. The more distal small bowel and colon are relatively nondistended. A 5 cm region of haziness is present in the mesenteric fat that is closely   associated with jejunum in the mid left hemiabdomen (for example, series 6 image 107). There are several foci of extraluminal gas in the bowel mesentery at this location. There are a few small diverticula in the jejunum in this region. The haziness and   extraluminal gas have increased since the recent comparison study. The appendix is not visualized.     LYMPH NODES: Unremarkable.     PELVIC ORGANS: A pessary is present in the vagina.     MUSCULOSKELETAL: No acute findings.     OTHER: A very small amount of free fluid in the pelvis. Tiny paraumbilical hernia containing fat.                                                                      IMPRESSION:  1.  A 5 cm region of haziness in the mesenteric fat of the mid left hemiabdomen, suggestive of inflammation. There are several foci of extraluminal gas in this region that are consistent with a localized small bowel perforation. The extent of the   haziness and amount of extraluminal gas have mildly increased since the very recent comparison CT scan. There are several small diverticula in the adjacent jejunum and therefore perforated jejunal diverticulitis is the most likely cause of these   findings.  2.  No evidence of occlusion or significant stenosis of the major branches of the celiac, superior mesenteric or inferior mesenteric arteries.  3.  A few loops of mildly dilated small bowel are present in the anterior aspect of the mid abdomen and the more distal bowel is relatively decompressed, new since the comparison study. This is suggestive of a partial small bowel obstruction that could   possibly relate to the aforementioned abnormal jejunal findings.  4.  A very small amount of free fluid in the pelvis.    Yoselyn Olvera MD

## 2022-01-10 NOTE — PLAN OF CARE
Pt pleasant, A&O.  NPO except ice and meds.  Up with SBA/GB to bathroom/chair.  Some abd discomfort, abd rounded.  Surgery following.  Continue with conservative management (antibiotics and bowel rest).  IVFs infusing.  Incontinent/urgency with bladder which is baseline for pt.

## 2022-01-11 LAB
ANION GAP SERPL CALCULATED.3IONS-SCNC: 6 MMOL/L (ref 3–14)
BUN SERPL-MCNC: 16 MG/DL (ref 7–30)
CALCIUM SERPL-MCNC: 8 MG/DL (ref 8.5–10.1)
CHLORIDE BLD-SCNC: 107 MMOL/L (ref 94–109)
CO2 SERPL-SCNC: 24 MMOL/L (ref 20–32)
CREAT SERPL-MCNC: 0.55 MG/DL (ref 0.52–1.04)
ERYTHROCYTE [DISTWIDTH] IN BLOOD BY AUTOMATED COUNT: 14.4 % (ref 10–15)
GFR SERPL CREATININE-BSD FRML MDRD: >90 ML/MIN/1.73M2
GLUCOSE BLD-MCNC: 102 MG/DL (ref 70–99)
HCT VFR BLD AUTO: 36.6 % (ref 35–47)
HGB BLD-MCNC: 11.9 G/DL (ref 11.7–15.7)
MCH RBC QN AUTO: 30.1 PG (ref 26.5–33)
MCHC RBC AUTO-ENTMCNC: 32.5 G/DL (ref 31.5–36.5)
MCV RBC AUTO: 92 FL (ref 78–100)
PLATELET # BLD AUTO: 230 10E3/UL (ref 150–450)
POTASSIUM BLD-SCNC: 3.4 MMOL/L (ref 3.4–5.3)
RBC # BLD AUTO: 3.96 10E6/UL (ref 3.8–5.2)
SODIUM SERPL-SCNC: 137 MMOL/L (ref 133–144)
WBC # BLD AUTO: 8.4 10E3/UL (ref 4–11)

## 2022-01-11 PROCEDURE — 99231 SBSQ HOSP IP/OBS SF/LOW 25: CPT | Performed by: STUDENT IN AN ORGANIZED HEALTH CARE EDUCATION/TRAINING PROGRAM

## 2022-01-11 PROCEDURE — 120N000001 HC R&B MED SURG/OB

## 2022-01-11 PROCEDURE — 36415 COLL VENOUS BLD VENIPUNCTURE: CPT | Performed by: STUDENT IN AN ORGANIZED HEALTH CARE EDUCATION/TRAINING PROGRAM

## 2022-01-11 PROCEDURE — 250N000013 HC RX MED GY IP 250 OP 250 PS 637: Performed by: INTERNAL MEDICINE

## 2022-01-11 PROCEDURE — 99231 SBSQ HOSP IP/OBS SF/LOW 25: CPT | Performed by: SURGERY

## 2022-01-11 PROCEDURE — 250N000013 HC RX MED GY IP 250 OP 250 PS 637: Performed by: STUDENT IN AN ORGANIZED HEALTH CARE EDUCATION/TRAINING PROGRAM

## 2022-01-11 PROCEDURE — 85027 COMPLETE CBC AUTOMATED: CPT | Performed by: STUDENT IN AN ORGANIZED HEALTH CARE EDUCATION/TRAINING PROGRAM

## 2022-01-11 PROCEDURE — 250N000011 HC RX IP 250 OP 636: Performed by: STUDENT IN AN ORGANIZED HEALTH CARE EDUCATION/TRAINING PROGRAM

## 2022-01-11 PROCEDURE — 250N000011 HC RX IP 250 OP 636: Performed by: INTERNAL MEDICINE

## 2022-01-11 PROCEDURE — 250N000012 HC RX MED GY IP 250 OP 636 PS 637: Performed by: STUDENT IN AN ORGANIZED HEALTH CARE EDUCATION/TRAINING PROGRAM

## 2022-01-11 PROCEDURE — 99207 PR CDG-MDM COMPONENT: MEETS LOW - DOWN CODED: CPT | Performed by: STUDENT IN AN ORGANIZED HEALTH CARE EDUCATION/TRAINING PROGRAM

## 2022-01-11 PROCEDURE — 258N000003 HC RX IP 258 OP 636: Performed by: INTERNAL MEDICINE

## 2022-01-11 PROCEDURE — 80048 BASIC METABOLIC PNL TOTAL CA: CPT | Performed by: STUDENT IN AN ORGANIZED HEALTH CARE EDUCATION/TRAINING PROGRAM

## 2022-01-11 RX ADMIN — MYCOPHENOLATE MOFETIL 1000 MG: 500 TABLET, FILM COATED ORAL at 01:01

## 2022-01-11 RX ADMIN — SODIUM CHLORIDE: 9 INJECTION, SOLUTION INTRAVENOUS at 22:59

## 2022-01-11 RX ADMIN — ENOXAPARIN SODIUM 40 MG: 40 INJECTION SUBCUTANEOUS at 12:20

## 2022-01-11 RX ADMIN — PREDNISONE 5 MG: 5 TABLET ORAL at 12:19

## 2022-01-11 RX ADMIN — PIPERACILLIN SODIUM AND TAZOBACTAM SODIUM 3.38 G: 3; .375 INJECTION, POWDER, LYOPHILIZED, FOR SOLUTION INTRAVENOUS at 07:59

## 2022-01-11 RX ADMIN — SODIUM CHLORIDE: 9 INJECTION, SOLUTION INTRAVENOUS at 12:20

## 2022-01-11 RX ADMIN — ASPIRIN 81 MG: 81 TABLET, COATED ORAL at 12:19

## 2022-01-11 RX ADMIN — MYCOPHENOLATE MOFETIL 1000 MG: 500 TABLET, FILM COATED ORAL at 22:59

## 2022-01-11 RX ADMIN — ACETAMINOPHEN 650 MG: 325 TABLET, FILM COATED ORAL at 02:02

## 2022-01-11 RX ADMIN — PIPERACILLIN SODIUM AND TAZOBACTAM SODIUM 3.38 G: 3; .375 INJECTION, POWDER, LYOPHILIZED, FOR SOLUTION INTRAVENOUS at 19:39

## 2022-01-11 RX ADMIN — PIPERACILLIN SODIUM AND TAZOBACTAM SODIUM 3.38 G: 3; .375 INJECTION, POWDER, LYOPHILIZED, FOR SOLUTION INTRAVENOUS at 02:02

## 2022-01-11 RX ADMIN — ACETAMINOPHEN 650 MG: 325 TABLET, FILM COATED ORAL at 17:27

## 2022-01-11 RX ADMIN — PIPERACILLIN SODIUM AND TAZOBACTAM SODIUM 3.38 G: 3; .375 INJECTION, POWDER, LYOPHILIZED, FOR SOLUTION INTRAVENOUS at 14:02

## 2022-01-11 RX ADMIN — SODIUM CHLORIDE: 9 INJECTION, SOLUTION INTRAVENOUS at 00:20

## 2022-01-11 RX ADMIN — MYCOPHENOLATE MOFETIL 1000 MG: 500 TABLET, FILM COATED ORAL at 12:19

## 2022-01-11 ASSESSMENT — ACTIVITIES OF DAILY LIVING (ADL)
ADLS_ACUITY_SCORE: 10
ADLS_ACUITY_SCORE: 8
ADLS_ACUITY_SCORE: 8
ADLS_ACUITY_SCORE: 6
ADLS_ACUITY_SCORE: 8
ADLS_ACUITY_SCORE: 8
ADLS_ACUITY_SCORE: 6
ADLS_ACUITY_SCORE: 8
ADLS_ACUITY_SCORE: 6
ADLS_ACUITY_SCORE: 10
ADLS_ACUITY_SCORE: 8
ADLS_ACUITY_SCORE: 6
ADLS_ACUITY_SCORE: 6
ADLS_ACUITY_SCORE: 8
ADLS_ACUITY_SCORE: 6
ADLS_ACUITY_SCORE: 6
ADLS_ACUITY_SCORE: 8
ADLS_ACUITY_SCORE: 6
ADLS_ACUITY_SCORE: 6

## 2022-01-11 NOTE — PROGRESS NOTES
[facility]    Medicine Progress Note - Hospitalist Service       Date of Admission:  1/9/2022    Assessment & Plan           Maria E Webster is a very pleasant 81 year old female with h/o renal transplant in 2009, CKD stage II, hypertension, dyslipidemia, depression, anxiety, h/o sigmoid diverticulosis who was admitted on 1/9/2022 with perforated jejunal diverticulitis in an immunosuppressed patient.    Perforated Jejunal Diverticulitis  Right lower quadrant abdominal pain of abrupt onset late afternoon on 1/9/21 (Sunday). Several systolic blood pressure measurements in the 80 range that responded to IV fluid bolus. No elevated WBC at this time but is on chronic immunosuppression meds. Does have history of sigmoid diverticulosis. Abdominal CTA with findings consistent with perforated jejunal diverticulitis.  Imaging not convincing for mesenteric artery occlusions or stenoses. Very sensitive to pain medications so she would prefer acetaminophen over opiates.    > continue conservative care with IV antibiotics and pain medications  > surgery following and no indication for surgery  > follow diet tolerance today, continue IVF until tolerating a diet  > labs pending yet today     Post living related donor kidney transplant 2009  CKD stage II  Polycystic kidney disease  On chronic immunosuppression with prednisone and CellCept.  Imaging did not show any hydronephrosis of the left lower quadrant transplanted kidney. Renal function at baseline GFR of 90.     >Resume prior to admission prednisone and CellCept  >Monitor renal function, labs pending    Hypertension  Dyslipidemia  Managed PTA with aspirin, restart    Ruled Out COVID-19 infection  This patient was evaluated during a global COVID-19 pandemic, which necessitated consideration that the patient might be at risk for infection with the SARS-CoV-2 virus that causes COVID-19. Applicable protocols for evaluation were followed during the  patient's care. Low suspicion for infection. Vaccination status: Fully vaccinated x 3. Pfizer. Most recent dose 8/26/21.   -follow-up COVID-19 PCR test result => negative         Diet: Clear Liquid Diet    DVT Prophylaxis: start lovenox  Jenkins Catheter: Not present  Central Lines: None  Code Status: Full Code      Disposition Plan   Expected Discharge: 1-2 days    Discussed with patient and her nurse.    Margaret Ybarra DO  Hospitalist Service  [facility]  Securely message with the Vocera Web Console (learn more here)  Text page via [employer] Paging/Directory        Clinically Significant Risk Factors Present on Admission                  ______________________________________________________________________    Interval History   Patient resting in bed. She reports feeling a bit better today. Less pressure and distention. Normal BM since yesterday. No fever or chills. She tried some broth this morning and no nausea.    Data reviewed today: I reviewed all medications, new labs and imaging results over the last 24 hours. I personally reviewed CT with contained perf and renal transplant    Physical Exam   Vital Signs: Temp: 98.1  F (36.7  C) Temp src: Oral BP: 126/49 Pulse: 75   Resp: 16 SpO2: 94 % O2 Device: None (Room air)    Weight: 114 lbs 3.17 oz     Constitutional: Awake, alert, cooperative, no apparent distress  Respiratory: Clear to auscultation bilaterally, no crackles or wheezing  Cardiovascular: Regular rate and rhythm, normal S1 and S2, and no murmur noted  GI: Normal bowel sounds, generalized TTP with mild guarding on deep palpation, improved  Skin/Integumen: No rashes, no cyanosis, no edema  MSK: no joint swelling    Data   Recent Labs   Lab 01/09/22  2049   WBC 6.7   HGB 12.2   MCV 93      *   POTASSIUM 3.7   CHLORIDE 99   CO2 25   BUN 35*   CR 0.62   ANIONGAP 6   BERNARDA 8.6   *   ALBUMIN 3.6   PROTTOTAL 7.1   BILITOTAL 0.4   ALKPHOS 51   ALT 26   AST 15   LIPASE  243     No results found for this or any previous visit (from the past 24 hour(s)).  Medications     sodium chloride 100 mL/hr at 01/11/22 0020       aspirin  81 mg Oral Daily     calcium citrate-vitamin D  1 tablet Oral BID     mycophenolate  1,000 mg Oral BID     piperacillin-tazobactam  3.375 g Intravenous Q6H     predniSONE  5 mg Oral Daily     sodium chloride (PF)  3 mL Intracatheter Q8H     Vitamin D3  25 mcg Oral Daily

## 2022-01-11 NOTE — PLAN OF CARE
A&Ox4.  NPO except ice and meds.  Up with SBA/GB to bathroom.  Some abd discomfort, passing gas.  Surgery following.  Continue with management (antibiotics and bowel rest).  IVFs infusing Normal saline at 100 mL/hr.  Incontinent/urgency with bladder which is baseline for pt. Discharge pending from surgery and Abx  treatment.

## 2022-01-11 NOTE — PLAN OF CARE
Pt doing well today.  More comfortable.  Advanced to clear liquids this am, tolerating so far.  Had BM this shift.  Continues on IVFs and intermittent zosyn.  Started on lovenox today.  Up with just SBA, using walker for stability.  Walked in pina a few times, up in chair as well.  PT marcy ordered.

## 2022-01-11 NOTE — PROGRESS NOTES
[facility]    General Surgery  Daily Note       Assessment and Plan:   Maria E Webster is a 81 year old female with contained perforation of jejunal diverticulum     - Medical management per primary team  - Ok to trial clear liquids   - Pain control prn  - IV antibiotics  - No indication for emergent surgical intervention  - Surgery to follow        Interval History:   Patient doing well.  Abdominal pain continuing to improve.  Denies nausea.  Feeling better after having a bowel movement.           Physical Exam:   Temp: 98.1  F (36.7  C) Temp src: Oral BP: 126/49 Pulse: 75   Resp: 16 SpO2: 94 % O2 Device: None (Room air)      I/O last 3 completed shifts:  In: 1500 [I.V.:1500]  Out: -       Constitutional: healthy, alert and no distress   Respiratory: Breathing nonlabored  Abdomen: Soft, nondistended, mildly tender with palpation over central abdomen      Data   Recent Labs   Lab 01/09/22  2049   WBC 6.7   HGB 12.2   MCV 93      *   POTASSIUM 3.7   CHLORIDE 99   CO2 25   BUN 35*   CR 0.62   ANIONGAP 6   BERNARDA 8.6   *   ALBUMIN 3.6   PROTTOTAL 7.1   BILITOTAL 0.4   ALKPHOS 51   ALT 26   AST 15       Yoselyn Olvera MD

## 2022-01-12 ENCOUNTER — APPOINTMENT (OUTPATIENT)
Dept: PHYSICAL THERAPY | Facility: CLINIC | Age: 82
DRG: 392 | End: 2022-01-12
Attending: STUDENT IN AN ORGANIZED HEALTH CARE EDUCATION/TRAINING PROGRAM
Payer: MEDICARE

## 2022-01-12 PROCEDURE — 99232 SBSQ HOSP IP/OBS MODERATE 35: CPT | Performed by: STUDENT IN AN ORGANIZED HEALTH CARE EDUCATION/TRAINING PROGRAM

## 2022-01-12 PROCEDURE — 97161 PT EVAL LOW COMPLEX 20 MIN: CPT | Mod: GP

## 2022-01-12 PROCEDURE — 250N000011 HC RX IP 250 OP 636: Performed by: STUDENT IN AN ORGANIZED HEALTH CARE EDUCATION/TRAINING PROGRAM

## 2022-01-12 PROCEDURE — 250N000013 HC RX MED GY IP 250 OP 250 PS 637: Performed by: STUDENT IN AN ORGANIZED HEALTH CARE EDUCATION/TRAINING PROGRAM

## 2022-01-12 PROCEDURE — 120N000001 HC R&B MED SURG/OB

## 2022-01-12 PROCEDURE — 250N000011 HC RX IP 250 OP 636: Performed by: INTERNAL MEDICINE

## 2022-01-12 PROCEDURE — 250N000012 HC RX MED GY IP 250 OP 636 PS 637: Performed by: STUDENT IN AN ORGANIZED HEALTH CARE EDUCATION/TRAINING PROGRAM

## 2022-01-12 PROCEDURE — 250N000013 HC RX MED GY IP 250 OP 250 PS 637: Performed by: INTERNAL MEDICINE

## 2022-01-12 PROCEDURE — 99231 SBSQ HOSP IP/OBS SF/LOW 25: CPT | Performed by: SURGERY

## 2022-01-12 PROCEDURE — 258N000003 HC RX IP 258 OP 636: Performed by: INTERNAL MEDICINE

## 2022-01-12 RX ADMIN — Medication 25 MCG: at 11:48

## 2022-01-12 RX ADMIN — SIMETHICONE 40 MG: 20 EMULSION ORAL at 16:48

## 2022-01-12 RX ADMIN — PIPERACILLIN SODIUM AND TAZOBACTAM SODIUM 3.38 G: 3; .375 INJECTION, POWDER, LYOPHILIZED, FOR SOLUTION INTRAVENOUS at 02:12

## 2022-01-12 RX ADMIN — SODIUM CHLORIDE: 9 INJECTION, SOLUTION INTRAVENOUS at 09:40

## 2022-01-12 RX ADMIN — MYCOPHENOLATE MOFETIL 1000 MG: 500 TABLET, FILM COATED ORAL at 09:42

## 2022-01-12 RX ADMIN — ONDANSETRON 4 MG: 4 TABLET, ORALLY DISINTEGRATING ORAL at 20:22

## 2022-01-12 RX ADMIN — PIPERACILLIN SODIUM AND TAZOBACTAM SODIUM 3.38 G: 3; .375 INJECTION, POWDER, LYOPHILIZED, FOR SOLUTION INTRAVENOUS at 09:41

## 2022-01-12 RX ADMIN — Medication 1 TABLET: at 11:47

## 2022-01-12 RX ADMIN — ENOXAPARIN SODIUM 40 MG: 40 INJECTION SUBCUTANEOUS at 11:49

## 2022-01-12 RX ADMIN — PIPERACILLIN SODIUM AND TAZOBACTAM SODIUM 3.38 G: 3; .375 INJECTION, POWDER, LYOPHILIZED, FOR SOLUTION INTRAVENOUS at 16:40

## 2022-01-12 RX ADMIN — PREDNISONE 5 MG: 5 TABLET ORAL at 09:42

## 2022-01-12 RX ADMIN — ACETAMINOPHEN 650 MG: 325 TABLET, FILM COATED ORAL at 20:19

## 2022-01-12 RX ADMIN — ACETAMINOPHEN 650 MG: 325 TABLET, FILM COATED ORAL at 09:52

## 2022-01-12 RX ADMIN — MYCOPHENOLATE MOFETIL 1000 MG: 500 TABLET, FILM COATED ORAL at 21:13

## 2022-01-12 RX ADMIN — PIPERACILLIN SODIUM AND TAZOBACTAM SODIUM 3.38 G: 3; .375 INJECTION, POWDER, LYOPHILIZED, FOR SOLUTION INTRAVENOUS at 22:16

## 2022-01-12 ASSESSMENT — ACTIVITIES OF DAILY LIVING (ADL)
ADLS_ACUITY_SCORE: 6

## 2022-01-12 NOTE — CONSULTS
Care Management Initial Consult    General Information  Assessment completed with: Patient ( Van at the bedside),    Type of CM/SW Visit: CM Role Introduction    Primary Care Provider verified and updated as needed: Yes   Readmission within the last 30 days:        Reason for Consult: discharge planning  Advance Care Planning:            Communication Assessment  Patient's communication style: spoken language (English or Bilingual)    Hearing Difficulty or Deaf: no   Wear Glasses or Blind: yes    Cognitive  Cognitive/Neuro/Behavioral: WDL                      Living Environment:   People in home: spouse  Van  Current living Arrangements: house      Able to return to prior arrangements: yes       Family/Social Support:  Care provided by: self  Provides care for: no one  Marital Status:     Terry       Description of Support System: Supportive,Involved    Support Assessment: Adequate family and caregiver support,Adequate social supports    Current Resources:   Patient receiving home care services: No (declines the need for homecare)     Community Resources:    Equipment currently used at home: none  Supplies currently used at home:      Employment/Financial:  Employment Status: retired        Financial Concerns: No concerns identified           Lifestyle & Psychosocial Needs:  Social Determinants of Health     Tobacco Use: Low Risk      Smoking Tobacco Use: Never Smoker     Smokeless Tobacco Use: Never Used   Alcohol Use: Not on file   Financial Resource Strain: Not on file   Food Insecurity: Not on file   Transportation Needs: Not on file   Physical Activity: Not on file   Stress: Not on file   Social Connections: Not on file   Intimate Partner Violence: Not on file   Depression: Not on file   Housing Stability: Not on file       Functional Status:  Prior to admission patient needed assistance:   independent                  Additional Information:    Writer met with the patient and spouse at the  bedside. Patient is A+Ox4 SBA ambulating in the pina.  Patient is a possible discharge in the next day or two pending advancement of diet. Writer went over role and scope of safe discharge planning.  Patient and spouse more concerned about the fact that they do not want to discharge too early and then have to come back into the ED where they would need to wait for hours.    Writer went over orders for current diet patient recently advanced to low fiber diet and is going to order a few things for dinner and see how it goes.  Patient states that she is currently getting PT in the outpatient setting and is hopeful to return to outpatient in the next week or so. She declines the need for homecare at discharge as she plans on walking as tolerated at home with her spouse.  Patient declines the need for any follow up to be scheduled.  Writer let patient and spouse know that if they have additional questions or identify any needs for discharge we will be available to assist.     Marie Rosas RN

## 2022-01-12 NOTE — PROGRESS NOTES
[facility]    Medicine Progress Note - Hospitalist Service       Date of Admission:  1/9/2022    Assessment & Plan           Maria E Webster is a very pleasant 81 year old female with h/o renal transplant in 2009, CKD stage II, hypertension, dyslipidemia, depression, anxiety, h/o sigmoid diverticulosis who was admitted on 1/9/2022 with perforated jejunal diverticulitis in an immunosuppressed patient.    Perforated Jejunal Diverticulitis  Right lower quadrant abdominal pain of abrupt onset late afternoon on 1/9/21 (Sunday). Several systolic blood pressure measurements in the 80 range that responded to IV fluid bolus. No elevated WBC at this time but is on chronic immunosuppression meds. Does have history of sigmoid diverticulosis. Abdominal CTA with findings consistent with perforated jejunal diverticulitis.  Imaging not convincing for mesenteric artery occlusions or stenoses. Very sensitive to pain medications so she would prefer acetaminophen over opiates.    > patient did not have a good evening and reports she did not tolerate diet advancement due to diarrhea, this issues appear more anxiety than true intolerance  > encouraged patient to talk with surgery about her concerns and encouraged her to try to eat/drink more today  > continue IV antibiotics while in the hospital   > need to continue to follow diet tolerance today, continue IVF until tolerating a diet     Post living related donor kidney transplant 2009  CKD stage II  Polycystic kidney disease  On chronic immunosuppression with prednisone and CellCept.  Imaging did not show any hydronephrosis of the left lower quadrant transplanted kidney. Renal function at baseline GFR of 90.     >Resume prior to admission prednisone and CellCept  >Renal function stable    Hypertension  Dyslipidemia  Managed PTA with aspirin, restart    Ruled Out COVID-19 infection  This patient was evaluated during a global COVID-19 pandemic, which necessitated  consideration that the patient might be at risk for infection with the SARS-CoV-2 virus that causes COVID-19. Applicable protocols for evaluation were followed during the patient's care. Low suspicion for infection. Vaccination status: Fully vaccinated x 3. Pfizer. Most recent dose 8/26/21.   -follow-up COVID-19 PCR test result => negative         Diet: Full Liquid Diet    DVT Prophylaxis: start lovenox  Jenkins Catheter: Not present  Central Lines: None  Code Status: Full Code      Disposition Plan   Expected Discharge:Possibly tomorrow if able to tolerate a diet.    Discussed with patient and her nurse.    Margaret Ybarra DO  Hospitalist Service  [facility]  Securely message with the Vocera Web Console (learn more here)  Text page via [email or web address] Paging/Directory        Clinically Significant Risk Factors Present on Admission                  ______________________________________________________________________    Interval History   Patient tells me she is very weak. She had diarrhea last evening and felt that she wasn't tolerating any food so she went back to ice chips. No increase in pain. No nausea. No blood just one episode of diarrhea. She denies chest pain or issues breathing. She is very anxious about discussion regarding trying to eat more and getting home eventually.    Data reviewed today: I reviewed all medications, new labs and imaging results over the last 24 hours. I personally reviewed CT with contained perf and renal transplant    Physical Exam   Vital Signs: Temp: 97.7  F (36.5  C) Temp src: Oral BP: (!) 157/54 Pulse: 74   Resp: 24 SpO2: 97 % O2 Device: None (Room air)    Weight: 114 lbs 3.17 oz     Constitutional: Awake, alert, cooperative, no apparent distress  Respiratory: Clear to auscultation bilaterally, no crackles or wheezing  Cardiovascular: Regular rate and rhythm, normal S1 and S2, and no murmur noted  GI: Normal bowel sounds, no major tenderness  Skin/Integumen: No  rashes, no cyanosis, no edema  MSK: no joint swelling    Data   Recent Labs   Lab 01/11/22  1125 01/09/22  2049   WBC 8.4 6.7   HGB 11.9 12.2   MCV 92 93    276    130*   POTASSIUM 3.4 3.7   CHLORIDE 107 99   CO2 24 25   BUN 16 35*   CR 0.55 0.62   ANIONGAP 6 6   BERNARDA 8.0* 8.6   * 142*   ALBUMIN  --  3.6   PROTTOTAL  --  7.1   BILITOTAL  --  0.4   ALKPHOS  --  51   ALT  --  26   AST  --  15   LIPASE  --  243     No results found for this or any previous visit (from the past 24 hour(s)).  Medications     sodium chloride 100 mL/hr at 01/12/22 0940       aspirin  81 mg Oral Daily     calcium citrate-vitamin D  1 tablet Oral BID     enoxaparin ANTICOAGULANT  40 mg Subcutaneous Q24H     mycophenolate  1,000 mg Oral BID     piperacillin-tazobactam  3.375 g Intravenous Q6H     predniSONE  5 mg Oral Daily     sodium chloride (PF)  3 mL Intracatheter Q8H     Vitamin D3  25 mcg Oral Daily

## 2022-01-12 NOTE — PLAN OF CARE
A&Ox4.  VSS except HTN, RA.  Denies pain.  Tolerating small amounts of clear liquids; mainly ice chips and water this shift.  Voiding adequately; one BM this shift.  SBA with walker.  NS @ 100 mL/hr; intermittent antibiotics.  Discharge pending progress.

## 2022-01-12 NOTE — PLAN OF CARE
DATE & TIME: 1/12/22 @ 2455-0129  SUMMARY: Diverticulitis of jejunum w/ perf   Cognitive Concerns/ Orientation : Aox4   BEHAVIOR & AGGRESSION TOOL COLOR: Green  CIWA SCORE: NA  ABNL VS/O2: VSS on RA  MOBILITY: SBA w/ GB and walker  PAIN MANAGMENT: Pain c/o w/ tylenol x1  DIET: full liquid/low fiber  BOWEL/BLADDER: Continent. Voiding adequately, + flatus, + BS, + BM (small)  ABNL LAB/BG: NA  DRAIN/DEVICES: PIV infusing NS@ 100/hr  TELEMETRY RHYTHM: NA  SKIN: Bruised otherwise intact  TESTS/PROCEDURES: NA  D/C DAY/GOALS/PLACE: Plan to discharge home tomorrow pending progress w/ advancement of diet

## 2022-01-12 NOTE — PROGRESS NOTES
[facility]    General Surgery  Daily Note       Assessment and Plan:   Maria E Webster is a 81 year old female with contained perforation of jejunal diverticulum     - Medical management per primary team  - Ok to advance to full liquid diet today  - Pain control prn  - IV antibiotics  - No indication for emergent surgical intervention  - Surgery to follow        Interval History:   Patient remains stable.  Afebrile.  Reports she is tired this a.m. due to multiple loose stools.  She is otherwise tolerating oral intake.  No nausea.  Abdominal pain remains stable.           Physical Exam:   Temp: 97.7  F (36.5  C) Temp src: Oral BP: (!) 157/54 Pulse: 74   Resp: 24 SpO2: 97 % O2 Device: None (Room air)      I/O last 3 completed shifts:  In: 2960 [P.O.:460; I.V.:2500]  Out: -       Constitutional: healthy, alert and no distress   Respiratory: Breathing nonlabored  Abdomen: Soft, nondistended, mildly tender with palpation over central abdomen      Data   Recent Labs   Lab 01/11/22  1125 01/09/22  2049   WBC 8.4 6.7   HGB 11.9 12.2   MCV 92 93    276    130*   POTASSIUM 3.4 3.7   CHLORIDE 107 99   CO2 24 25   BUN 16 35*   CR 0.55 0.62   ANIONGAP 6 6   BERNARDA 8.0* 8.6   * 142*   ALBUMIN  --  3.6   PROTTOTAL  --  7.1   BILITOTAL  --  0.4   ALKPHOS  --  51   ALT  --  26   AST  --  15       Yoselyn Olvera MD

## 2022-01-12 NOTE — PROGRESS NOTES
01/12/22 1023   Quick Adds   Type of Visit Initial PT Evaluation   Living Environment   People in home spouse   Current Living Arrangements house   Home Accessibility stairs to enter home   Number of Stairs, Main Entrance 2   Stair Railings, Main Entrance   (1 railing)   Transportation Anticipated family or friend will provide   Living Environment Comments lives with spouse, typically drives   Self-Care   Usual Activity Tolerance good   Current Activity Tolerance moderate   Regular Exercise Yes   Activity/Exercise Type walking   Exercise Amount/Frequency 30 mins;daily   Equipment Currently Used at Home none   Activity/Exercise/Self-Care Comment pt reports has a walker at home however does not use, pt reports currently going to outpt PT working on balance, stretching and strengthening 1x/week   Disability/Function   Fall history within last six months no   Change in Functional Status Since Onset of Current Illness/Injury yes   General Information   Onset of Illness/Injury or Date of Surgery 01/09/22   Referring Physician Margaret Ybarra, DO   Patient/Family Therapy Goals Statement (PT) to return home and continue with outpt PT services   Pertinent History of Current Problem (include personal factors and/or comorbidities that impact the POC) 81 year old female with h/o renal transplant in 2009, CKD stage II, hypertension, dyslipidemia, depression, anxiety, h/o sigmoid diverticulosis who was admitted on 1/9/2022 with perforated jejunal diverticulitis   Existing Precautions/Restrictions fall   General Observations sitting up in chair, NAD   Cognition   Orientation Status (Cognition) oriented to;person;place;situation   Cognitive Status Comments pt verbalizes good understanding of her mobility limitations   Pain Assessment   Patient Currently in Pain No   Range of Motion (ROM)   ROM Comment WFL for general mobility   Strength   Strength Comments generalized weakness, able to lift ext x 4 against gravity   Bed  Mobility   Comment (Bed Mobility) NT-pt sitting up in chair   Transfers   Transfer Safety Comments IND sit to stand    Gait/Stairs (Locomotion)   [place] Level (Gait) supervision;contact guard   Distance in Feet (Required for LE Total Joints) 300ft   Pattern (Gait) step-through   Deviations/Abnormal Patterns (Gait) gait speed decreased;bilateral deviations  (decreased bilateral arm swing)   Comment (Gait/Stairs) pt with LOB x 2 required min A for safety to regain balance with stepping rxn, tolerated distance without complaints. Pt verbalized awareness to decreased balance without FWW support   Balance   Balance Comments impaired dynamic standing balance.    Sensory Examination   Sensory Perception Comments pt denies numbness/tingling   Clinical Impression   Criteria for Skilled Therapeutic Intervention evaluation only   PT Diagnosis (PT) impaired gait   Influenced by the following impairments impaired balance, generalized weakness   Functional limitations due to impairments impaired safety with mobility without AD   Clinical Presentation Stable/Uncomplicated   Clinical Presentation Rationale clinical judgement    Clinical Decision Making (Complexity) low complexity   Therapy Frequency (PT) Evaluation only   Risk & Benefits of therapy have been explained evaluation/treatment results reviewed;risks/benefits reviewed;care plan/treatment goals reviewed;current/potential barriers reviewed;participants included;participants voiced agreement with care plan;patient   PT Discharge Planning    PT Discharge Recommendation (DC Rec) home with assist;home with outpatient physical therapy   PT Rationale for DC Rec Pt demos slight decline with her balance and strength from baseline (typically IND with amb 30 min/day without AD). Discussed with pt, recommend use of FWW at discharge to assist with balance and safety upon return home. Recommend resuming outpt PT to progress pt's activity and safety with ambulation. Pt reports her  spouse can assist her at discharge as needed. Pt in agreement with recommendations and was able to verbalize good safety awareness during encounter. No further IP needs identified and pt in agreement.    PT Brief overview of current status  recommend pt continue to amb with nursing staff in halls 3-4x/day    Total Evaluation Time   Total Evaluation Time (Minutes) 13

## 2022-01-13 LAB
ANION GAP SERPL CALCULATED.3IONS-SCNC: 7 MMOL/L (ref 3–14)
BASOPHILS # BLD AUTO: 0 10E3/UL (ref 0–0.2)
BASOPHILS NFR BLD AUTO: 0 %
BUN SERPL-MCNC: 12 MG/DL (ref 7–30)
CALCIUM SERPL-MCNC: 8 MG/DL (ref 8.5–10.1)
CHLORIDE BLD-SCNC: 105 MMOL/L (ref 94–109)
CO2 SERPL-SCNC: 22 MMOL/L (ref 20–32)
CREAT SERPL-MCNC: 0.58 MG/DL (ref 0.52–1.04)
EOSINOPHIL # BLD AUTO: 0 10E3/UL (ref 0–0.7)
EOSINOPHIL NFR BLD AUTO: 0 %
ERYTHROCYTE [DISTWIDTH] IN BLOOD BY AUTOMATED COUNT: 13.7 % (ref 10–15)
GFR SERPL CREATININE-BSD FRML MDRD: 90 ML/MIN/1.73M2
GLUCOSE BLD-MCNC: 80 MG/DL (ref 70–99)
HCT VFR BLD AUTO: 37.4 % (ref 35–47)
HGB BLD-MCNC: 11.9 G/DL (ref 11.7–15.7)
IMM GRANULOCYTES # BLD: 0.1 10E3/UL
IMM GRANULOCYTES NFR BLD: 1 %
LYMPHOCYTES # BLD AUTO: 0.3 10E3/UL (ref 0.8–5.3)
LYMPHOCYTES NFR BLD AUTO: 3 %
MCH RBC QN AUTO: 29.4 PG (ref 26.5–33)
MCHC RBC AUTO-ENTMCNC: 31.8 G/DL (ref 31.5–36.5)
MCV RBC AUTO: 92 FL (ref 78–100)
MONOCYTES # BLD AUTO: 0.4 10E3/UL (ref 0–1.3)
MONOCYTES NFR BLD AUTO: 4 %
NEUTROPHILS # BLD AUTO: 7.6 10E3/UL (ref 1.6–8.3)
NEUTROPHILS NFR BLD AUTO: 92 %
NRBC # BLD AUTO: 0 10E3/UL
NRBC BLD AUTO-RTO: 0 /100
PLATELET # BLD AUTO: 256 10E3/UL (ref 150–450)
POTASSIUM BLD-SCNC: 3.5 MMOL/L (ref 3.4–5.3)
RBC # BLD AUTO: 4.05 10E6/UL (ref 3.8–5.2)
SODIUM SERPL-SCNC: 134 MMOL/L (ref 133–144)
WBC # BLD AUTO: 8.3 10E3/UL (ref 4–11)

## 2022-01-13 PROCEDURE — 250N000012 HC RX MED GY IP 250 OP 636 PS 637: Performed by: STUDENT IN AN ORGANIZED HEALTH CARE EDUCATION/TRAINING PROGRAM

## 2022-01-13 PROCEDURE — 85025 COMPLETE CBC W/AUTO DIFF WBC: CPT | Performed by: STUDENT IN AN ORGANIZED HEALTH CARE EDUCATION/TRAINING PROGRAM

## 2022-01-13 PROCEDURE — 36415 COLL VENOUS BLD VENIPUNCTURE: CPT | Performed by: STUDENT IN AN ORGANIZED HEALTH CARE EDUCATION/TRAINING PROGRAM

## 2022-01-13 PROCEDURE — 99231 SBSQ HOSP IP/OBS SF/LOW 25: CPT | Performed by: SURGERY

## 2022-01-13 PROCEDURE — 250N000011 HC RX IP 250 OP 636: Performed by: INTERNAL MEDICINE

## 2022-01-13 PROCEDURE — 99232 SBSQ HOSP IP/OBS MODERATE 35: CPT | Performed by: STUDENT IN AN ORGANIZED HEALTH CARE EDUCATION/TRAINING PROGRAM

## 2022-01-13 PROCEDURE — 250N000013 HC RX MED GY IP 250 OP 250 PS 637: Performed by: STUDENT IN AN ORGANIZED HEALTH CARE EDUCATION/TRAINING PROGRAM

## 2022-01-13 PROCEDURE — 250N000013 HC RX MED GY IP 250 OP 250 PS 637: Performed by: INTERNAL MEDICINE

## 2022-01-13 PROCEDURE — 120N000001 HC R&B MED SURG/OB

## 2022-01-13 PROCEDURE — 250N000011 HC RX IP 250 OP 636: Performed by: STUDENT IN AN ORGANIZED HEALTH CARE EDUCATION/TRAINING PROGRAM

## 2022-01-13 PROCEDURE — 82310 ASSAY OF CALCIUM: CPT | Performed by: STUDENT IN AN ORGANIZED HEALTH CARE EDUCATION/TRAINING PROGRAM

## 2022-01-13 PROCEDURE — 258N000003 HC RX IP 258 OP 636: Performed by: INTERNAL MEDICINE

## 2022-01-13 RX ORDER — LACTOBACILLUS RHAMNOSUS GG 10B CELL
2 CAPSULE ORAL 2 TIMES DAILY
Status: DISCONTINUED | OUTPATIENT
Start: 2022-01-13 | End: 2022-01-14 | Stop reason: HOSPADM

## 2022-01-13 RX ORDER — SIMETHICONE 40MG/0.6ML
40 SUSPENSION, DROPS(FINAL DOSAGE FORM)(ML) ORAL 4 TIMES DAILY
Status: DISCONTINUED | OUTPATIENT
Start: 2022-01-13 | End: 2022-01-13

## 2022-01-13 RX ORDER — SIMETHICONE 40MG/0.6ML
40 SUSPENSION, DROPS(FINAL DOSAGE FORM)(ML) ORAL 4 TIMES DAILY
Status: DISCONTINUED | OUTPATIENT
Start: 2022-01-13 | End: 2022-01-14 | Stop reason: HOSPADM

## 2022-01-13 RX ADMIN — PIPERACILLIN SODIUM AND TAZOBACTAM SODIUM 3.38 G: 3; .375 INJECTION, POWDER, LYOPHILIZED, FOR SOLUTION INTRAVENOUS at 04:44

## 2022-01-13 RX ADMIN — Medication 25 MCG: at 09:49

## 2022-01-13 RX ADMIN — ENOXAPARIN SODIUM 40 MG: 40 INJECTION SUBCUTANEOUS at 12:38

## 2022-01-13 RX ADMIN — MYCOPHENOLATE MOFETIL 1000 MG: 500 TABLET, FILM COATED ORAL at 20:58

## 2022-01-13 RX ADMIN — PIPERACILLIN SODIUM AND TAZOBACTAM SODIUM 3.38 G: 3; .375 INJECTION, POWDER, LYOPHILIZED, FOR SOLUTION INTRAVENOUS at 22:51

## 2022-01-13 RX ADMIN — SODIUM CHLORIDE: 9 INJECTION, SOLUTION INTRAVENOUS at 02:05

## 2022-01-13 RX ADMIN — SIMETHICONE 40 MG: 20 EMULSION ORAL at 12:38

## 2022-01-13 RX ADMIN — PIPERACILLIN SODIUM AND TAZOBACTAM SODIUM 3.38 G: 3; .375 INJECTION, POWDER, LYOPHILIZED, FOR SOLUTION INTRAVENOUS at 16:41

## 2022-01-13 RX ADMIN — Medication 2 CAPSULE: at 14:07

## 2022-01-13 RX ADMIN — MYCOPHENOLATE MOFETIL 1000 MG: 500 TABLET, FILM COATED ORAL at 09:50

## 2022-01-13 RX ADMIN — PIPERACILLIN SODIUM AND TAZOBACTAM SODIUM 3.38 G: 3; .375 INJECTION, POWDER, LYOPHILIZED, FOR SOLUTION INTRAVENOUS at 09:49

## 2022-01-13 RX ADMIN — PREDNISONE 5 MG: 5 TABLET ORAL at 09:49

## 2022-01-13 RX ADMIN — SIMETHICONE 40 MG: 20 EMULSION ORAL at 17:14

## 2022-01-13 RX ADMIN — ACETAMINOPHEN 650 MG: 325 TABLET, FILM COATED ORAL at 09:50

## 2022-01-13 RX ADMIN — SIMETHICONE 40 MG: 20 EMULSION ORAL at 21:01

## 2022-01-13 RX ADMIN — ASPIRIN 81 MG: 81 TABLET, COATED ORAL at 09:49

## 2022-01-13 RX ADMIN — Medication 2 CAPSULE: at 20:58

## 2022-01-13 ASSESSMENT — ACTIVITIES OF DAILY LIVING (ADL)
ADLS_ACUITY_SCORE: 7
ADLS_ACUITY_SCORE: 6
ADLS_ACUITY_SCORE: 7
ADLS_ACUITY_SCORE: 6
ADLS_ACUITY_SCORE: 7
ADLS_ACUITY_SCORE: 7
ADLS_ACUITY_SCORE: 6
ADLS_ACUITY_SCORE: 7
ADLS_ACUITY_SCORE: 7
ADLS_ACUITY_SCORE: 6
ADLS_ACUITY_SCORE: 7
ADLS_ACUITY_SCORE: 6
ADLS_ACUITY_SCORE: 7
ADLS_ACUITY_SCORE: 6
ADLS_ACUITY_SCORE: 6

## 2022-01-13 NOTE — PROGRESS NOTES
[facility]    Medicine Progress Note - Hospitalist Service       Date of Admission:  1/9/2022    Assessment & Plan           Maria E Webster is a very pleasant 81 year old female with h/o renal transplant in 2009, CKD stage II, hypertension, dyslipidemia, depression, anxiety, h/o sigmoid diverticulosis who was admitted on 1/9/2022 with perforated jejunal diverticulitis in an immunosuppressed patient.    Perforated Jejunal Diverticulitis  Right lower quadrant abdominal pain of abrupt onset late afternoon on 1/9/21 (Sunday). Several systolic blood pressure measurements in the 80 range that responded to IV fluid bolus. No elevated WBC at this time but is on chronic immunosuppression meds. Does have history of sigmoid diverticulosis. Abdominal CTA with findings consistent with perforated jejunal diverticulitis.  Imaging not convincing for mesenteric artery occlusions or stenoses. Very sensitive to pain medications so she would prefer acetaminophen over opiates.    > patient with significant gas and bloating likely due to antibiotics and diet advancement, she does not feel comfortable going home.   > will schedule simethicone and start probiotic. Educated patient that these symptoms are not her perforation but issues related to diet tolerance  > continue IV antibiotics while in the hospital and transition or oral on discharge    Post living related donor kidney transplant 2009  CKD stage II  Polycystic kidney disease  On chronic immunosuppression with prednisone and CellCept.  Imaging did not show any hydronephrosis of the left lower quadrant transplanted kidney. Renal function at baseline GFR of 90.     >Resume prior to admission prednisone and CellCept  >Renal function stable    Hypertension  Dyslipidemia  Managed PTA with aspirin, restart    Ruled Out COVID-19 infection  This patient was evaluated during a global COVID-19 pandemic, which necessitated consideration that the patient might be at  risk for infection with the SARS-CoV-2 virus that causes COVID-19. Applicable protocols for evaluation were followed during the patient's care. Low suspicion for infection. Vaccination status: Fully vaccinated x 3. Pfizer. Most recent dose 8/26/21.   -follow-up COVID-19 PCR test result => negative         Diet: Clear Liquid Diet    DVT Prophylaxis: start lovenox  Jenkins Catheter: Not present  Central Lines: None  Code Status: Full Code      Disposition Plan   Expected Discharge: Possibly tomorrow if able to tolerate a diet.    Discussed with patient and her nurse and her  at bedside.    Margaret Ybarra DO  Hospitalist Service  [facility]  Securely message with the Vocera Web Console (learn more here)  Text page via [employer] Paging/Directory        Clinically Significant Risk Factors Present on Admission                  ______________________________________________________________________    Interval History   Patient overall is doing well. She did eat some yesterday. No diarrhea. No fever. Mild nausea but nothing compared to what she came in with. She has significant bloating and gas and reports it is keeping her up all night and she is nervous about leaving the hospital. She reports these symptoms are nothing like what brought her to the hospital and education was provided to her and her  on differences.     Data reviewed today: I reviewed all medications, new labs and imaging results over the last 24 hours.    Physical Exam   Vital Signs: Temp: 98.1  F (36.7  C) Temp src: Oral BP: (!) 145/49 Pulse: 74   Resp: 16 SpO2: 95 % O2 Device: None (Room air)    Weight: 114 lbs 3.17 oz     Constitutional: Awake, alert, cooperative, no apparent distress  Respiratory: Clear to auscultation bilaterally, no crackles or wheezing  Cardiovascular: Regular rate and rhythm, normal S1 and S2, and no murmur noted  GI: Normal bowel sounds, no major tenderness distended with obvious  gas  Skin/Integumen: No rashes, no cyanosis, no edema  MSK: no joint swelling    Data   Recent Labs   Lab 01/13/22  0708 01/11/22  1125 01/09/22  2049   WBC 8.3 8.4 6.7   HGB 11.9 11.9 12.2   MCV 92 92 93    230 276    137 130*   POTASSIUM 3.5 3.4 3.7   CHLORIDE 105 107 99   CO2 22 24 25   BUN 12 16 35*   CR 0.58 0.55 0.62   ANIONGAP 7 6 6   BERNARDA 8.0* 8.0* 8.6   GLC 80 102* 142*   ALBUMIN  --   --  3.6   PROTTOTAL  --   --  7.1   BILITOTAL  --   --  0.4   ALKPHOS  --   --  51   ALT  --   --  26   AST  --   --  15   LIPASE  --   --  243     No results found for this or any previous visit (from the past 24 hour(s)).  Medications       aspirin  81 mg Oral Daily     calcium citrate-vitamin D  1 tablet Oral BID     enoxaparin ANTICOAGULANT  40 mg Subcutaneous Q24H     lactobacillus rhamnosus (GG)  2 capsule Oral BID     mycophenolate  1,000 mg Oral BID     piperacillin-tazobactam  3.375 g Intravenous Q6H     predniSONE  5 mg Oral Daily     simethicone  40 mg Oral 4x Daily     sodium chloride (PF)  3 mL Intracatheter Q8H     Vitamin D3  25 mcg Oral Daily

## 2022-01-13 NOTE — PLAN OF CARE
A&Ox4.  VSS except HTN, RA.  Complained of slight abdominal discomfort; controlled with PRN Tylenol and heat packs.  Tolerating low fiber diet, slight nausea reported last night; Zofran given.  PIV infusing NS @ 100 mL/hr; intermittent antibiotics.  SBA with walker.  Discharge pending progress.

## 2022-01-13 NOTE — PROGRESS NOTES
[facility]    General Surgery  Daily Note       Assessment and Plan:   Maria E Webster is a 81 year old female with contained perforation of jejunal diverticulum     - Medical management per primary team  -Recommend returning patient to clear liquid diet with smaller, more frequent meals as tolerated  - Pain control prn  - IV antibiotics  - No indication for emergent surgical intervention  - Surgery to follow        Interval History:   Patient remains stable.  Afebrile with stable white blood cell count in the normal range.  Patient reports increased gas pain with oral intake.  She has been having bowel movements.           Physical Exam:   Temp: 98.1  F (36.7  C) Temp src: Oral BP: (!) 145/49 Pulse: 74   Resp: 16 SpO2: 95 % O2 Device: None (Room air)      No intake/output data recorded.      Constitutional: healthy, alert and no distress   Respiratory: Breathing nonlabored  Abdomen: Soft, distended, mildly tender with palpation over central abdomen      Data   Recent Labs   Lab 01/13/22  0708 01/11/22  1125 01/09/22  2049   WBC 8.3 8.4 6.7   HGB 11.9 11.9 12.2   MCV 92 92 93    230 276    137 130*   POTASSIUM 3.5 3.4 3.7   CHLORIDE 105 107 99   CO2 22 24 25   BUN 12 16 35*   CR 0.58 0.55 0.62   ANIONGAP 7 6 6   BERNARDA 8.0* 8.0* 8.6   GLC 80 102* 142*   ALBUMIN  --   --  3.6   PROTTOTAL  --   --  7.1   BILITOTAL  --   --  0.4   ALKPHOS  --   --  51   ALT  --   --  26   AST  --   --  15       Yoselyn Olvera MD

## 2022-01-13 NOTE — CONSULTS
Care Management Follow Up    Length of Stay (days): 3    Expected Discharge Date: 01/13/2022     Concerns to be Addressed: discharge planning     Patient plan of care discussed at interdisciplinary rounds: Yes    Anticipated Discharge Disposition: Home,Home Care,DME,Outpatient Rehab (PT, OT, SLP, Cardiac or Pulmonary)     Anticipated Discharge Services: None  Anticipated Discharge DME: None    Patient/family educated on Medicare website which has current facility and service quality ratings: yes  Education Provided on the Discharge Plan:    Patient/Family in Agreement with the Plan: yes    Referrals Placed by CM/SW: Homecare,External Care Coordination  Private pay costs discussed: Not applicable    Additional Information:  Writer met with the patient at the bedside today.  Patient informs this writer that she had a good night overall, however she was having gas pains after dinner and is feeling bloated.  Patient is stating she will most likely remain here until there is a final diet plan in place.  Will continue to follow for further discharge planning needs as identified.     Marie Rosas RN, BSN, ACM   Care Transitions Specialist   [facility]  Care Transitions Specialist   [address] [place] [address]  [email or web address]  Office:[phone] Fax: [phone]  [facility]

## 2022-01-13 NOTE — PLAN OF CARE
[id number]:   Pt is A&Ox4, VSS on RA. IV infusing w/ int Zosyn. Denies acute pain but does endorse gas discomfort, PRN Simethicone given. Pt expressed relief but reported discomfort returned after eating dinner; low fiber diet. No BM this evening, voiding adequately in BR. Up SBA w/ walker, GB; ambulated halls & tolerated well. Discharge plan pending.

## 2022-01-13 NOTE — PLAN OF CARE
VSS. A&Ox4. Took a few bites of oatmeal this AM and had some associated abdominal discomfort, bloating. No nausea or emesis. Tylenol given with some improvement. Returned to clear liquids this afternoon, trialing jell-o now. 2 small BM's today; no blood per pt report. Started on scheduled simethicone and culturelle. Up in chair for meals. Ambulated in pina. Up with A 1 GB and walker. Plan for probable discharge home tomorrow pending diet tolerance.

## 2022-01-14 VITALS
WEIGHT: 114.2 LBS | SYSTOLIC BLOOD PRESSURE: 126 MMHG | RESPIRATION RATE: 16 BRPM | OXYGEN SATURATION: 95 % | TEMPERATURE: 97 F | BODY MASS INDEX: 23.02 KG/M2 | DIASTOLIC BLOOD PRESSURE: 45 MMHG | HEIGHT: 59 IN | HEART RATE: 83 BPM

## 2022-01-14 PROCEDURE — 250N000011 HC RX IP 250 OP 636: Performed by: INTERNAL MEDICINE

## 2022-01-14 PROCEDURE — 250N000012 HC RX MED GY IP 250 OP 636 PS 637: Performed by: STUDENT IN AN ORGANIZED HEALTH CARE EDUCATION/TRAINING PROGRAM

## 2022-01-14 PROCEDURE — 250N000013 HC RX MED GY IP 250 OP 250 PS 637: Performed by: STUDENT IN AN ORGANIZED HEALTH CARE EDUCATION/TRAINING PROGRAM

## 2022-01-14 PROCEDURE — 250N000011 HC RX IP 250 OP 636: Performed by: STUDENT IN AN ORGANIZED HEALTH CARE EDUCATION/TRAINING PROGRAM

## 2022-01-14 PROCEDURE — 99239 HOSP IP/OBS DSCHRG MGMT >30: CPT | Performed by: STUDENT IN AN ORGANIZED HEALTH CARE EDUCATION/TRAINING PROGRAM

## 2022-01-14 PROCEDURE — 99231 SBSQ HOSP IP/OBS SF/LOW 25: CPT | Performed by: SURGERY

## 2022-01-14 RX ORDER — LACTOBACILLUS RHAMNOSUS GG 10B CELL
2 CAPSULE ORAL 2 TIMES DAILY
Qty: 60 CAPSULE | Refills: 0 | Status: SHIPPED | OUTPATIENT
Start: 2022-01-14 | End: 2022-01-14

## 2022-01-14 RX ORDER — SIMETHICONE 40MG/0.6ML
40 SUSPENSION, DROPS(FINAL DOSAGE FORM)(ML) ORAL 4 TIMES DAILY
Qty: 36 ML | Refills: 0 | Status: SHIPPED | OUTPATIENT
Start: 2022-01-14 | End: 2022-01-29

## 2022-01-14 RX ORDER — LACTOBACILLUS RHAMNOSUS GG 10B CELL
2 CAPSULE ORAL 2 TIMES DAILY
Qty: 60 CAPSULE | Refills: 0 | Status: SHIPPED | OUTPATIENT
Start: 2022-01-14 | End: 2023-03-17

## 2022-01-14 RX ADMIN — ASPIRIN 81 MG: 81 TABLET, COATED ORAL at 08:30

## 2022-01-14 RX ADMIN — PIPERACILLIN SODIUM AND TAZOBACTAM SODIUM 3.38 G: 3; .375 INJECTION, POWDER, LYOPHILIZED, FOR SOLUTION INTRAVENOUS at 10:41

## 2022-01-14 RX ADMIN — PIPERACILLIN SODIUM AND TAZOBACTAM SODIUM 3.38 G: 3; .375 INJECTION, POWDER, LYOPHILIZED, FOR SOLUTION INTRAVENOUS at 04:55

## 2022-01-14 RX ADMIN — Medication 25 MCG: at 10:40

## 2022-01-14 RX ADMIN — Medication 1 TABLET: at 10:40

## 2022-01-14 RX ADMIN — MYCOPHENOLATE MOFETIL 1000 MG: 500 TABLET, FILM COATED ORAL at 08:29

## 2022-01-14 RX ADMIN — SIMETHICONE 40 MG: 20 EMULSION ORAL at 12:32

## 2022-01-14 RX ADMIN — Medication 2 CAPSULE: at 08:30

## 2022-01-14 RX ADMIN — ENOXAPARIN SODIUM 40 MG: 40 INJECTION SUBCUTANEOUS at 10:40

## 2022-01-14 RX ADMIN — PREDNISONE 5 MG: 5 TABLET ORAL at 08:30

## 2022-01-14 RX ADMIN — SIMETHICONE 40 MG: 20 EMULSION ORAL at 08:30

## 2022-01-14 ASSESSMENT — ACTIVITIES OF DAILY LIVING (ADL)
ADLS_ACUITY_SCORE: 7
ADLS_ACUITY_SCORE: 9
ADLS_ACUITY_SCORE: 7
ADLS_ACUITY_SCORE: 9
ADLS_ACUITY_SCORE: 7
ADLS_ACUITY_SCORE: 9
ADLS_ACUITY_SCORE: 7

## 2022-01-14 NOTE — PLAN OF CARE
A&O, VSS on RA, abd discomfort improved, independent, on low fiber diet, denies nausea, discharge instruction done, patient and  acknowledged understanding.

## 2022-01-14 NOTE — PLAN OF CARE
[id number] Shift note:     Pt A&Ox4, VSS on RA. Pt up ind in the room. Pt reported abdominal discomfort/gas pain is much better with use of simethicone. Pt wondering about getting a script for this at home. No nausea, passing gas and had a BM yesterday. Tolerating clears, hopeful to advance in the morning. Continuing IV Zosyn until discharge. Possible discharge to home today. Will continue plan of care.

## 2022-01-14 NOTE — PLAN OF CARE
[id number] shift. Vitals stable. Ambulating halls SBA w/ walker. Some abdominal discomfort/fullness- scheduled simethicone, declined further intervention. No nausea, passing gas and BM today. Tolerating sips of clears. Surgery following, continuing IV Zosyn. Possible discharge to home tomorrow.

## 2022-01-14 NOTE — PROGRESS NOTES
[facility]    General Surgery  Daily Note       Assessment and Plan:   Maria E Webster is a 81 year old female with contained perforation of jejunal diverticulum     - Medical management per primary team  -Recommend smaller, more frequent meals as tolerated.  Also recommend continuation of clear/full liquid diet with slow introduction of solid foods as tolerated.  - Pain control prn  - IV antibiotics --> transition to oral regimen for discharge to complete a 14-day course  - No indication for emergent surgical intervention  -Okay to discharge from general surgery standpoint.  Patient may follow-up with surgery outpatient on an as-needed basis.        Interval History:   Patient remains stable.  Afebrile.  Tolerating oral intake better with addition of simethicone.  Did have some issues after eating macaroni earlier today.  Having bowel movements.           Physical Exam:   Temp: 97  F (36.1  C) Temp src: Oral BP: 126/45 Pulse: 83   Resp: 16 SpO2: 95 % O2 Device: None (Room air)      I/O last 3 completed shifts:  In: 120 [P.O.:120]  Out: -       Constitutional: healthy, alert and no distress   Respiratory: Breathing nonlabored  Abdomen: Soft, slightly distended, mildly tender with palpation over central abdomen      Data   Recent Labs   Lab 01/13/22  0708 01/11/22  1125 01/09/22  2049   WBC 8.3 8.4 6.7   HGB 11.9 11.9 12.2   MCV 92 92 93    230 276    137 130*   POTASSIUM 3.5 3.4 3.7   CHLORIDE 105 107 99   CO2 22 24 25   BUN 12 16 35*   CR 0.58 0.55 0.62   ANIONGAP 7 6 6   BERNARDA 8.0* 8.0* 8.6   GLC 80 102* 142*   ALBUMIN  --   --  3.6   PROTTOTAL  --   --  7.1   BILITOTAL  --   --  0.4   ALKPHOS  --   --  51   ALT  --   --  26   AST  --   --  15       Yoselyn Olvera MD

## 2022-01-14 NOTE — CONSULTS
Care Management Discharge Note    Discharge Date: 01/14/2022       Discharge Disposition: Home,Home Care,DME,Outpatient Rehab (PT, OT, SLP, Cardiac or Pulmonary)    Discharge Services: None    Discharge DME: None    Discharge Transportation: family or friend will provide    Private pay costs discussed: Not applicable    PAS Confirmation Code:  n/a  Patient/family educated on Medicare website which has current facility and service quality ratings: yes    Education Provided on the Discharge Plan:  yes  Persons Notified of Discharge Plans: patient  Patient/Family in Agreement with the Plan: yes    Handoff Referral Completed: Yes    Additional Information:  Writer met with the patient at the bedside.  Patient has orders for discharge to home.  Patient stated she is still having issues with tolerating advancement of diet and unfortunately is awaiting MD to see her for update. Patient again declined the need for any services at discharge.         Marie Rosas RN

## 2022-01-15 DIAGNOSIS — Z71.89 OTHER SPECIFIED COUNSELING: ICD-10-CM

## 2022-01-16 ENCOUNTER — NURSE TRIAGE (OUTPATIENT)
Dept: NURSING | Facility: CLINIC | Age: 82
End: 2022-01-16
Payer: MEDICARE

## 2022-01-16 ENCOUNTER — PATIENT OUTREACH (OUTPATIENT)
Dept: CARE COORDINATION | Facility: CLINIC | Age: 82
End: 2022-01-16
Payer: MEDICARE

## 2022-01-16 NOTE — DISCHARGE SUMMARY
[facility]  Hospitalist Discharge Summary      Date of Admission:  1/9/2022  Date of Discharge:  1/14/2022  4:41 PM  Discharging Provider: Margaret Ybarra, DO      Discharge Diagnoses   See below    Follow-ups Needed After Discharge   Follow-up Appointments     Follow-up and recommended labs and tests       Follow up with primary care provider, Jennifer Garcia, within 7 days to   assess how you are feeling after being in the hospital.             Unresulted Labs Ordered in the Past 30 Days of this Admission     No orders found from 12/10/2021 to 1/10/2022.          Discharge Disposition   Discharged to home  Condition at discharge: Stable      Hospital Course   Maria E Webster is a very pleasant 81 year old female with h/o renal transplant in 2009, CKD stage II, hypertension, dyslipidemia, depression, anxiety, h/o sigmoid diverticulosis who was admitted on 1/9/2022 with perforated jejunal diverticulitis in an immunosuppressed patient. She was treated with IV antibiotics and demonstrated significant improvement in her symptoms. She remained hospitalized due to gas and distension and difficulty tolerating diet advancement. She eventually discharge with scheduled simethicone and probiotics and oral antibiotics to complete 14 days of treatment. She was recommended to continue slow advancement of her diet     Perforated Jejunal Diverticulitis  Right lower quadrant abdominal pain of abrupt onset late afternoon on 1/9/21 (Sunday). Several systolic blood pressure measurements in the 80 range that responded to IV fluid bolus. No elevated WBC at this time but is on chronic immunosuppression meds. Does have history of sigmoid diverticulosis. Abdominal CTA with findings consistent with perforated jejunal diverticulitis.  Imaging not convincing for mesenteric artery occlusions or stenoses. Very sensitive to pain medications so she would prefer acetaminophen over opiates.      Post living related donor  kidney transplant 2009  CKD stage II  Polycystic kidney disease  On chronic immunosuppression with prednisone and CellCept.  Imaging did not show any hydronephrosis of the left lower quadrant transplanted kidney. Renal function at baseline GFR of 90.      >Resume prior to admission prednisone and CellCept  >Renal function stable     Hypertension  Dyslipidemia  Managed PTA with aspirin, restart       Consultations This Hospital Stay   SURGERY GENERAL IP CONSULT  PHYSICAL THERAPY ADULT IP CONSULT  CARE MANAGEMENT / SOCIAL WORK IP CONSULT    Code Status   Prior    Time Spent on this Encounter   I, Margaret Ybarra DO, personally saw the patient today and spent greater than 30 minutes discharging this patient.       Margaret Ybarra DO  [place] [address] [facility]  [address] [place] [address] [address]VE, [address]  [facility] [address]  Phone: [phone]  ______________________________________________________________________    Physical Exam   Vital Signs:                    Weight: 114 lbs 3.17 oz         Primary Care Physician   Jennifer Garcia    Discharge Orders      Reason for your hospital stay    You presented for a perforation in bowel from diverticulitis. You required IV antibiotics and slow initiation of food. Go home and take things slow. Rest. Eat small bland meals until feeling better.     Follow-up and recommended labs and tests     Follow up with primary care provider, Jennifer Garcia, within 7 days to assess how you are feeling after being in the hospital.     Activity    Your activity upon discharge: activity as tolerated     Diet    Follow this diet upon discharge: Orders Placed This Encounter      Advance as tolerated to regular diet.       Significant Results and Procedures   Most Recent 3 CBC's:Recent Labs   Lab Test 01/13/22  0708 01/11/22  1125 01/09/22  2049   WBC 8.3 8.4 6.7   HGB 11.9 11.9 12.2   MCV 92 92 93    230 276     Most Recent 3 BMP's:Recent Labs   Lab Test 01/13/22  0708 01/11/22  1125  01/09/22 2049    137 130*   POTASSIUM 3.5 3.4 3.7   CHLORIDE 105 107 99   CO2 22 24 25   BUN 12 16 35*   CR 0.58 0.55 0.62   ANIONGAP 7 6 6   BERNARDA 8.0* 8.0* 8.6   GLC 80 102* 142*     Most Recent 2 LFT's:Recent Labs   Lab Test 01/09/22 2049   AST 15   ALT 26   ALKPHOS 51   BILITOTAL 0.4   ,   Results for orders placed or performed during the hospital encounter of 01/09/22   CT Abdomen pelvis - oral contrast only    Narrative    EXAM: CT ABDOMEN PELVIS W/O CONTRAST  LOCATION: [facility]  DATE/TIME: 1/9/2022 10:44 PM    INDICATION: Right-sided abdominal pain, kidney transplant on left.  COMPARISON: 01/14/2016.  TECHNIQUE: CT scan of the abdomen and pelvis was performed without IV contrast. Multiplanar reformats were obtained. Dose reduction techniques were used.  CONTRAST: None.    FINDINGS:   LOWER CHEST: Small amount of contrast seen within the esophagus, correlate to exclude gastroesophageal reflux.    HEPATOBILIARY: Extensive hepatic cysts, not significantly changed, in combination with known renal cysts consistent with polycystic kidney disease.    PANCREAS: Normal.    SPLEEN: Normal.    ADRENAL GLANDS: Normal.    KIDNEYS/BLADDER: Bilateral enlarged polycystic kidneys consistent with polycystic kidney disease. Some of which appear more hyperdense, likely representing proteinaceous or hemorrhagic cysts. However, a new solid-appearing nodule measuring 2.3 cm is seen   arising from the inferior pole of the right kidney (2, 53), attention to detail on follow-up imaging with contrast.     Left renal transplant kidney. No hydroureteronephrosis.    BOWEL: Short segment loop of mural thickening along the mesenteric border of a loop of jejunum in the left mid abdomen (2, 51). Punctate foci of extramural air (2, 41) possibly free or within the vasculature. Trace free fluid. No obstruction,   appendicitis, diverticulitis, or colitis. Mild diverticulosis.    LYMPH NODES:  Normal.    VASCULATURE: No aneurysm. Severe atherosclerotic vascular disease.    PELVIC ORGANS: Absent uterus. Pessary device.    MUSCULOSKELETAL: Multilevel discogenic degenerative change.      Impression    IMPRESSION:   1.  Short segment mural thickening of the jejunum with inflammatory change of the adjacent mesenteric border. Small punctate foci of air within the mesentery or vessel raise concern for ischemia and/or focal perforation. Diagnostic considerations are   concerning for focal enteritis, ischemic, inflammatory, or infectious, or jejunal diverticulitis. Recommend follow-up to resolution to exclude underlying malignancy or small bowel lymphoma.  2.  Polycystic kidney disease.  3.  Left pelvic renal transplant.  4.  Severe atherosclerotic vascular disease and coronary artery disease.  5.  2.3 cm enlarged soft tissue density focus seen arising from the inferior pole of the right kidney, though this may reflect a hyperdense cyst, recommend attention to detail on follow-up imaging with contrast.    Findings were discussed with Dr. Young at 11:11 PM on 01/09/2022.     CTA Abdomen Pelvis with Contrast    Narrative    EXAM: CTA ABDOMEN AND PELVIS WITH CONTRAST  LOCATION: [facility]  DATE/TIME: 1/9/2022 11:57 PM    INDICATION: Right-sided abdominal pain. Possible mesenteric ischemia.  COMPARISON: 01/09/2022 at 2244 hours.  TECHNIQUE: CT angiogram abdomen pelvis during arterial and portal venous phases of injection of IV contrast. 2D and 3D MIP reconstructions were performed by the CT technologist. Dose reduction techniques were used.  CONTRAST: 80 mL Isovue-370.    FINDINGS:  ANGIOGRAM ABDOMEN AND PELVIS: The abdominal aorta is normal in caliber without dissection. Atherosclerotic calcification in the abdominal aorta. No significant stenosis or occlusion of the major branches of the celiac, superior mesenteric and inferior   mesenteric arteries. Severe narrowing of the right renal  artery. Probable chronic occlusion of the left renal artery.    LOWER CHEST: Coronary artery calcification.    HEPATOBILIARY: Numerous cysts of varying sizes scattered within the liver, the largest a 6.6 cm cyst in the right lobe.    PANCREAS: Unremarkable.    SPLEEN: Unremarkable.    ADRENAL GLANDS: Unremarkable.    KIDNEYS/BLADDER: Innumerable cysts of varying sizes scattered throughout bilateral native kidneys, the largest in the right kidney and measuring 7 cm. The appearance is consistent with autosomal dominant polycystic kidney disease. There is no enhancement   of the 2.3 cm mildly hyperattenuating lesion in the inferior pole of the right kidney that was described on the recent comparison study and therefore is consistent with a hemorrhagic or proteinaceous cyst. There is moderate to severe atrophy of the   parenchyma of the native kidneys. Left lower quadrant renal transplant.    BOWEL: A few loops of mildly dilated small bowel are present in the anterior aspect of the mid abdomen. The more distal small bowel and colon are relatively nondistended. A 5 cm region of haziness is present in the mesenteric fat that is closely   associated with jejunum in the mid left hemiabdomen (for example, series 6 image 107). There are several foci of extraluminal gas in the bowel mesentery at this location. There are a few small diverticula in the jejunum in this region. The haziness and   extraluminal gas have increased since the recent comparison study. The appendix is not visualized.    LYMPH NODES: Unremarkable.    PELVIC ORGANS: A pessary is present in the vagina.    MUSCULOSKELETAL: No acute findings.    OTHER: A very small amount of free fluid in the pelvis. Tiny paraumbilical hernia containing fat.      Impression    IMPRESSION:  1.  A 5 cm region of haziness in the mesenteric fat of the mid left hemiabdomen, suggestive of inflammation. There are several foci of extraluminal gas in this region that are consistent  with a localized small bowel perforation. The extent of the   haziness and amount of extraluminal gas have mildly increased since the very recent comparison CT scan. There are several small diverticula in the adjacent jejunum and therefore perforated jejunal diverticulitis is the most likely cause of these   findings.  2.  No evidence of occlusion or significant stenosis of the major branches of the celiac, superior mesenteric or inferior mesenteric arteries.  3.  A few loops of mildly dilated small bowel are present in the anterior aspect of the mid abdomen and the more distal bowel is relatively decompressed, new since the comparison study. This is suggestive of a partial small bowel obstruction that could   possibly relate to the aforementioned abnormal jejunal findings.  4.  A very small amount of free fluid in the pelvis.    Findings were called to Dr. Lemon by Dr. Reich on 01/10/2022 at 0038 hours.       Discharge Medications   Discharge Medication List as of 1/14/2022  4:13 PM      START taking these medications    Details   amoxicillin-clavulanate (AUGMENTIN) 875-125 MG tablet Take 1 tablet by mouth every 12 hours for 9 days, Disp-18 tablet, R-0, E-Prescribe      simethicone (MYLICON) 40 MG/0.6ML suspension Take 0.6 mLs (40 mg) by mouth 4 times daily for 15 days, Disp-36 mL, R-0, E-Prescribe         CONTINUE these medications which have CHANGED    Details   lactobacillus rhamnosus, GG, (CULTURELL) capsule Take 2 capsules by mouth 2 times daily, Disp-60 capsule, R-0, Local Print         CONTINUE these medications which have NOT CHANGED    Details   aspirin 81 MG EC tablet Take 81 mg by mouth daily , Historical      calcium citrate-vitamin D (CITRACAL) 315-200 MG-UNIT TABS per tablet Take 1 tablet by mouth 2 times daily, Historical      Cholecalciferol (VITAMIN D) 400 UNIT capsule Take 1 capsule by mouth daily., Historical      denosumab (PROLIA) 60 MG/ML SOSY injection Inject 60 mg Subcutaneous every 6  months, Historical      Multiple Vitamin (MULTI-VITAMIN) per tablet Take 1 tablet by mouth daily., Historical      mycophenolate (CELLCEPT-BRAND NAME) 500 MG tablet Take 1,000 mg by mouth 2 times daily., Historical      predniSONE (DELTASONE) 5 MG tablet Take 5 mg by mouth daily, Historical           Allergies   Allergies   Allergen Reactions     Codeine Nausea and Vomiting     Darvocet [Propoxyphene N-Apap] Nausea and Vomiting     Morphine Nausea and Vomiting

## 2022-01-16 NOTE — PROGRESS NOTES
Clinic Care Coordination Contact  [facility]: Post-Discharge Note  SITUATION                                                      Admission:    Admission Date: 01/09/22   Reason for Admission: Diverticulitis of jejunum with perforationIschemia, bowel (H)  Discharge:   Discharge Date: 01/14/22  Discharge Diagnosis: Diverticulitis of jejunum with perforationIschemia, bowel (H)    BACKGROUND                                                      Maria E Webster is a very pleasant 81 year old female with h/o renal transplant in 2009, CKD stage II, hypertension, dyslipidemia, depression, anxiety, h/o sigmoid diverticulosis who was admitted on 1/9/2022 with perforated jejunal diverticulitis in an immunosuppressed patient. She was treated with IV antibiotics and demonstrated significant improvement in her symptoms. She remained hospitalized due to gas and distension and difficulty tolerating diet advancement. She eventually discharge with scheduled simethicone and probiotics and oral antibiotics to complete 14 days of treatment. She was recommended to continue slow advancement of her diet     Perforated Jejunal Diverticulitis  Right lower quadrant abdominal pain of abrupt onset late afternoon on 1/9/21 (Sunday). Several systolic blood pressure measurements in the 80 range that responded to IV fluid bolus. No elevated WBC at this time but is on chronic immunosuppression meds. Does have history of sigmoid diverticulosis. Abdominal CTA with findings consistent with perforated jejunal diverticulitis.  Imaging not convincing for mesenteric artery occlusions or stenoses. Very sensitive to pain medications so she would prefer acetaminophen over opiates.      Post living related donor kidney transplant 2009  CKD stage II  Polycystic kidney disease  On chronic immunosuppression with prednisone and CellCept.  Imaging did not show any hydronephrosis of the left lower quadrant transplanted kidney. Renal function at baseline GFR  of 90.      >Resume prior to admission prednisone and CellCept  >Renal function stable     Hypertension  Dyslipidemia  Managed PTA with aspirin, restart    ASSESSMENT      Enrollment  Primary Care Care Coordination Status: Not a Candidate    Discharge Assessment  How are you doing now that you are home?: Pt reports that for the most part she is doing well. She has been having some loose stools as a result of her medications. Pt plans on calling nurse triage.  How are your symptoms? (Red Flag symptoms escalate to triage hotline per guidelines): Improved  Do you feel your condition is stable enough to be safe at home until your provider visit?: Yes  Does the patient have their discharge instructions? : Yes  Does the patient have questions regarding their discharge instructions? : No  Were you started on any new medications or were there changes to any of your previous medications? : Yes  Does the patient have all of their medications?: Yes  Do you have questions regarding any of your medications? : No  Do you have all of your needed medical supplies or equipment (DME)?  (i.e. oxygen tank, CPAP, cane, etc.): Yes  Discharge follow-up appointment scheduled within 14 calendar days? : Yes  Discharge Follow Up Appointment Date: 01/21/22  Discharge Follow Up Appointment Scheduled with?: Primary Care Provider              PLAN                                                      Outpatient Plan:     Follow up with primary care provider, Jennifer Garcia, within 7 days to assess how you are feeling after being in the hospital.         No future appointments.      For any urgent concerns, please contact our 24 hour nurse triage line: [phone] ([phone])         CHRIS Bowman  [phone]  [facility]

## 2022-01-16 NOTE — TELEPHONE ENCOUNTER
"  S-(situation): Call from patient who was in the hospital for diagnosed with diverticulitis from last Sun through Friday. Patrient says she was sent home with amoxicillin which is causing diarrhea.    Patient says diarrhea occurs right after she takes the medication.    Patient says she want to try taking 1/2 the dose of the medication.      B-(background):   PCP: Radha March    A-(assessment):   PCP can help with recommendation    R-(recommendations): Advised to call PCP's office tomorrow.    Caller verbalized understanding.          Alyx Ring RN/[facility]    Reason for Disposition    [1] Caller has NON-URGENT medication question about med that PCP prescribed AND [2] triager unable to answer question    Additional Information    Negative: MORE THAN A DOUBLE DOSE of a prescription or over-the-counter (OTC) drug    Negative: [1] DOUBLE DOSE (an extra dose or lesser amount) of over-the-counter (OTC) drug AND [2] any symptoms (e.g., dizziness, nausea, pain, sleepiness)    Negative: [1] DOUBLE DOSE (an extra dose or lesser amount) of prescription drug AND [2] any symptoms (e.g., dizziness, nausea, pain, sleepiness)    Negative: Took another person's prescription drug    Negative: [1] DOUBLE DOSE (an extra dose or lesser amount) of prescription drug AND [2] NO symptoms (Exception: a double dose of antibiotics)    Negative: Diabetes drug error or overdose (e.g., took wrong type of insulin or took extra dose)    Negative: [1] Request for URGENT new prescription or refill of \"essential\" medication (i.e., likelihood of harm to patient if not taken) AND [2] triager unable to fill per unit policy    Negative: [1] Prescription not at pharmacy AND [2] was prescribed by PCP recently    Negative: [1] Pharmacy calling with prescription questions AND [2] triager unable to answer question    Negative: [1] Caller has URGENT medication question about med that PCP or specialist prescribed AND [2] " triager unable to answer question    Protocols used: MEDICATION QUESTION CALL-A-AH

## 2022-01-20 ENCOUNTER — NURSE TRIAGE (OUTPATIENT)
Dept: NURSING | Facility: CLINIC | Age: 82
End: 2022-01-20
Payer: MEDICARE

## 2022-01-20 NOTE — TELEPHONE ENCOUNTER
Was in hospital and came home with med list    Her med list states that she is taking celcept 2 500mg tabs (1000mg) twice daily    She actually is taking 250mg 2 tabs (500mg)  twice daily    She is fairly certain they were giving her the wrong dose of medication.     Wondering how she can get this changed in her chart, just in case she were to be hospitalized again.     Pt's PCP is [facility], does not see any providers within [facility] system.     Advised pt to call Medical records to have this information changed.     Patient is agreeable.     Reason for Disposition    Health Information question, no triage required and triager able to answer question    Protocols used: INFORMATION ONLY CALL - NO TRIAGE-A-    Perri Ng RN on 1/20/2022 at 7:17 AM

## 2022-01-21 NOTE — MR AVS SNAPSHOT
After Visit Summary   1/31/2018    Maria E Webster    MRN: [id number]           Patient Information     Date Of Birth          1940        Visit Information        Provider Department      1/31/2018 11:15 AM Brii Mayer DPM, Podiatry/Foot and Ankle Surgery [facility]        Care Instructions    Thank you for choosing [facility]!    DR[facility] CLIN[facility] LOCATIONS:   MONDAY AM - [place] TUESDAY - [place]   [address] [address] [place] [address] [place] [address]   [phone] / [phone] / [phone]       WEDNESDAY - [place] FRIDAY PM - [place]   [address]   [place] [address] [place] [address]   [phone] / [phone] / [phone]       SCHEDULE SURGERY: [phone]    APPOINTMENTS: [phone]    BILLING QUESTIONS: [phone]      HAMMERTOE PADS / TOE SPLINTS    HAMMERTOES  Hammertoe is a contracture (bending) of one or both joints of the second, third, fourth, or fifth (little) toes. This abnormal bending can put pressure on the toe when wearing shoes, causing problems to develop.  Hammertoes usually start out as mild deformities and get progressively worse over time. In the earlier stages, hammertoes are flexible and the symptoms can often be managed with noninvasive measures. But if left untreated, hammertoes can become more rigid and will not respond to non-surgical treatment.  Because of the progressive nature of hammertoes, they should receive early attention. Hammertoes never get better without some kind of intervention.  CAUSES  The most common cause of hammertoe is a muscle/tendon imbalance. This imbalance, which leads to a bending of the toe, results from mechanical (structural) changes in the foot that occur over time in some people.  Hammertoes may be aggravated by shoes that don t fit properly. A hammertoe may result  "Chief Complaint  Diabetes    Subjective          Sudhakar Saul presents to [facility] ENDOCRINOLOGY  History of Present Illness       In office visit        48 year old female presents for follow up                Diagnosed in her 20s       Reason diabetes mellitus type 2     Timing constant      Quality not controlled     Lab Results   Component Value Date    HGBA1C 8.00 (H) 10/21/2021             Severity high               Macrovascular-- no CAD, no PAD, no CVA                     Microvascular--- neuropathy, mild diabetic retinopathy , CKD stage II     COPD , hepatitis C                      Diabetes Regimen     Insulin through pump          omni pod            Blood Glucose Readings     Checking 4 times daily      Uses dexcom       see below              Review of Systems - General ROS: negative        Objective   Vital Signs:   /74   Pulse 100   Ht 167.6 cm (66\")   Wt (!) 155 kg (342 lb 4.8 oz)   SpO2 (!) 86%   BMI 55.25 kg/m²     Physical Exam  Cardiovascular:      Rate and Rhythm: Regular rhythm.      Heart sounds: Normal heart sounds.   Pulmonary:      Breath sounds: Normal breath sounds.   Musculoskeletal:      Cervical back: Normal range of motion.   Neurological:      General: No focal deficit present.      Mental Status: She is alert.   Psychiatric:         Mood and Affect: Mood normal.         Thought Content: Thought content normal.         Judgment: Judgment normal.        Result Review :   The following data was reviewed by: ROD Chawla on 01/21/2022:  Common labs    Common Labsle 7/20/21 7/20/21 7/20/21 7/20/21 10/12/21 10/12/21 10/21/21    0808 0808 0808 0808 1427 1428    Glucose    146 (A)  109 (A)    BUN    20  12    Creatinine    0.57  0.75    eGFR Non African Am    114  83    Sodium    136  133 (A)    Potassium    4.8  4.3    Chloride    102  98    Calcium    9.1  9.4    Albumin    3.70      Total Bilirubin    <0.2      Alkaline " Phosphatase    76      AST (SGOT)    16      ALT (SGPT)    19      WBC 11.45 (A)    11.86 (A)     Hemoglobin 13.8    13.3     Hematocrit 41.4    40.5     Platelets 176    202     Total Cholesterol   123       Triglycerides   114       HDL Cholesterol   43       LDL Cholesterol    59       Hemoglobin A1C  7.60 (A)     8.00 (A)   (A) Abnormal value                      Assessment and Plan    Diagnoses and all orders for this visit:    1. Uncontrolled type 2 diabetes mellitus with hyperglycemia (HCC) (Primary)    2. Essential hypertension  -     CBC & Differential; Future  -     Comprehensive Metabolic Panel; Future  -     Hemoglobin A1c; Future  -     Lipid Panel; Future  -     Microalbumin / Creatinine Urine Ratio - Urine, Clean Catch; Future  -     Protein / Creatinine Ratio, Urine - Urine, Clean Catch; Future  -     TSH; Future  -     Vitamin B12; Future  -     Vitamin D 25 Hydroxy; Future    3. Vitamin D deficiency  -     CBC & Differential; Future  -     Comprehensive Metabolic Panel; Future  -     Hemoglobin A1c; Future  -     Lipid Panel; Future  -     Microalbumin / Creatinine Urine Ratio - Urine, Clean Catch; Future  -     Protein / Creatinine Ratio, Urine - Urine, Clean Catch; Future  -     TSH; Future  -     Vitamin B12; Future  -     Vitamin D 25 Hydroxy; Future    4. Type 2 diabetes mellitus with hyperglycemia, with long-term current use of insulin (HCC)  -     CBC & Differential; Future  -     Comprehensive Metabolic Panel; Future  -     Hemoglobin A1c; Future  -     Lipid Panel; Future  -     Microalbumin / Creatinine Urine Ratio - Urine, Clean Catch; Future  -     Protein / Creatinine Ratio, Urine - Urine, Clean Catch; Future  -     TSH; Future  -     Vitamin B12; Future  -     Vitamin D 25 Hydroxy; Future    Other orders  -     metFORMIN (GLUCOPHAGE) 500 MG tablet; Take 1 tablet by mouth 2 (Two) Times a Day With Meals.  Dispense: 60 tablet; Refill: 3  -     Dulaglutide (Trulicity) 4.5 MG/0.5ML  if a toe is too long and is forced into a cramped position when a tight shoe is worn.  Occasionally, hammertoe is the result of an earlier trauma to the toe. In some people, hammertoes are inherited.  SYMPTOMS  Pain or irritation of the affected toe when wearing shoes.   Corns and calluses (a buildup of skin) on the toe, between two toes, or on the ball of the foot. Corns are caused by constant friction against the shoe. They may be soft or hard, depending upon their location.   Inflammation, redness, or a burning sensation   Contracture of the toe   In more severe cases of hammertoe, open sores may form.   DIAGNOSIS  Although hammertoes are readily apparent, to arrive at a diagnosis the foot and ankle surgeon will obtain a thorough history of your symptoms and examine your foot. During the physical examination, the doctor may attempt to reproduce your symptoms by manipulating your foot and will study the contractures of the toes. In addition, the foot and ankle surgeon may take x-rays to determine the degree of the deformities and assess any changes that may have occurred.   Hammertoes are progressive - they don t go away by themselves and usually they will get worse over time. However, not all cases are alike - some hammertoes progress more rapidly than others. Once your foot and ankle surgeon has evaluated your hammertoes, a treatment plan can be developed that is suited to your needs.  NON-SURGICAL TREATMENT  There is a variety of treatment options for hammertoe. The treatment your foot and ankle surgeon selects will depend upon the severity of your hammertoe and other factors.  A number of non-surgical measures can be undertaken:  Padding corns and calluses. Your foot and ankle surgeon can provide or prescribe pads designed to shield corns from irritation. If you want to try over-the-counter pads, avoid the medicated types. Medicated pads are generally not recommended because they may contain a small amount of  acid that can be harmful. Consult your surgeon about this option.   Changes in shoewear. Avoid shoes with pointed toes, shoes that are too short, or shoes with high heels - conditions that can force your toe against the front of the shoe. Instead, choose comfortable shoes with a deep, roomy toe box and heels no higher than two inches.   Orthotic devices. A custom orthotic device placed in your shoe may help control the muscle/tendon imbalance.   Injection therapy. Corticosteroid injections are sometimes used to ease pain and inflammation caused by hammertoe.   Medications. Oral nonsteroidal anti-inflammatory drugs (NSAIDs), such as ibuprofen, may be recommended to reduce pain and inflammation.   Splinting/strapping. Splints or small straps may be applied by the surgeon to realign the bent toe.   Exercises:   1. The Toe Tap  Stand flat on the ground in your bare feet. Raise all of your toes up off the ground as high as you can. Then starting with the little toes, slowly press them down to the ground. After the big toes are on the ground, start over by raising all of them up off the ground again. This motion is similar to tapping your fingers on a desk. Repeat this ten times.     2. Interlocking your Fingers and Toes  Cross your right foot over your knee and place the fingers of your left hand between your toes. Squeeze your toes together, pinching your fingers between them. Spread the toes apart and squeeze them together again. Repeat this ten times then do the other foot. Like most exercises, this will get easier the more you do it. If you are having a lot of difficulty with this exercise, start with just your index finger between your first and second toe, then later add your middle finger between your second and third toes, and so on until you can fit all your fingers between your toes. Do this ten times on each foot. Eventually you will be able to spread your toes apart without using your fingers.    3. Gripping  solution pen-injector; Inject 4.5 mg under the skin into the appropriate area as directed 1 (One) Time Per Week.  Dispense: 4 pen; Refill: 11             Glycemic Management     Diabetes mellitus type 2 not controlled      Lab Results   Component Value Date    HGBA1C 8.00 (H) 10/21/2021           Dexcom G6      Downloaded and reviewed      Dated from Jan. 8 to Jan. 21, 2022     Average bg 199    Time in target 33.2%     High 66.8%     Very high 8.8%     Low 0 %     Very low 0 %          Hyperglycemia all time     Adjust basal               Omni pod         Basal      [place] - 2.65 --increased to 2.75         Carb ratio      4      Correction         14     Average bg 120               Taking trulicity 4.5  mg weekly      Taking Metformin 1000 mg po BID      Taking Steglatro 15 mg one daily         Previous regimen      basaglar taking 63 units ---stop        admelog--stop      Taking about 30 units            Aim for 45 grams of Carbohydrate for meals      Aim for 15 grams of Carbohydrate for snack                        Lipid Management           Taking Simvastatin 40 mg          Total Cholesterol   Date Value Ref Range Status   07/20/2021 123 0 - 200 mg/dL Final     Triglycerides   Date Value Ref Range Status   07/20/2021 114 0 - 150 mg/dL Final     HDL Cholesterol   Date Value Ref Range Status   07/20/2021 43 40 - 60 mg/dL Final     LDL Cholesterol    Date Value Ref Range Status   07/20/2021 59 0 - 100 mg/dL Final              Blood Pressure Management           Taking Lisinopril 40 mg daily               Microvascular Complication Monitoring        Last eye exam   May 2022 , mild DR            Neuropathy        Taking Gabapentin 300 mg tid          Bone Health            taking vitamin d daily      Component      Latest Ref Rng & Units 7/20/2021   25 Hydroxy, Vitamin D      30.0 - 100.0 ng/ml 48.0            Other Diabetes Related Aspects        Lab Results   Component Value Date    [id number] 491 01/26/2021                Thyroid health         Lab Results   Component Value Date    TSH 1.110 10/21/2021                               Follow Up   No follow-ups on file.  Patient was given instructions and counseling regarding her condition or for health maintenance advice. Please see specific information pulled into the AVS if appropriate.         This document has been electronically signed by ROD Chawla on January 21, 2022 11:49 CST.       the Floor   the floor by pressing the pads of your toes (not the tips) into the floor without curling your toes. Relax and repeat this ten times. If your shoes have the proper amount of depth, you should be able to do this with shoes on.    HAMMERTOE TOE SURGERY   Hammertoe surgery is complex. The surgical procedure is an attempt to help the toe lay in a better position. Nearly every structure in the toe will be cut including the tendons, ligaments, skin and bone. Hammertoes are a complex deformity and final toe position is difficult to predict. The only sure way to position a toe is to fuse all three digital joints. That will not happen as some degree of toe motion is needed for walking. The toe may not be completely reduced as the surrounding skin and other structures may not allow the toe to return to a normal position. The tendons on adjacent toes may need to be cut at the time of the original or subsequent surgeries, as interconnections exist between the toes. The toe may drift after surgery. Stiffness may develop leading to new areas of pressure.   Future shoe choices will be critical in allowing the surgery to provide comfort. The toes will still hurt if shoes rub. The original pain may also persist as other foot problems may be contributing to the current pain. The toe may or may not be pinned in place. External pins would require complete avoidance of water on the foot for six weeks. The pin would be removed about six weeks after the surgery. Strict attention to protection is critical. The pin could get bumped or loosen resulting in early removal. Removal might be necessary before the bone heals which would negatively affect the final surgical outcome and toe allignment.         Body Mass Index (BMI)  Many things can cause foot and ankle problems. Foot structure, activity level, foot mechanics and injuries are common causes of pain.  One very important issue that often goes unmentioned, is body weight.  "Extra weight can cause increased stress on muscles, ligaments, bones and tendons.  Sometimes just a few extra pounds is all it takes to put one over her/his threshold. Without reducing that stress, it can be difficult to alleviate pain. Some people are uncomfortable addressing this issue, but we feel it is important for you to think about it. As Foot &  Ankle specialists, our job is addressing the lower extremity problem and possible causes. Regarding extra body weight, we encourage patients to discuss diet and weight management plans with their primary care doctors. It is this team approach that gives you the best opportunity for pain relief and getting you back on your feet.                  Follow-ups after your visit        Who to contact     If you have questions or need follow up information about today's clinic visit or your schedule please contact [facility] directly at [phone].  Normal or non-critical lab and imaging results will be communicated to you by Tadcasthart, letter or phone within 4 business days after the clinic has received the results. If you do not hear from us within 7 days, please contact the clinic through Tadcasthart or phone. If you have a critical or abnormal lab result, we will notify you by phone as soon as possible.  Submit refill requests through ClickOn or call your pharmacy and they will forward the refill request to us. Please allow 3 business days for your refill to be completed.          Additional Information About Your Visit        ClickOn Information     ClickOn lets you send messages to your doctor, view your test results, renew your prescriptions, schedule appointments and more. To sign up, go to [email or web address] . Click on \"Log in\" on the left side of the screen, which will take you to the Welcome page. Then click on \"Sign up Now\" on the right side of the page.     You will be asked to enter the access code listed below, as well as some personal " "information. Please follow the directions to create your username and password.     Your access code is: [email or web address]  Expires: 2018  1:32 PM     Your access code will  in 90 days. If you need help or a new code, please call your [place] clinic or [phone].        Care EveryWhere ID     This is your Care EveryWhere ID. This could be used by other organizations to access your [facility] medical records  [id number]        Your Vitals Were     Height BMI (Body Mass Index)                4' 11\" (1.499 m) 23.23 kg/m2           Blood Pressure from Last 3 Encounters:   18 112/66   18 110/74   17 108/62    Weight from Last 3 Encounters:   18 115 lb (52.2 kg)   18 115 lb (52.2 kg)   17 115 lb (52.2 kg)              Today, you had the following     No orders found for display       Primary Care Provider Office Phone # Fax #    Hernandez Mena [phone]       [facility] [address] [facility] [address]  [facility] [address]        Equal Access to Services     ALYCIA KOVACS : Hadii marialuisa farr hadasho Solorenali, waaxda luqadaha, qaybta kaalmada adeegyada, kayce jameson . So [facility] [phone].    ATENCIÓN: Si habla español, tiene a clements disposición servicios gratuitos de asistencia lingüística. Llame al [phone].    We comply with applicable federal civil rights laws and [place] laws. We do not discriminate on the basis of race, color, national origin, age, disability, sex, sexual orientation, or gender identity.            Thank you!     Thank you for choosing [facility] [place]  for your care. Our goal is always to provide you with excellent care. Hearing back from our patients is one way we can continue to improve our services. Please take a few minutes to complete the written survey that you may receive in the mail after your visit with us. Thank you!             Your Updated Medication List - Protect others " around you: Learn how to safely use, store and throw away your medicines at www.disposemymeds.org.          This list is accurate as of 1/31/18 11:22 AM.  Always use your most recent med list.                   Brand Name Dispense Instructions for use Diagnosis    ASPIRIN EC PO      Take 81 mg by mouth.        CITRACAL + D 315-200 MG-UNIT Tabs per tablet   Generic drug:  calcium citrate-vitamin D      650mg DAILY        COLACE 100 MG capsule   Generic drug:  docusate sodium      Take 100 mg by mouth 2 times daily.        ESTRACE VAGINAL 0.1 MG/GM cream   Generic drug:  estradiol     42.5 g    Use dime size amount to vagina nightly for 2 weeks and then M/W/F night thereafter        METOPROLOL TARTRATE      12.5 mg 2 times daily.        Multi-vitamin Tabs tablet   Generic drug:  multivitamin, therapeutic with minerals      Take 1 tablet by mouth daily.        mycophenolate 500 MG tablet      Take 1,000 mg by mouth 2 times daily.        OMEPRAZOLE      40 mg daily.        phenazopyridine 100 MG tablet    PYRIDIUM    12 tablet    Take 2 tablets (200 mg) by mouth 3 times daily as needed for urinary tract discomfort    Dysuria       polyethylene glycol Packet    MIRALAX/GLYCOLAX     Take 1 packet by mouth daily as needed.        prednisoLONE 5 MG tablet      Take 1 tablet by mouth daily.        sennosides 8.6 MG tablet    SENOKOT     Take 1 tablet by mouth daily as needed.        simvastatin 20 MG tablet    ZOCOR     Take 10 mg by mouth At Bedtime        vitamin D 400 UNITS capsule      Take 1 capsule by mouth daily.

## 2023-03-06 ENCOUNTER — TRANSFERRED RECORDS (OUTPATIENT)
Dept: HEALTH INFORMATION MANAGEMENT | Facility: CLINIC | Age: 83
End: 2023-03-06

## 2023-03-07 ENCOUNTER — TRANSFERRED RECORDS (OUTPATIENT)
Dept: HEALTH INFORMATION MANAGEMENT | Facility: CLINIC | Age: 83
End: 2023-03-07

## 2023-03-15 RX ORDER — CALCIUM CARBONATE 500 MG/1
2 TABLET, CHEWABLE ORAL 2 TIMES DAILY
COMMUNITY

## 2023-03-15 RX ORDER — SIMVASTATIN 5 MG
5 TABLET ORAL AT BEDTIME
Status: ON HOLD | COMMUNITY
Start: 2022-12-17 | End: 2024-06-22

## 2023-03-15 RX ORDER — POLYETHYLENE GLYCOL 3350 17 G/17G
17 POWDER, FOR SOLUTION ORAL DAILY PRN
COMMUNITY
Start: 2021-01-15

## 2023-03-15 RX ORDER — ESTRADIOL 0.1 MG/G
1 CREAM VAGINAL DAILY
COMMUNITY
Start: 2022-09-30

## 2023-03-15 RX ORDER — VITAMIN B COMPLEX
2000 TABLET ORAL DAILY
COMMUNITY
Start: 2021-02-22 | End: 2024-08-04

## 2023-03-16 RX ORDER — ACETAMINOPHEN 325 MG/1
975 TABLET ORAL 2 TIMES DAILY PRN
COMMUNITY

## 2023-03-17 RX ORDER — SENNOSIDES 8.6 MG
1 TABLET ORAL EVERY EVENING
Status: ON HOLD | COMMUNITY
End: 2024-06-22

## 2023-03-17 RX ORDER — DOCUSATE SODIUM 100 MG/1
100 CAPSULE, LIQUID FILLED ORAL EVERY MORNING
Status: ON HOLD | COMMUNITY
End: 2024-06-22

## 2023-03-21 NOTE — PHARMACY-ADMISSION MEDICATION HISTORY
Medication history and patient interview completed by pharmacy intern/student or pre-admitting RN.  Reviewed by pharmacist, including [employer] dispense records, [facility] Care Everywhere, and chart review.       Александр Bragg, Pharm.D., BCPS      Status Changed by Time of change   Nurse Briseida Lynn RN Fri Mar 17, 2023 10:33 AM       Prior to Admission medications    Medication Sig Last Dose Taking? Auth Provider Long Term End Date   acetaminophen (TYLENOL) 325 MG tablet Take 325-650 mg by mouth every 6 hours as needed for mild pain  Yes Reported, Patient     aspirin 81 MG EC tablet Take 81 mg by mouth daily  3/6/2023 Yes Reported, Patient     calcium carbonate (TUMS) 500 MG chewable tablet Take 2 chew tab by mouth daily  Yes Reported, Patient     docusate sodium (COLACE) 100 MG capsule Take 100 mg by mouth every morning  Yes Reported, Patient     estradiol (ESTRACE) 0.1 MG/GM vaginal cream Place 1 g vaginally three times a week  Yes Reported, Patient     Multiple Vitamin (MULTI-VITAMIN) per tablet Take 1 tablet by mouth daily.  Yes Reported, Patient     mycophenolate (CELLCEPT-BRAND NAME) 500 MG tablet Take 500 mg by mouth 2 times daily  Yes Reported, Patient     polyethylene glycol (MIRALAX) 17 GM/Dose powder Take 17 g by mouth daily as needed  Yes Reported, Patient     predniSONE (DELTASONE) 5 MG tablet Take 5 mg by mouth daily  Yes Unknown, Entered By History     sennosides (SENOKOT) 8.6 MG tablet Take 1 tablet by mouth every evening  Yes Reported, Patient     simvastatin (ZOCOR) 5 MG tablet Take 5 mg by mouth At Bedtime  Yes Reported, Patient     Vitamin D3 (CHOLECALCIFEROL) 25 mcg (1000 units) tablet Take 2,000 Units by mouth daily  Yes Reported, Patient

## 2023-03-23 ENCOUNTER — ANESTHESIA (OUTPATIENT)
Dept: SURGERY | Facility: CLINIC | Age: 83
DRG: 746 | End: 2023-03-23
Payer: MEDICARE

## 2023-03-23 ENCOUNTER — ANESTHESIA EVENT (OUTPATIENT)
Dept: SURGERY | Facility: CLINIC | Age: 83
DRG: 746 | End: 2023-03-23
Payer: MEDICARE

## 2023-03-23 ENCOUNTER — HOSPITAL ENCOUNTER (INPATIENT)
Facility: CLINIC | Age: 83
LOS: 1 days | Discharge: HOME | DRG: 746 | End: 2023-03-24
Attending: OBSTETRICS & GYNECOLOGY | Admitting: OBSTETRICS & GYNECOLOGY
Payer: MEDICARE

## 2023-03-23 DIAGNOSIS — Z98.890 POST-OPERATIVE STATE: Primary | ICD-10-CM

## 2023-03-23 PROBLEM — Z90.710 S/P HYSTERECTOMY: Status: ACTIVE | Noted: 2023-03-23

## 2023-03-23 PROCEDURE — 250N000009 HC RX 250: Performed by: NURSE ANESTHETIST, CERTIFIED REGISTERED

## 2023-03-23 PROCEDURE — 0TJB8ZZ INSPECTION OF BLADDER, VIA NATURAL OR ARTIFICIAL OPENING ENDOSCOPIC: ICD-10-PCS | Performed by: OBSTETRICS & GYNECOLOGY

## 2023-03-23 PROCEDURE — 258N000003 HC RX IP 258 OP 636: Performed by: ANESTHESIOLOGY

## 2023-03-23 PROCEDURE — 710N000009 HC RECOVERY PHASE 1, LEVEL 1, PER MIN: Performed by: OBSTETRICS & GYNECOLOGY

## 2023-03-23 PROCEDURE — 250N000013 HC RX MED GY IP 250 OP 250 PS 637: Performed by: OBSTETRICS & GYNECOLOGY

## 2023-03-23 PROCEDURE — 250N000009 HC RX 250: Performed by: OBSTETRICS & GYNECOLOGY

## 2023-03-23 PROCEDURE — 250N000011 HC RX IP 250 OP 636: Performed by: OBSTETRICS & GYNECOLOGY

## 2023-03-23 PROCEDURE — C1771 REP DEV, URINARY, W/SLING: HCPCS | Performed by: OBSTETRICS & GYNECOLOGY

## 2023-03-23 PROCEDURE — 0TSC0ZZ REPOSITION BLADDER NECK, OPEN APPROACH: ICD-10-PCS | Performed by: OBSTETRICS & GYNECOLOGY

## 2023-03-23 PROCEDURE — 258N000003 HC RX IP 258 OP 636: Performed by: NURSE ANESTHETIST, CERTIFIED REGISTERED

## 2023-03-23 PROCEDURE — 250N000011 HC RX IP 250 OP 636: Performed by: ANESTHESIOLOGY

## 2023-03-23 PROCEDURE — 0TJ98ZZ INSPECTION OF URETER, VIA NATURAL OR ARTIFICIAL OPENING ENDOSCOPIC: ICD-10-PCS | Performed by: OBSTETRICS & GYNECOLOGY

## 2023-03-23 PROCEDURE — 250N000012 HC RX MED GY IP 250 OP 636 PS 637: Performed by: OBSTETRICS & GYNECOLOGY

## 2023-03-23 PROCEDURE — 272N000001 HC OR GENERAL SUPPLY STERILE: Performed by: OBSTETRICS & GYNECOLOGY

## 2023-03-23 PROCEDURE — 0UBM0ZZ EXCISION OF VULVA, OPEN APPROACH: ICD-10-PCS | Performed by: OBSTETRICS & GYNECOLOGY

## 2023-03-23 PROCEDURE — 999N000141 HC STATISTIC PRE-PROCEDURE NURSING ASSESSMENT: Performed by: OBSTETRICS & GYNECOLOGY

## 2023-03-23 PROCEDURE — 250N000025 HC SEVOFLURANE, PER MIN: Performed by: OBSTETRICS & GYNECOLOGY

## 2023-03-23 PROCEDURE — 250N000011 HC RX IP 250 OP 636: Performed by: NURSE ANESTHETIST, CERTIFIED REGISTERED

## 2023-03-23 PROCEDURE — 258N000001 HC RX 258: Performed by: OBSTETRICS & GYNECOLOGY

## 2023-03-23 PROCEDURE — 258N000003 HC RX IP 258 OP 636: Performed by: OBSTETRICS & GYNECOLOGY

## 2023-03-23 PROCEDURE — 360N000076 HC SURGERY LEVEL 3, PER MIN: Performed by: OBSTETRICS & GYNECOLOGY

## 2023-03-23 PROCEDURE — 120N000001 HC R&B MED SURG/OB

## 2023-03-23 PROCEDURE — 0KQM0ZZ REPAIR PERINEUM MUSCLE, OPEN APPROACH: ICD-10-PCS | Performed by: OBSTETRICS & GYNECOLOGY

## 2023-03-23 PROCEDURE — 370N000017 HC ANESTHESIA TECHNICAL FEE, PER MIN: Performed by: OBSTETRICS & GYNECOLOGY

## 2023-03-23 PROCEDURE — 0JQC0ZZ REPAIR PELVIC REGION SUBCUTANEOUS TISSUE AND FASCIA, OPEN APPROACH: ICD-10-PCS | Performed by: OBSTETRICS & GYNECOLOGY

## 2023-03-23 PROCEDURE — 0UBG0ZZ EXCISION OF VAGINA, OPEN APPROACH: ICD-10-PCS | Performed by: OBSTETRICS & GYNECOLOGY

## 2023-03-23 DEVICE — TRANSVAGINAL MID-URETHRAL SLING
Type: IMPLANTABLE DEVICE | Site: VAGINA | Status: FUNCTIONAL
Brand: ADVANTAGE FIT™ BLUE SYSTEM

## 2023-03-23 RX ORDER — PROCHLORPERAZINE 25 MG
12.5 SUPPOSITORY, RECTAL RECTAL EVERY 12 HOURS PRN
Status: DISCONTINUED | OUTPATIENT
Start: 2023-03-23 | End: 2023-03-24 | Stop reason: HOSPADM

## 2023-03-23 RX ORDER — ONDANSETRON 2 MG/ML
INJECTION INTRAMUSCULAR; INTRAVENOUS PRN
Status: DISCONTINUED | OUTPATIENT
Start: 2023-03-23 | End: 2023-03-23

## 2023-03-23 RX ORDER — LIDOCAINE HYDROCHLORIDE 20 MG/ML
INJECTION, SOLUTION INFILTRATION; PERINEURAL PRN
Status: DISCONTINUED | OUTPATIENT
Start: 2023-03-23 | End: 2023-03-23

## 2023-03-23 RX ORDER — CEFAZOLIN SODIUM/WATER 2 G/20 ML
2 SYRINGE (ML) INTRAVENOUS
Status: COMPLETED | OUTPATIENT
Start: 2023-03-23 | End: 2023-03-23

## 2023-03-23 RX ORDER — FENTANYL CITRATE 50 UG/ML
INJECTION, SOLUTION INTRAMUSCULAR; INTRAVENOUS PRN
Status: DISCONTINUED | OUTPATIENT
Start: 2023-03-23 | End: 2023-03-23

## 2023-03-23 RX ORDER — ONDANSETRON 4 MG/1
4 TABLET, ORALLY DISINTEGRATING ORAL EVERY 30 MIN PRN
Status: DISCONTINUED | OUTPATIENT
Start: 2023-03-23 | End: 2023-03-23 | Stop reason: HOSPADM

## 2023-03-23 RX ORDER — HYDROMORPHONE HCL IN WATER/PF 6 MG/30 ML
0.2 PATIENT CONTROLLED ANALGESIA SYRINGE INTRAVENOUS EVERY 5 MIN PRN
Status: DISCONTINUED | OUTPATIENT
Start: 2023-03-23 | End: 2023-03-23 | Stop reason: HOSPADM

## 2023-03-23 RX ORDER — GINSENG 100 MG
CAPSULE ORAL PRN
Status: DISCONTINUED | OUTPATIENT
Start: 2023-03-23 | End: 2023-03-23 | Stop reason: HOSPADM

## 2023-03-23 RX ORDER — HYDROMORPHONE HYDROCHLORIDE 2 MG/1
4 TABLET ORAL EVERY 4 HOURS PRN
Status: DISCONTINUED | OUTPATIENT
Start: 2023-03-23 | End: 2023-03-24 | Stop reason: HOSPADM

## 2023-03-23 RX ORDER — HYDROMORPHONE HCL IN WATER/PF 6 MG/30 ML
0.2 PATIENT CONTROLLED ANALGESIA SYRINGE INTRAVENOUS
Status: DISCONTINUED | OUTPATIENT
Start: 2023-03-23 | End: 2023-03-24 | Stop reason: HOSPADM

## 2023-03-23 RX ORDER — FENTANYL CITRATE 50 UG/ML
50 INJECTION, SOLUTION INTRAMUSCULAR; INTRAVENOUS EVERY 5 MIN PRN
Status: DISCONTINUED | OUTPATIENT
Start: 2023-03-23 | End: 2023-03-23 | Stop reason: HOSPADM

## 2023-03-23 RX ORDER — ONDANSETRON 4 MG/1
4 TABLET, ORALLY DISINTEGRATING ORAL EVERY 6 HOURS PRN
Status: DISCONTINUED | OUTPATIENT
Start: 2023-03-23 | End: 2023-03-24 | Stop reason: HOSPADM

## 2023-03-23 RX ORDER — NALOXONE HYDROCHLORIDE 0.4 MG/ML
0.2 INJECTION, SOLUTION INTRAMUSCULAR; INTRAVENOUS; SUBCUTANEOUS
Status: DISCONTINUED | OUTPATIENT
Start: 2023-03-23 | End: 2023-03-24 | Stop reason: HOSPADM

## 2023-03-23 RX ORDER — ALBUTEROL SULFATE 0.83 MG/ML
2.5 SOLUTION RESPIRATORY (INHALATION) EVERY 4 HOURS PRN
Status: DISCONTINUED | OUTPATIENT
Start: 2023-03-23 | End: 2023-03-23 | Stop reason: HOSPADM

## 2023-03-23 RX ORDER — SODIUM CHLORIDE 9 MG/ML
INJECTION, SOLUTION INTRAVENOUS CONTINUOUS
Status: DISCONTINUED | OUTPATIENT
Start: 2023-03-23 | End: 2023-03-24 | Stop reason: HOSPADM

## 2023-03-23 RX ORDER — GLYCOPYRROLATE 0.2 MG/ML
INJECTION, SOLUTION INTRAMUSCULAR; INTRAVENOUS PRN
Status: DISCONTINUED | OUTPATIENT
Start: 2023-03-23 | End: 2023-03-23

## 2023-03-23 RX ORDER — HYDROMORPHONE HCL IN WATER/PF 6 MG/30 ML
0.4 PATIENT CONTROLLED ANALGESIA SYRINGE INTRAVENOUS EVERY 5 MIN PRN
Status: DISCONTINUED | OUTPATIENT
Start: 2023-03-23 | End: 2023-03-23 | Stop reason: HOSPADM

## 2023-03-23 RX ORDER — HYDROMORPHONE HYDROCHLORIDE 2 MG/1
2 TABLET ORAL EVERY 4 HOURS PRN
Status: DISCONTINUED | OUTPATIENT
Start: 2023-03-23 | End: 2023-03-24 | Stop reason: HOSPADM

## 2023-03-23 RX ORDER — DIMENHYDRINATE 50 MG/ML
25 INJECTION, SOLUTION INTRAMUSCULAR; INTRAVENOUS
Status: DISCONTINUED | OUTPATIENT
Start: 2023-03-23 | End: 2023-03-23 | Stop reason: HOSPADM

## 2023-03-23 RX ORDER — SODIUM CHLORIDE, SODIUM LACTATE, POTASSIUM CHLORIDE, CALCIUM CHLORIDE 600; 310; 30; 20 MG/100ML; MG/100ML; MG/100ML; MG/100ML
INJECTION, SOLUTION INTRAVENOUS CONTINUOUS
Status: DISCONTINUED | OUTPATIENT
Start: 2023-03-23 | End: 2023-03-23 | Stop reason: HOSPADM

## 2023-03-23 RX ORDER — DIPHENHYDRAMINE HCL 12.5MG/5ML
12.5 LIQUID (ML) ORAL EVERY 6 HOURS PRN
Status: DISCONTINUED | OUTPATIENT
Start: 2023-03-23 | End: 2023-03-23 | Stop reason: HOSPADM

## 2023-03-23 RX ORDER — LIDOCAINE 40 MG/G
CREAM TOPICAL
Status: DISCONTINUED | OUTPATIENT
Start: 2023-03-23 | End: 2023-03-23 | Stop reason: HOSPADM

## 2023-03-23 RX ORDER — CEFAZOLIN SODIUM/WATER 2 G/20 ML
2 SYRINGE (ML) INTRAVENOUS SEE ADMIN INSTRUCTIONS
Status: DISCONTINUED | OUTPATIENT
Start: 2023-03-23 | End: 2023-03-23 | Stop reason: HOSPADM

## 2023-03-23 RX ORDER — ACETAMINOPHEN 325 MG/1
975 TABLET ORAL ONCE
Status: COMPLETED | OUTPATIENT
Start: 2023-03-23 | End: 2023-03-23

## 2023-03-23 RX ORDER — PHENAZOPYRIDINE HYDROCHLORIDE 200 MG/1
200 TABLET, FILM COATED ORAL ONCE
Status: COMPLETED | OUTPATIENT
Start: 2023-03-23 | End: 2023-03-23

## 2023-03-23 RX ORDER — POLYETHYLENE GLYCOL 3350 17 G/17G
17 POWDER, FOR SOLUTION ORAL DAILY
Status: DISCONTINUED | OUTPATIENT
Start: 2023-03-23 | End: 2023-03-24 | Stop reason: HOSPADM

## 2023-03-23 RX ORDER — ONDANSETRON 2 MG/ML
4 INJECTION INTRAMUSCULAR; INTRAVENOUS EVERY 30 MIN PRN
Status: DISCONTINUED | OUTPATIENT
Start: 2023-03-23 | End: 2023-03-23 | Stop reason: HOSPADM

## 2023-03-23 RX ORDER — PROPOFOL 10 MG/ML
INJECTION, EMULSION INTRAVENOUS PRN
Status: DISCONTINUED | OUTPATIENT
Start: 2023-03-23 | End: 2023-03-23

## 2023-03-23 RX ORDER — DEXAMETHASONE SODIUM PHOSPHATE 4 MG/ML
INJECTION, SOLUTION INTRA-ARTICULAR; INTRALESIONAL; INTRAMUSCULAR; INTRAVENOUS; SOFT TISSUE PRN
Status: DISCONTINUED | OUTPATIENT
Start: 2023-03-23 | End: 2023-03-23

## 2023-03-23 RX ORDER — ASPIRIN 81 MG/1
81 TABLET ORAL DAILY
Status: DISCONTINUED | OUTPATIENT
Start: 2023-03-23 | End: 2023-03-24 | Stop reason: HOSPADM

## 2023-03-23 RX ORDER — LABETALOL HYDROCHLORIDE 5 MG/ML
10 INJECTION, SOLUTION INTRAVENOUS
Status: DISCONTINUED | OUTPATIENT
Start: 2023-03-23 | End: 2023-03-23 | Stop reason: HOSPADM

## 2023-03-23 RX ORDER — HALOPERIDOL 5 MG/ML
1 INJECTION INTRAMUSCULAR
Status: DISCONTINUED | OUTPATIENT
Start: 2023-03-23 | End: 2023-03-23 | Stop reason: HOSPADM

## 2023-03-23 RX ORDER — KETOROLAC TROMETHAMINE 15 MG/ML
15 INJECTION, SOLUTION INTRAMUSCULAR; INTRAVENOUS EVERY 6 HOURS
Status: COMPLETED | OUTPATIENT
Start: 2023-03-23 | End: 2023-03-24

## 2023-03-23 RX ORDER — MYCOPHENOLATE MOFETIL 250 MG/1
500 CAPSULE ORAL 2 TIMES DAILY
Status: DISCONTINUED | OUTPATIENT
Start: 2023-03-23 | End: 2023-03-24 | Stop reason: HOSPADM

## 2023-03-23 RX ORDER — KETOROLAC TROMETHAMINE 30 MG/ML
INJECTION, SOLUTION INTRAMUSCULAR; INTRAVENOUS PRN
Status: DISCONTINUED | OUTPATIENT
Start: 2023-03-23 | End: 2023-03-23

## 2023-03-23 RX ORDER — ONDANSETRON 2 MG/ML
4 INJECTION INTRAMUSCULAR; INTRAVENOUS EVERY 6 HOURS PRN
Status: DISCONTINUED | OUTPATIENT
Start: 2023-03-23 | End: 2023-03-24 | Stop reason: HOSPADM

## 2023-03-23 RX ORDER — SODIUM CHLORIDE, SODIUM LACTATE, POTASSIUM CHLORIDE, CALCIUM CHLORIDE 600; 310; 30; 20 MG/100ML; MG/100ML; MG/100ML; MG/100ML
INJECTION, SOLUTION INTRAVENOUS CONTINUOUS PRN
Status: DISCONTINUED | OUTPATIENT
Start: 2023-03-23 | End: 2023-03-23

## 2023-03-23 RX ORDER — HYDRALAZINE HYDROCHLORIDE 20 MG/ML
2.5-5 INJECTION INTRAMUSCULAR; INTRAVENOUS EVERY 10 MIN PRN
Status: DISCONTINUED | OUTPATIENT
Start: 2023-03-23 | End: 2023-03-23 | Stop reason: HOSPADM

## 2023-03-23 RX ORDER — DIPHENHYDRAMINE HYDROCHLORIDE 50 MG/ML
12.5 INJECTION INTRAMUSCULAR; INTRAVENOUS EVERY 6 HOURS PRN
Status: DISCONTINUED | OUTPATIENT
Start: 2023-03-23 | End: 2023-03-23 | Stop reason: HOSPADM

## 2023-03-23 RX ORDER — PROPOFOL 10 MG/ML
INJECTION, EMULSION INTRAVENOUS CONTINUOUS PRN
Status: DISCONTINUED | OUTPATIENT
Start: 2023-03-23 | End: 2023-03-23

## 2023-03-23 RX ORDER — PREDNISONE 5 MG/1
5 TABLET ORAL DAILY
Status: DISCONTINUED | OUTPATIENT
Start: 2023-03-23 | End: 2023-03-24 | Stop reason: HOSPADM

## 2023-03-23 RX ORDER — PROCHLORPERAZINE MALEATE 5 MG
5 TABLET ORAL EVERY 6 HOURS PRN
Status: DISCONTINUED | OUTPATIENT
Start: 2023-03-23 | End: 2023-03-24 | Stop reason: HOSPADM

## 2023-03-23 RX ORDER — FENTANYL CITRATE 50 UG/ML
25 INJECTION, SOLUTION INTRAMUSCULAR; INTRAVENOUS EVERY 5 MIN PRN
Status: DISCONTINUED | OUTPATIENT
Start: 2023-03-23 | End: 2023-03-23 | Stop reason: HOSPADM

## 2023-03-23 RX ORDER — ACETAMINOPHEN 325 MG/1
650 TABLET ORAL EVERY 6 HOURS
Status: DISCONTINUED | OUTPATIENT
Start: 2023-03-23 | End: 2023-03-24 | Stop reason: HOSPADM

## 2023-03-23 RX ORDER — ACETAMINOPHEN 325 MG/1
975 TABLET ORAL ONCE
Status: DISCONTINUED | OUTPATIENT
Start: 2023-03-23 | End: 2023-03-23 | Stop reason: HOSPADM

## 2023-03-23 RX ORDER — NALOXONE HYDROCHLORIDE 0.4 MG/ML
0.4 INJECTION, SOLUTION INTRAMUSCULAR; INTRAVENOUS; SUBCUTANEOUS
Status: DISCONTINUED | OUTPATIENT
Start: 2023-03-23 | End: 2023-03-24 | Stop reason: HOSPADM

## 2023-03-23 RX ORDER — DIAZEPAM 10 MG/2ML
2.5 INJECTION, SOLUTION INTRAMUSCULAR; INTRAVENOUS
Status: DISCONTINUED | OUTPATIENT
Start: 2023-03-23 | End: 2023-03-23 | Stop reason: HOSPADM

## 2023-03-23 RX ORDER — HYDROMORPHONE HCL IN WATER/PF 6 MG/30 ML
0.4 PATIENT CONTROLLED ANALGESIA SYRINGE INTRAVENOUS
Status: DISCONTINUED | OUTPATIENT
Start: 2023-03-23 | End: 2023-03-24 | Stop reason: HOSPADM

## 2023-03-23 RX ADMIN — GLYCOPYRROLATE 0.2 MG: 0.2 INJECTION, SOLUTION INTRAMUSCULAR; INTRAVENOUS at 10:19

## 2023-03-23 RX ADMIN — PHENAZOPYRIDINE 200 MG: 200 TABLET ORAL at 08:10

## 2023-03-23 RX ADMIN — SODIUM CHLORIDE, SODIUM LACTATE, POTASSIUM CHLORIDE, CALCIUM CHLORIDE: 600; 310; 30; 20 INJECTION, SOLUTION INTRAVENOUS at 10:21

## 2023-03-23 RX ADMIN — PHENYLEPHRINE HYDROCHLORIDE 100 MCG: 10 INJECTION INTRAVENOUS at 11:10

## 2023-03-23 RX ADMIN — ACETAMINOPHEN 975 MG: 325 TABLET ORAL at 08:10

## 2023-03-23 RX ADMIN — FENTANYL CITRATE 25 MCG: 50 INJECTION, SOLUTION INTRAMUSCULAR; INTRAVENOUS at 12:44

## 2023-03-23 RX ADMIN — SODIUM CHLORIDE, POTASSIUM CHLORIDE, SODIUM LACTATE AND CALCIUM CHLORIDE: 600; 310; 30; 20 INJECTION, SOLUTION INTRAVENOUS at 09:00

## 2023-03-23 RX ADMIN — POLYETHYLENE GLYCOL 3350 17 G: 17 POWDER, FOR SOLUTION ORAL at 15:41

## 2023-03-23 RX ADMIN — Medication 2 G: at 09:02

## 2023-03-23 RX ADMIN — SODIUM CHLORIDE: 9 INJECTION, SOLUTION INTRAVENOUS at 15:40

## 2023-03-23 RX ADMIN — PROCHLORPERAZINE EDISYLATE 5 MG: 5 INJECTION INTRAMUSCULAR; INTRAVENOUS at 12:43

## 2023-03-23 RX ADMIN — MYCOPHENOLATE MOFETIL 500 MG: 250 CAPSULE ORAL at 20:22

## 2023-03-23 RX ADMIN — SODIUM CHLORIDE, SODIUM LACTATE, POTASSIUM CHLORIDE, CALCIUM CHLORIDE: 600; 310; 30; 20 INJECTION, SOLUTION INTRAVENOUS at 08:12

## 2023-03-23 RX ADMIN — KETOROLAC TROMETHAMINE 15 MG: 15 INJECTION, SOLUTION INTRAMUSCULAR; INTRAVENOUS at 15:41

## 2023-03-23 RX ADMIN — TAZOBACTAM SODIUM AND PIPERACILLIN SODIUM 3.38 G: 375; 3 INJECTION, SOLUTION INTRAVENOUS at 20:33

## 2023-03-23 RX ADMIN — ACETAMINOPHEN 650 MG: 325 TABLET ORAL at 20:21

## 2023-03-23 RX ADMIN — PROPOFOL 100 MG: 10 INJECTION, EMULSION INTRAVENOUS at 09:06

## 2023-03-23 RX ADMIN — TAZOBACTAM SODIUM AND PIPERACILLIN SODIUM 3.38 G: 375; 3 INJECTION, SOLUTION INTRAVENOUS at 15:41

## 2023-03-23 RX ADMIN — FENTANYL CITRATE 100 MCG: 50 INJECTION, SOLUTION INTRAMUSCULAR; INTRAVENOUS at 09:06

## 2023-03-23 RX ADMIN — LIDOCAINE HYDROCHLORIDE 30 MG: 20 INJECTION, SOLUTION INFILTRATION; PERINEURAL at 09:06

## 2023-03-23 RX ADMIN — PHENYLEPHRINE HYDROCHLORIDE 150 MCG: 10 INJECTION INTRAVENOUS at 09:42

## 2023-03-23 RX ADMIN — PHENYLEPHRINE HYDROCHLORIDE 100 MCG: 10 INJECTION INTRAVENOUS at 10:19

## 2023-03-23 RX ADMIN — ONDANSETRON 4 MG: 2 INJECTION INTRAMUSCULAR; INTRAVENOUS at 11:47

## 2023-03-23 RX ADMIN — ACETAMINOPHEN 650 MG: 325 TABLET ORAL at 15:41

## 2023-03-23 RX ADMIN — ONDANSETRON 4 MG: 2 INJECTION INTRAMUSCULAR; INTRAVENOUS at 09:06

## 2023-03-23 RX ADMIN — SODIUM CHLORIDE, POTASSIUM CHLORIDE, SODIUM LACTATE AND CALCIUM CHLORIDE: 600; 310; 30; 20 INJECTION, SOLUTION INTRAVENOUS at 13:16

## 2023-03-23 RX ADMIN — PROPOFOL 50 MCG/KG/MIN: 10 INJECTION, EMULSION INTRAVENOUS at 09:06

## 2023-03-23 RX ADMIN — ASPIRIN 81 MG: 81 TABLET, COATED ORAL at 15:41

## 2023-03-23 RX ADMIN — DEXAMETHASONE SODIUM PHOSPHATE 4 MG: 4 INJECTION, SOLUTION INTRA-ARTICULAR; INTRALESIONAL; INTRAMUSCULAR; INTRAVENOUS; SOFT TISSUE at 09:06

## 2023-03-23 RX ADMIN — KETOROLAC TROMETHAMINE 15 MG: 30 INJECTION, SOLUTION INTRAMUSCULAR at 09:06

## 2023-03-23 RX ADMIN — KETOROLAC TROMETHAMINE 15 MG: 15 INJECTION, SOLUTION INTRAMUSCULAR; INTRAVENOUS at 20:31

## 2023-03-23 RX ADMIN — PHENYLEPHRINE HYDROCHLORIDE 100 MCG: 10 INJECTION INTRAVENOUS at 10:58

## 2023-03-23 ASSESSMENT — ACTIVITIES OF DAILY LIVING (ADL)
ADLS_ACUITY_SCORE: 28
ADLS_ACUITY_SCORE: 24
ADLS_ACUITY_SCORE: 22
ADLS_ACUITY_SCORE: 22
ADLS_ACUITY_SCORE: 28
ADLS_ACUITY_SCORE: 24
ADLS_ACUITY_SCORE: 28
ADLS_ACUITY_SCORE: 20
ADLS_ACUITY_SCORE: 24

## 2023-03-23 NOTE — PROGRESS NOTES
I spoke with Dr. Huizar, he gave permission for the patient to take prednisone and cellcept (anti rejection) medication pre op at 8 before the procedure.    Pharmacist Jhony confirmed that our hospital pharmacy carries all of the patient's medications in stock for the patient's observation. Ari will be taking home all of the medications and the patient will take our stock from here on out. Patient and  verbalized understanding of these instructions.     Tana Hernandez RN on 3/23/2023 at 8:05 AM

## 2023-03-23 NOTE — OP NOTE
OPERATIVE REPORT      PATIENT: Maria E Webster  MR [id number]   1940     DATE OF OPERATION: 2023     SURGEON: Guerita Aragon MD      PREOPERATIVE DIAGNOSIS:   1. Post-hysterectomy vaginal prolapse  2. Associated cystocele, rectocele, and enterocele.  3. Stress urinary incontinence   4. Vulvar cysts     POSTOP DIAGNOSIS:   1. Post-hysterectomy vaginal prolapse  2. Associated cystocele, rectocele, and enterocele.    3. Stress urinary incontinence   4. Vulvar cysts     PROCEDURES :   1. Complete colpectomy  2. Anterior repair  2. Enterocele repair  3. Posterior colporrhaphy  4. Levator myorrhaphy  5. Perineorrhaphy   6. Retropubic midurethral sling  7. Cystourethroscopy   8. Excision of 4 vulvar cysts (4 mm, 4 mm, 6 mm, 10 mm)    ASSISTANT:   BECCA Crawford      ANESTHESIA:   GET     ESTIMATED BLOOD LOSS:   100 ml      IV FLUIDS:   1300 ml of crystalloid.      COMPLICATIONS:   None     DRAINS:   16-German Jenkins catheter to gravity drainage      PACKING:   None     FINDINGS:     No bladder lesion or injury, native ureteral orifices are present but not excreting urine. The transplanted kidney ureter located anterior left dome had good urine efflux.    Normal rectal exam at conclusion of surgery      INDICATIONS:   This patient was seen in consultation regarding vaginal/pelvic organ prolapse and incontinence. Please refer to her clinic documentation for a complete description of her evaluation treatment plan she was desirous of a definitive surgical approach. Prior to the procedure, the risks, benefits, indications, and alternatives were discussed. Both written and verbal consent were obtained.      PROCEDURE:   The patient was brought to the operating suite. She was administered prophylactic IV antibiotics, had sequential compression devices present on her lower extremities. She was placed in the supine position and administered general anesthesia. She was now carefully positioned in the dorsal  lithotomy position with her legs stationed in [facility] stir[facility]ups with attention to avoiding pressure points. An exam under anesthesia was performed, which noted post-hysterectomy vagina with prominent enterocele defect, no adnexal or parametrial masses. Anorectal exam normal.  She was now sterilely prepped and draped in the usual fashion. Her bladder was drained with  straight catheterization.      Complete Colpectomy.   Her vaginal cuff was grasped with an Allis clamp at each corner. The vaginal tissue was injected with local anesthetic solution to assist with hydro-dissection. The vaginal epithelium was carefully dissected off the underlying perivesical/perirectal tissue and the parietal peritoneum at the apex of the enterocele. The dissection encompassed the span of tissue from the bladder neck to the vaginal cuff and out laterally to the arcus tendineus fascia pelvis. The posterior dissection was performed from the perineal body to the vaginal cuff and out laterally to the levator muscle insertion points.     Anterior Repair.  The jules-vesical connective tissue was plicated, using 0-vicryl suture, to the midline to provide anterior support.     Retropubic midurethral sling.  Two marks were created on the anterior abdominal wall 2.5 cm lateral to midline at the level of the pubic bone. Attention was turned to the vagina, where an Allis clamp was applied 1 cm distal from the meatus, an additional Allis clamp 2.5 cm from the meatus. The periurethral tissue was injected with a solution of Marcaine with epinephrine. A 1.5 cm incision was created between the 2 Allis clamps beneath the mid urethra in the sagittal plane. Two periurethral tunnels were then created out laterally towards the pubic bone. Once an adequate dissection had been performed, the Advantage Fit device ([employer]) was selected and loaded. The trocar was brought through the patient's left periurethral tunnel, then back behind the pubic  bone, through the space of Retzius, and out through the previously created jayesh on the anterior abdominal wall. The blue stay sheath was left in place.This was repeated in a similar fashion on the patient's right side. The urethra was diverted in ipsilateral fashion during trocar placement.  Cystourethroscopy was now repeated with continued normal findings. The cystoscope was removed. The sling material was appropriately positioned beneath the mid urethra with no overt tension. The resultant incision was closed with a running locking suture of 2-0 Vicryl.     Enterocele repair and  Posterior colporrhaphy.   The enterocele at the apex of the vagina was obliterated using serial 0-vicryl suture in a circumferential and A/P orientation.  Multiple layers of repair/reconstruction were placed to reduce and support the enterocele defect.  The large posterior rectocele defect was now imbricated in the midline using interrupted sutures of 0-Vicryl sequentially, layering the repair in the caudad/caphalad plane and the horizontal plane with multiple layers of tissue plication.     Levator Myorrhaphy and Perineorrhaphy  A further dissection was taken out laterally in order to mobilize the levator musculature to allow for a midline plication and closure of the genital hiatus. This was performed with interrupted sutures of 0-Vicryl. The result of the repair was a 1 cm genital hiatus. The posterior repair and perineal body skin was reapproximated with a running locking suture of 2-0 Vicryl internally and a subcuticular suture of the perineal body skin externally.      Excision of 4 vulvar cysts.   Two were located on the right majora, two on the left majora. The base of each lesion was injected with local anesthetic. A scalpel was used to incise the skin overlying each lesion. The contents of these inclusion cysts including the cyst wall were resected. Each area of excision, the skin was re-approximated using a 4-0 vicryl skin  suture.    Anorectal exam showed there to be no injuries or other abnormalities. She had a 16-Mongolian Jenkins catheter present to gravity drainage. Sponge, lap, and needle counts were found be correct. There were no complications from surgery. The patient was awoken from anesthesia and brought to the recovery room in excellent condition.

## 2023-03-23 NOTE — PROGRESS NOTES
Notified MD at 1300 PM regarding low BP 70s/40s.      Spoke with: Dr Huizar    Orders were not obtained.    Comments: give 500cc bolus and see how she does

## 2023-03-23 NOTE — PROGRESS NOTES
ROOM # 208-2    Living Situation (if not independent, order SW consult):  Facility name:Home with   :  [place]    Activity level at baseline: Independent  Activity level on admit: SBA    Who will be transporting you at discharge:     Patient registered to observation; given Patient Bill of Rights; given the opportunity to ask questions about observation status and their plan of care.  Patient has been oriented to the observation room, bathroom and call light is in place.    Discussed discharge goals and expectations with patient/family.

## 2023-03-23 NOTE — ANESTHESIA PREPROCEDURE EVALUATION
Anesthesia Pre-Procedure Evaluation    Patient: Maria E Webster   MRN: [id number] : 1940        Procedure : Procedure(s):  Complete Colpectomy, Anterior/Posterior Repair, Enterocele Repair, Levator Pelvic Floor Muscle Plication, Perineorrhaphy  Possible Retropubic Midurethral Sling and Cystoscopy          Past Medical History:   Diagnosis Date     Arthritis      Diverticulitis of small intestine with perforation 01/10/2022     Diverticulosis of colon (without mention of hemorrhage)      Hernia, abdominal      Polycystic kidney, unspecified type     Kidney transplant     PONV (postoperative nausea and vomiting)      Rectocele      Unspecified essential hypertension       Past Surgical History:   Procedure Laterality Date     CYSTOSCOPY       HC CORRECT BUNION,SIMPLE       HEMORRHOIDECTOMY       HERNIA REPAIR       TYMPANOPLASTY  2012    Procedure:TYMPANOPLASTY; Left Tympanoplasty ; Surgeon:BROOKE SANCHEZ; Location: OR     [facility] TRANSPLANTATION OF KIDNEY      left     Z VAGINAL HYSTERECTOMY      prolapse uterus, bilat. S&O     ZZHC COLONOSCOPY THRU STOMA, DIAGNOSTIC  2003    few diverticulae      Allergies   Allergen Reactions     Codeine Nausea and Vomiting     Fentanyl Nausea and Nausea and Vomiting     Morphine Nausea and Vomiting     Oxycodone-Acetaminophen Nausea and Nausea and Vomiting     Okay with plain oxycodone at low doses          Social History     Tobacco Use     Smoking status: Never     Smokeless tobacco: Never   Substance Use Topics     Alcohol use: Yes     Comment: 0-1 per week      Wt Readings from Last 1 Encounters:   23 50.4 kg (111 lb 3.2 oz)        Anesthesia Evaluation   Pt has had prior anesthetic. Type: General.    History of anesthetic complications  - PONV.      ROS/MED HX  ENT/Pulmonary:  - neg pulmonary ROS     Neurologic:  - neg neurologic ROS     Cardiovascular:     (+) hypertension-----    METS/Exercise Tolerance:     Hematologic:  - neg hematologic   ROS     Musculoskeletal:   (+) arthritis,     GI/Hepatic:  - neg GI/hepatic ROS     Renal/Genitourinary:     (+) renal disease, type: ESRD, Pt does not require dialysis, Pt has history of transplant,     Endo:  - neg endo ROS     Psychiatric/Substance Use:  - neg psychiatric ROS     Infectious Disease:  - neg infectious disease ROS     Malignancy:  - neg malignancy ROS     Other:  - neg other ROS          Physical Exam    Airway        Mallampati: II   TM distance: > 3 FB   Neck ROM: full   Mouth opening: > 3 cm    Respiratory Devices and Support         Dental       (+) Modest Abnormalities - crowns, retainers, 1 or 2 missing teeth      Cardiovascular   cardiovascular exam normal       Rhythm and rate: regular and normal     Pulmonary   pulmonary exam normal        breath sounds clear to auscultation   (-) no rhonchi    Other findings: Lab Test        01/13/22 01/11/22 01/09/22                       0708          1125          2049          WBC          8.3          8.4          6.7           HGB          11.9         11.9         12.2          MCV          92           92           93            PLT          256          230          276            Lab Test        01/13/22 01/11/22 01/09/22                       0708          1125          2049          NA           134          137          130*          POTASSIUM    3.5          3.4          3.7           CHLORIDE     105          107          99            CO2          22           24           25            BUN          12           16           35*           CR           0.58         0.55         0.62          ANIONGAP     7            6            6             BERNARDA          8.0*         8.0*         8.6           GLC          80           102*         142*                 EKG Interpretation: NSR with diffuse ST and T wave flattening    OUTSIDE LABS:  CBC:   Lab Results   Component Value Date    WBC 8.3 01/13/2022    WBC 8.4 01/11/2022    HGB  11.9 01/13/2022    HGB 11.9 01/11/2022    HCT 37.4 01/13/2022    HCT 36.6 01/11/2022     01/13/2022     01/11/2022     BMP:   Lab Results   Component Value Date     01/13/2022     01/11/2022    POTASSIUM 3.5 01/13/2022    POTASSIUM 3.4 01/11/2022    CHLORIDE 105 01/13/2022    CHLORIDE 107 01/11/2022    CO2 22 01/13/2022    CO2 24 01/11/2022    BUN 12 01/13/2022    BUN 16 01/11/2022    CR 0.58 01/13/2022    CR 0.55 01/11/2022    GLC 80 01/13/2022     (H) 01/11/2022     COAGS: No results found for: PTT, INR, FIBR  POC: No results found for: BGM, HCG, HCGS  HEPATIC:   Lab Results   Component Value Date    ALBUMIN 3.6 01/09/2022    PROTTOTAL 7.1 01/09/2022    ALT 26 01/09/2022    AST 15 01/09/2022    ALKPHOS 51 01/09/2022    BILITOTAL 0.4 01/09/2022     OTHER:   Lab Results   Component Value Date    LACT 1.5 01/09/2022    BERNARDA 8.0 (L) 01/13/2022    LIPASE 243 01/09/2022       Anesthesia Plan    ASA Status:  3      Anesthesia Type: General.     - Airway: LMA   Induction: Intravenous.   Maintenance: Balanced.        Consents    Anesthesia Plan(s) and associated risks, benefits, and realistic alternatives discussed. Questions answered and patient/representative(s) expressed understanding.     - Discussed: Risks, Benefits and Alternatives for BOTH SEDATION and the PROCEDURE were discussed     - Discussed with:  Patient      - Extended Intubation/Ventilatory Support Discussed: No.      - Patient is DNR/DNI Status: No    Use of blood products discussed: No .     Postoperative Care    Pain management: IV analgesics, Oral pain medications, Multi-modal analgesia.   PONV prophylaxis: Ondansetron (or other 5HT-3), Dexamethasone or Solumedrol, Background Propofol Infusion     Comments:                Toy Huizar MD

## 2023-03-23 NOTE — PROGRESS NOTES
SPIRITUAL HEALTH SERVICES  [facility]. Pre-Op  PRE-SURGERY VISIT    Had pre-surgery visit with Maria E and her .  Provided spiritual support, prayer.     Katrina Blanco MDiv  Staff   [facility] routine referrals *[phone]  [facility] available 24/7 for emergent requests/referrals, either by having the on-call  paged or by entering an ASAP/STAT consult in Epic (this will also page the on-call ).

## 2023-03-23 NOTE — ANESTHESIA CARE TRANSFER NOTE
Patient: Maria E Webster    Procedure: Procedure(s):  Complete Colpectomy, Anterior/Posterior Repair, Enterocele Repair, Levator Pelvic Floor Muscle Plication, Perineorrhaphy, Excision of vulvar cysts  Retropubic Midurethral Sling and Cystoscopy       Diagnosis: Prolapse, post-hysterectomy [N99.3]  Enterocele [K46.9]  Stress incontinence [N39.3]  Diagnosis Additional Information: No value filed.    Anesthesia Type:   General     Note:    Oropharynx: oropharynx clear of all foreign objects and spontaneously breathing  Level of Consciousness: awake and drowsy  Oxygen Supplementation: face mask  Level of Supplemental Oxygen (L/min / FiO2): 6  Independent Airway: airway patency satisfactory and stable  Dentition: dentition unchanged  Vital Signs Stable: post-procedure vital signs reviewed and stable  Report to RN Given: handoff report given  Patient transferred to: PACU  Comments: No issues,comfortable  Handoff Report: Identifed the Patient, Identified the Reponsible Provider, Reviewed the pertinent medical history, Discussed the surgical course, Reviewed Intra-OP anesthesia mangement and issues during anesthesia and Allowed opportunity for questions and acknowledgement of understanding      Vitals:  Vitals Value Taken Time   /64 03/23/23 1135   Temp     Pulse 77 03/23/23 1136   Resp 13 03/23/23 1136   SpO2 100 % 03/23/23 1136   Vitals shown include unvalidated device data.    Electronically Signed By: ARVIND Stanley CRNA  March 23, 2023  11:37 AM

## 2023-03-24 VITALS
SYSTOLIC BLOOD PRESSURE: 129 MMHG | HEART RATE: 67 BPM | DIASTOLIC BLOOD PRESSURE: 59 MMHG | WEIGHT: 119.3 LBS | RESPIRATION RATE: 18 BRPM | TEMPERATURE: 98.4 F | OXYGEN SATURATION: 95 % | HEIGHT: 59 IN | BODY MASS INDEX: 24.05 KG/M2

## 2023-03-24 LAB — HGB BLD-MCNC: 10.4 G/DL (ref 11.7–15.7)

## 2023-03-24 PROCEDURE — 258N000003 HC RX IP 258 OP 636: Performed by: OBSTETRICS & GYNECOLOGY

## 2023-03-24 PROCEDURE — 250N000011 HC RX IP 250 OP 636: Performed by: OBSTETRICS & GYNECOLOGY

## 2023-03-24 PROCEDURE — 36415 COLL VENOUS BLD VENIPUNCTURE: CPT | Performed by: OBSTETRICS & GYNECOLOGY

## 2023-03-24 PROCEDURE — 250N000013 HC RX MED GY IP 250 OP 250 PS 637: Performed by: OBSTETRICS & GYNECOLOGY

## 2023-03-24 PROCEDURE — 250N000012 HC RX MED GY IP 250 OP 636 PS 637: Performed by: OBSTETRICS & GYNECOLOGY

## 2023-03-24 PROCEDURE — 85018 HEMOGLOBIN: CPT | Performed by: OBSTETRICS & GYNECOLOGY

## 2023-03-24 RX ORDER — CIPROFLOXACIN 250 MG/1
250 TABLET, FILM COATED ORAL 2 TIMES DAILY
Qty: 14 TABLET | Refills: 0 | Status: SHIPPED | OUTPATIENT
Start: 2023-03-24 | End: 2023-03-31

## 2023-03-24 RX ORDER — POLYETHYLENE GLYCOL 3350 17 G/17G
17 POWDER, FOR SOLUTION ORAL DAILY
Qty: 510 G | Refills: 0 | Status: SHIPPED | OUTPATIENT
Start: 2023-03-24 | End: 2024-06-21

## 2023-03-24 RX ORDER — HYDROMORPHONE HYDROCHLORIDE 2 MG/1
2 TABLET ORAL EVERY 4 HOURS
Qty: 20 TABLET | Refills: 0 | Status: SHIPPED | OUTPATIENT
Start: 2023-03-24 | End: 2023-03-27

## 2023-03-24 RX ORDER — IBUPROFEN 600 MG/1
600 TABLET, FILM COATED ORAL EVERY 6 HOURS PRN
Qty: 30 TABLET | Refills: 0 | Status: SHIPPED | OUTPATIENT
Start: 2023-03-24 | End: 2024-03-22

## 2023-03-24 RX ADMIN — KETOROLAC TROMETHAMINE 15 MG: 15 INJECTION, SOLUTION INTRAMUSCULAR; INTRAVENOUS at 08:51

## 2023-03-24 RX ADMIN — ACETAMINOPHEN 650 MG: 325 TABLET ORAL at 02:56

## 2023-03-24 RX ADMIN — KETOROLAC TROMETHAMINE 15 MG: 15 INJECTION, SOLUTION INTRAMUSCULAR; INTRAVENOUS at 02:59

## 2023-03-24 RX ADMIN — ASPIRIN 81 MG: 81 TABLET, COATED ORAL at 08:51

## 2023-03-24 RX ADMIN — MYCOPHENOLATE MOFETIL 500 MG: 250 CAPSULE ORAL at 08:51

## 2023-03-24 RX ADMIN — TAZOBACTAM SODIUM AND PIPERACILLIN SODIUM 3.38 G: 375; 3 INJECTION, SOLUTION INTRAVENOUS at 09:21

## 2023-03-24 RX ADMIN — PREDNISONE 5 MG: 5 TABLET ORAL at 08:51

## 2023-03-24 RX ADMIN — POLYETHYLENE GLYCOL 3350 17 G: 17 POWDER, FOR SOLUTION ORAL at 08:51

## 2023-03-24 RX ADMIN — SODIUM CHLORIDE: 9 INJECTION, SOLUTION INTRAVENOUS at 02:54

## 2023-03-24 RX ADMIN — TAZOBACTAM SODIUM AND PIPERACILLIN SODIUM 3.38 G: 375; 3 INJECTION, SOLUTION INTRAVENOUS at 03:05

## 2023-03-24 RX ADMIN — ACETAMINOPHEN 650 MG: 325 TABLET ORAL at 08:51

## 2023-03-24 ASSESSMENT — ACTIVITIES OF DAILY LIVING (ADL)
ADLS_ACUITY_SCORE: 28

## 2023-03-24 NOTE — PROGRESS NOTES
"Patient's After Visit Summary was reviewed with patient and/or spouse.   Patient verbalized understanding of After Visit Summary, recommended follow up and was given an opportunity to ask questions.   Discharge medications sent home with patient/family: N/A paper prescription sent.  Discharged with spouse            /59 (BP Location: Left arm)   Pulse 67   Temp 98.4  F (36.9  C) (Oral)   Resp 18   Ht 1.499 m (4' 11\")   Wt 54.1 kg (119 lb 4.8 oz)   SpO2 95%   BMI 24.10 kg/m      "

## 2023-03-24 NOTE — PLAN OF CARE
"INPATIENT NOTE: [id number]     PRIMARY PROBLEM: Post Hysterectomy      BP (!) 146/117 (BP Location: Left arm)   Pulse 67   Temp 98.5  F (36.9  C) (Oral)   Resp 18   Ht 1.499 m (4' 11\")   Wt 54.1 kg (119 lb 4.8 oz)   SpO2 95%   BMI 24.10 kg/m           Orientation: A&O x4    Pain status: Denies pain, occasional discomfort   Resp: WDL  Cardiac: WDL  : Jenkins cath in place   Skin: bruises on forearm    LDA: PIV running NS 100ml   Diet: Regular   Activity: SBA +1  Alarms/Safety: bed alarm    Consults:        Pt has been A&O x4, Denying pain with occasional discomfort. Pt is still on capno but room air. PIV is running NS at 100ml. Pt is post hysterectomy. Unable to see surgical incisions but they were left open to air. Pt has had minimal spotting through the night, and has chucks underneath her bottom. Pt is resting in bed per her request not to walk and can make her needs known. Will continue to follow plan of care.   "
Shift: [id number]    VS: Afebrile; VSS; RA. Capno in place but O2 weaned off.  Pain: Denies pain  Neuro: WNL  Cardiac: WNL, Denies chest pain  Respiratory: WNL; Denies SOB  Diet/Appetite:  Diet advanced to regular  /GI: Urethral catheter in place/No BM this shift  LDA's: R-PIV infusing with NS@100ml  Skin: Bruises on forarm  Activity: Pt hasn't gotten up to walk. Says she wants to rest. Still has a little bit of bleeding from procedure.       Plan: Pt is A/Ox4. Denies SOB, pain, dizziness, chest pain. Capno is on but weaned off O2. NS infusing @100mlTolerating oral intake. Denies N/V.  was at bedside but left this evening. Pt is surgical Pt and was brought from PACU after procedure for overnight observation. Unable to visualize surgical incisions; open to air. Pt has some spotting. Only chucks underneath her bottoms. Ice pads are on the night stand but Pt doesn't want to use them at this time. Plan is to continue to monitor at this time.  
No

## 2023-03-24 NOTE — DISCHARGE INSTRUCTIONS
[facility]  Prolapse/Pelvic Reconstructive Surgery  Instructions for Caring for yourself after Surgery     How do I manage my pain?   Pain and tenderness should lessen each day. To help keep pain under control, use the following guidelines:  Apply ice packs to your perineum (vaginal and rectal area) for the 1st couple of days.  Take 600 milligrams (mg) of ibuprofen (Advil) every 6 hours for the 1st several days.   Use your prescribed narcotic (hydromorphone) for additional pain relief as needed.  Do not drive, drink alcohol or make any major decisions, such as signing important papers or managing legal issues, while taking prescription pain medication.   Take pain medication with food to avoid an upset stomach.    How do I care for my perineum?  Use pads for vaginal discharge after surgery. Discharge is normal and can last several weeks. Discharge may appear bloody, yellow or white.  Do not place anything in your vagina until advised by your doctor.     What about bathing?     Do not take a tub bath, use a hot tub or swim until advised by your doctor. You may take showers.    What about bowel and bladder management?  Keep stools soft and regular. We recommend using the following medicine that loosens stools and increases bowel movements:  MiraLAX-  17 grams or a capful daily following surgery.    When urinating, do not bear down. Relax and allow the bladder muscle to contract. If you are unable to urinate, contact your doctor.  If you go home with a catheter, your doctor may prescribe an antibiotic for you to take before bed to help prevent infection. Follow up in the clinic as instructed to have the catheter removed.    What about activity?  Do not lift more than 10 pounds for 12 weeks after surgery. Avoid heavy pushing or pulling, such as vacuuming or lawn mowing. Your body s tissues need time to heal and regain maximum strength.  Keep Active. Walking is encouraged. Gradually build up how long and far  you walk. Climbing stairs is OK if able.      You may resume driving when you are no longer taking narcotic pain medication and have the strength to use the brake pedal as needed.     When do I call my doctor?  Call Dr. Aragon call [phone] if you have:     (for urgent questions/concerns CELL PHONE [phone])  -Any post-operative questions or concerns   -A fever over 100.4 F (38 C)    -Difficulty emptying your bladder  -Chills       -Worsening pain              -Nausea or vomiting

## 2023-03-24 NOTE — PROGRESS NOTES
"UROGYNECOLOGY    POST-OP DAY #1    S: Doing well   Ambulating: yes  Diet: regular  Flatus: yes  Pain control: good  Vaginal Pack: none  Jenkins catheter: in place    O:  Vitals: /59 (BP Location: Left arm)   Pulse 67   Temp 98.4  F (36.9  C) (Oral)   Resp 18   Ht 1.499 m (4' 11\")   Wt 54.1 kg (119 lb 4.8 oz)   SpO2 95%   BMI 24.10 kg/m    BMI= Body mass index is 24.1 kg/m .      Intake/Output Summary (Last 24 hours) at 3/24/2023 0928  Last data filed at 3/24/2023 0753  Gross per 24 hour   Intake 2331.67 ml   Output 800 ml   Net 1531.67 ml   Values are inaccurate, verified adequate UOP at bedside- over a liter in bedside bag    Exam:  Appears healthy and well, A&O x3  Abdomen is soft, slight bloating, incisions C/D/I, good BS  Ext SCD, no edema  Jenkins catheter in  Vaginal packing none  no perineal edema, incisions intact.    Labs:  HGB:  pre-op 11 to 12 baseline   Post-op 10.4    Assessment and Plan:  POD# 1  A) Post-Operative Care:    ambulate     ADAT    continue with pain control strategies.    I reviewed post-operative instructions and precautions/ written information provided.    Discharge home anticipated this afternoon    Follow-up for catheter removal next week.    B) Medical:     S/p kidney transplant, has normal renal function     restarted PTA immunosuppression medications    Guerita Aragon MD    "

## 2023-03-25 NOTE — ANESTHESIA POSTPROCEDURE EVALUATION
Patient: Maria E Webster    Procedure: Procedure(s):  Complete Colpectomy, Anterior/Posterior Repair, Enterocele Repair, Levator Pelvic Floor Muscle Plication, Perineorrhaphy, Excision of vulvar cysts X 4  Retropubic Midurethral Sling and Cystoscopy       Anesthesia Type:  General    Note:  Disposition: Inpatient   Postop Pain Control: Uneventful            Sign Out: Well controlled pain   PONV: No   Neuro/Psych: Uneventful            Sign Out: Acceptable/Baseline neuro status   Airway/Respiratory: Uneventful            Sign Out: Acceptable/Baseline resp. status   CV/Hemodynamics: Uneventful            Sign Out: Acceptable CV status; No obvious hypovolemia; No obvious fluid overload   Other NRE: NONE   DID A NON-ROUTINE EVENT OCCUR? No           Last vitals:  Vitals Value Taken Time   /53 03/23/23 1323   Temp 97.3  F (36.3  C) 03/23/23 1230   Pulse 83 03/23/23 1333   Resp 18 03/23/23 1333   SpO2 100 % 03/23/23 1333   Vitals shown include unvalidated device data.    Electronically Signed By: Toy Huizar MD  March 25, 2023  6:51 AM

## 2023-03-27 NOTE — DISCHARGE SUMMARY
DISCHARGE SUMMARY    DATE OF ADMISSION: 3/23/23  DATE OF DISCHARGE: 3/24/23    DIAGNOSIS:   1. Post-hysterectomy vaginal prolapse  2. Associated cystocele, rectocele, and enterocele.  3. Stress urinary incontinence   4. Vulvar cysts     PROCEDURES:   1. Complete colpectomy  2. Anterior repair  2. Enterocele repair  3. Posterior colporrhaphy  4. Levator myorrhaphy  5. Perineorrhaphy   6. Retropubic midurethral sling  7. Cystourethroscopy  8. Excision of 4 vulvar cysts (4 mm, 4 mm, 6 mm, 10 mm)    HOSPITAL COURSE:  A) Post-Operative Care:    ambulate     ADAT    continue with pain control strategies.    I reviewed post-operative instructions and precautions/ written information provided.    Discharge home anticipated this afternoon    Follow-up for catheter removal next week.     B) Medical:     S/p kidney transplant, has normal renal function     restarted PTA immunosuppression medications    DISCHARGE MEDICATION INSTRUCTIONS AND PRESCRIPTIONS REVIEWED/WRITTEN INSTRUCTIONS PROVIDED.      ISABELL TOPETE MD

## 2023-04-24 ENCOUNTER — TRANSFERRED RECORDS (OUTPATIENT)
Dept: HEALTH INFORMATION MANAGEMENT | Facility: CLINIC | Age: 83
End: 2023-04-24
Payer: MEDICARE

## 2023-04-24 LAB
CREATININE (EXTERNAL): 0.53 MG/DL (ref 0.6–0.95)
GFR ESTIMATED (EXTERNAL): 92 ML/MIN/1.73M2
GLUCOSE (EXTERNAL): 80 MG/DL (ref 65–99)
POTASSIUM (EXTERNAL): 3.7 MMOL/L (ref 3.5–5.3)

## 2023-05-02 ENCOUNTER — MEDICAL CORRESPONDENCE (OUTPATIENT)
Dept: HEALTH INFORMATION MANAGEMENT | Facility: CLINIC | Age: 83
End: 2023-05-02
Payer: MEDICARE

## 2023-05-04 DIAGNOSIS — M81.0 SENILE OSTEOPOROSIS: ICD-10-CM

## 2023-05-04 RX ORDER — ALBUTEROL SULFATE 90 UG/1
1-2 AEROSOL, METERED RESPIRATORY (INHALATION)
Status: CANCELLED
Start: 2023-05-04

## 2023-05-04 RX ORDER — MEPERIDINE HYDROCHLORIDE 25 MG/ML
25 INJECTION INTRAMUSCULAR; INTRAVENOUS; SUBCUTANEOUS EVERY 30 MIN PRN
Status: CANCELLED | OUTPATIENT
Start: 2023-05-04

## 2023-05-04 RX ORDER — METHYLPREDNISOLONE SODIUM SUCCINATE 125 MG/2ML
125 INJECTION, POWDER, LYOPHILIZED, FOR SOLUTION INTRAMUSCULAR; INTRAVENOUS
Status: CANCELLED
Start: 2023-05-04

## 2023-05-04 RX ORDER — EPINEPHRINE 1 MG/ML
0.3 INJECTION, SOLUTION, CONCENTRATE INTRAVENOUS EVERY 5 MIN PRN
Status: CANCELLED | OUTPATIENT
Start: 2023-05-04

## 2023-05-04 RX ORDER — ALBUTEROL SULFATE 0.83 MG/ML
2.5 SOLUTION RESPIRATORY (INHALATION)
Status: CANCELLED | OUTPATIENT
Start: 2023-05-04

## 2023-05-04 RX ORDER — DIPHENHYDRAMINE HYDROCHLORIDE 50 MG/ML
50 INJECTION INTRAMUSCULAR; INTRAVENOUS
Status: CANCELLED
Start: 2023-05-04

## 2023-05-10 ENCOUNTER — ALLIED HEALTH/NURSE VISIT (OUTPATIENT)
Dept: INFUSION THERAPY | Facility: CLINIC | Age: 83
End: 2023-05-10
Attending: INTERNAL MEDICINE
Payer: MEDICARE

## 2023-05-10 VITALS
TEMPERATURE: 97.8 F | HEART RATE: 70 BPM | SYSTOLIC BLOOD PRESSURE: 127 MMHG | RESPIRATION RATE: 18 BRPM | DIASTOLIC BLOOD PRESSURE: 67 MMHG

## 2023-05-10 DIAGNOSIS — M81.0 SENILE OSTEOPOROSIS: Primary | ICD-10-CM

## 2023-05-10 PROCEDURE — 96372 THER/PROPH/DIAG INJ SC/IM: CPT | Performed by: INTERNAL MEDICINE

## 2023-05-10 PROCEDURE — 250N000011 HC RX IP 250 OP 636: Performed by: INTERNAL MEDICINE

## 2023-05-10 RX ORDER — ALBUTEROL SULFATE 90 UG/1
1-2 AEROSOL, METERED RESPIRATORY (INHALATION)
Status: CANCELLED
Start: 2023-06-07

## 2023-05-10 RX ORDER — MEPERIDINE HYDROCHLORIDE 25 MG/ML
25 INJECTION INTRAMUSCULAR; INTRAVENOUS; SUBCUTANEOUS EVERY 30 MIN PRN
Status: CANCELLED | OUTPATIENT
Start: 2023-06-07

## 2023-05-10 RX ORDER — METHYLPREDNISOLONE SODIUM SUCCINATE 125 MG/2ML
125 INJECTION, POWDER, LYOPHILIZED, FOR SOLUTION INTRAMUSCULAR; INTRAVENOUS
Status: CANCELLED
Start: 2023-06-07

## 2023-05-10 RX ORDER — EPINEPHRINE 1 MG/ML
0.3 INJECTION, SOLUTION INTRAMUSCULAR; SUBCUTANEOUS EVERY 5 MIN PRN
Status: CANCELLED | OUTPATIENT
Start: 2023-06-07

## 2023-05-10 RX ORDER — DIPHENHYDRAMINE HYDROCHLORIDE 50 MG/ML
50 INJECTION INTRAMUSCULAR; INTRAVENOUS
Status: CANCELLED
Start: 2023-06-07

## 2023-05-10 RX ORDER — ALBUTEROL SULFATE 0.83 MG/ML
2.5 SOLUTION RESPIRATORY (INHALATION)
Status: CANCELLED | OUTPATIENT
Start: 2023-06-07

## 2023-05-10 RX ADMIN — ROMOSOZUMAB-AQQG 210 MG: 105 INJECTION, SOLUTION SUBCUTANEOUS at 12:41

## 2023-05-10 NOTE — PROGRESS NOTES
Infusion Nursing Note:  Maria E Webster presents today for evenity.    Patient seen by provider today: No   present during visit today: Not Applicable.    Note: N/A.      Intravenous Access:  No Intravenous access/labs at this visit.    Treatment Conditions:  Lab Results   Component Value Date     01/13/2022    POTASSIUM 3.5 01/13/2022    CR 0.58 01/13/2022    BERNARDA 8.0 (L) 01/13/2022    BILITOTAL 0.4 01/09/2022    ALBUMIN 3.6 01/09/2022    ALT 26 01/09/2022    AST 15 01/09/2022     Results reviewed, labs MET treatment parameters, ok to proceed with treatment.      Post Infusion Assessment:  Patient tolerated injection without incident.       Discharge Plan:   Discharge instructions reviewed with: Patient.  Patient and/or family verbalized understanding of discharge instructions and all questions answered.  Patient discharged in stable condition accompanied by: self.  Departure Mode: Ambulatory.      Cindy Lira RN

## 2023-06-08 ENCOUNTER — ALLIED HEALTH/NURSE VISIT (OUTPATIENT)
Dept: INFUSION THERAPY | Facility: CLINIC | Age: 83
End: 2023-06-08
Attending: INTERNAL MEDICINE
Payer: MEDICARE

## 2023-06-08 VITALS
DIASTOLIC BLOOD PRESSURE: 72 MMHG | RESPIRATION RATE: 16 BRPM | OXYGEN SATURATION: 99 % | SYSTOLIC BLOOD PRESSURE: 126 MMHG | TEMPERATURE: 97.8 F | HEART RATE: 82 BPM

## 2023-06-08 DIAGNOSIS — M81.0 SENILE OSTEOPOROSIS: Primary | ICD-10-CM

## 2023-06-08 PROCEDURE — 96372 THER/PROPH/DIAG INJ SC/IM: CPT | Performed by: INTERNAL MEDICINE

## 2023-06-08 PROCEDURE — 250N000011 HC RX IP 250 OP 636: Performed by: INTERNAL MEDICINE

## 2023-06-08 RX ORDER — ALBUTEROL SULFATE 0.83 MG/ML
2.5 SOLUTION RESPIRATORY (INHALATION)
Status: CANCELLED | OUTPATIENT
Start: 2023-07-05

## 2023-06-08 RX ORDER — EPINEPHRINE 1 MG/ML
0.3 INJECTION, SOLUTION INTRAMUSCULAR; SUBCUTANEOUS EVERY 5 MIN PRN
Status: CANCELLED | OUTPATIENT
Start: 2023-07-05

## 2023-06-08 RX ORDER — MEPERIDINE HYDROCHLORIDE 25 MG/ML
25 INJECTION INTRAMUSCULAR; INTRAVENOUS; SUBCUTANEOUS EVERY 30 MIN PRN
Status: CANCELLED | OUTPATIENT
Start: 2023-07-05

## 2023-06-08 RX ORDER — DIPHENHYDRAMINE HYDROCHLORIDE 50 MG/ML
50 INJECTION INTRAMUSCULAR; INTRAVENOUS
Status: CANCELLED
Start: 2023-07-05

## 2023-06-08 RX ORDER — ALBUTEROL SULFATE 90 UG/1
1-2 AEROSOL, METERED RESPIRATORY (INHALATION)
Status: CANCELLED
Start: 2023-07-05

## 2023-06-08 RX ORDER — METHYLPREDNISOLONE SODIUM SUCCINATE 125 MG/2ML
125 INJECTION, POWDER, LYOPHILIZED, FOR SOLUTION INTRAMUSCULAR; INTRAVENOUS
Status: CANCELLED
Start: 2023-07-05

## 2023-06-08 RX ADMIN — ROMOSOZUMAB-AQQG 210 MG: 105 INJECTION, SOLUTION SUBCUTANEOUS at 13:31

## 2023-06-08 ASSESSMENT — PAIN SCALES - GENERAL: PAINLEVEL: NO PAIN (0)

## 2023-06-08 NOTE — PROGRESS NOTES
Infusion Nursing Note:  Maria E Webster presents today for evenity.    Patient seen by provider today: No   present during visit today: Not Applicable.    Note: Back brace on for a hairline fracture in lower back.      Intravenous Access:  No Intravenous access/labs at this visit.    Treatment Conditions:  Lab Results   Component Value Date     01/13/2022    POTASSIUM 3.5 01/13/2022    CR 0.58 01/13/2022    BERNARDA 8.0 (L) 01/13/2022    BILITOTAL 0.4 01/09/2022    ALBUMIN 3.6 01/09/2022    ALT 26 01/09/2022    AST 15 01/09/2022     Results reviewed, labs MET treatment parameters, ok to proceed with treatment.      Post Infusion Assessment:  Patient tolerated injection without incident.  Site patent and intact, free from redness, edema or discomfort.       Discharge Plan:   AVS to patient via MYCHART.  Patient will return as prev luciana for next appointment.   Patient discharged in stable condition accompanied by: self.  Departure Mode: Ambulatory.      Praveen Mazariegos RN

## 2023-07-06 ENCOUNTER — ALLIED HEALTH/NURSE VISIT (OUTPATIENT)
Dept: INFUSION THERAPY | Facility: CLINIC | Age: 83
End: 2023-07-06
Attending: INTERNAL MEDICINE
Payer: MEDICARE

## 2023-07-06 VITALS
TEMPERATURE: 97.6 F | OXYGEN SATURATION: 100 % | RESPIRATION RATE: 16 BRPM | DIASTOLIC BLOOD PRESSURE: 73 MMHG | SYSTOLIC BLOOD PRESSURE: 144 MMHG | HEART RATE: 69 BPM

## 2023-07-06 DIAGNOSIS — M81.0 SENILE OSTEOPOROSIS: Primary | ICD-10-CM

## 2023-07-06 PROCEDURE — 250N000011 HC RX IP 250 OP 636: Mod: JZ | Performed by: INTERNAL MEDICINE

## 2023-07-06 PROCEDURE — 96372 THER/PROPH/DIAG INJ SC/IM: CPT | Performed by: INTERNAL MEDICINE

## 2023-07-06 RX ORDER — ALBUTEROL SULFATE 0.83 MG/ML
2.5 SOLUTION RESPIRATORY (INHALATION)
Status: CANCELLED | OUTPATIENT
Start: 2023-08-03

## 2023-07-06 RX ORDER — DIPHENHYDRAMINE HYDROCHLORIDE 50 MG/ML
50 INJECTION INTRAMUSCULAR; INTRAVENOUS
Status: CANCELLED
Start: 2023-08-03

## 2023-07-06 RX ORDER — METHYLPREDNISOLONE SODIUM SUCCINATE 125 MG/2ML
125 INJECTION, POWDER, LYOPHILIZED, FOR SOLUTION INTRAMUSCULAR; INTRAVENOUS
Status: CANCELLED
Start: 2023-08-03

## 2023-07-06 RX ORDER — ALBUTEROL SULFATE 90 UG/1
1-2 AEROSOL, METERED RESPIRATORY (INHALATION)
Status: CANCELLED
Start: 2023-08-03

## 2023-07-06 RX ORDER — EPINEPHRINE 1 MG/ML
0.3 INJECTION, SOLUTION INTRAMUSCULAR; SUBCUTANEOUS EVERY 5 MIN PRN
Status: CANCELLED | OUTPATIENT
Start: 2023-08-03

## 2023-07-06 RX ORDER — MEPERIDINE HYDROCHLORIDE 25 MG/ML
25 INJECTION INTRAMUSCULAR; INTRAVENOUS; SUBCUTANEOUS EVERY 30 MIN PRN
Status: CANCELLED | OUTPATIENT
Start: 2023-08-03

## 2023-07-06 RX ADMIN — ROMOSOZUMAB-AQQG 210 MG: 105 INJECTION, SOLUTION SUBCUTANEOUS at 13:34

## 2023-07-06 ASSESSMENT — PAIN SCALES - GENERAL: PAINLEVEL: NO PAIN (0)

## 2023-07-06 NOTE — PROGRESS NOTES
Infusion Nursing Note:  Maria E Webster presents today for Evenity Injection.    Patient seen by provider today: No   present during visit today: Not Applicable.    Note: N/A.      Intravenous Access:  No Intravenous access/labs at this visit.    Treatment Conditions:  Lab Results   Component Value Date     01/13/2022    POTASSIUM 3.5 01/13/2022    CR 0.58 01/13/2022    BERNARDA 8.0 (L) 01/13/2022    BILITOTAL 0.4 01/09/2022    ALBUMIN 3.6 01/09/2022    ALT 26 01/09/2022    AST 15 01/09/2022     Results reviewed, labs MET treatment parameters, ok to proceed with treatment.      Post Infusion Assessment:  Patient tolerated injection without incident.       Discharge Plan:   Discharge instructions reviewed with: Patient.  Patient and/or family verbalized understanding of discharge instructions and all questions answered.  Patient discharged in stable condition accompanied by: self.  Departure Mode: Ambulatory.      Armida Green RN

## 2023-08-04 ENCOUNTER — ALLIED HEALTH/NURSE VISIT (OUTPATIENT)
Dept: INFUSION THERAPY | Facility: CLINIC | Age: 83
End: 2023-08-04
Attending: INTERNAL MEDICINE
Payer: MEDICARE

## 2023-08-04 VITALS
SYSTOLIC BLOOD PRESSURE: 135 MMHG | RESPIRATION RATE: 16 BRPM | OXYGEN SATURATION: 99 % | DIASTOLIC BLOOD PRESSURE: 74 MMHG | HEART RATE: 68 BPM

## 2023-08-04 DIAGNOSIS — M81.0 SENILE OSTEOPOROSIS: Primary | ICD-10-CM

## 2023-08-04 PROCEDURE — 96372 THER/PROPH/DIAG INJ SC/IM: CPT | Performed by: INTERNAL MEDICINE

## 2023-08-04 PROCEDURE — 250N000011 HC RX IP 250 OP 636: Mod: JZ | Performed by: INTERNAL MEDICINE

## 2023-08-04 RX ORDER — ALBUTEROL SULFATE 90 UG/1
1-2 AEROSOL, METERED RESPIRATORY (INHALATION)
Status: CANCELLED
Start: 2023-08-31

## 2023-08-04 RX ORDER — ALBUTEROL SULFATE 0.83 MG/ML
2.5 SOLUTION RESPIRATORY (INHALATION)
Status: CANCELLED | OUTPATIENT
Start: 2023-08-31

## 2023-08-04 RX ORDER — MEPERIDINE HYDROCHLORIDE 25 MG/ML
25 INJECTION INTRAMUSCULAR; INTRAVENOUS; SUBCUTANEOUS EVERY 30 MIN PRN
Status: CANCELLED | OUTPATIENT
Start: 2023-08-31

## 2023-08-04 RX ORDER — METHYLPREDNISOLONE SODIUM SUCCINATE 125 MG/2ML
125 INJECTION, POWDER, LYOPHILIZED, FOR SOLUTION INTRAMUSCULAR; INTRAVENOUS
Status: CANCELLED
Start: 2023-08-31

## 2023-08-04 RX ORDER — DIPHENHYDRAMINE HYDROCHLORIDE 50 MG/ML
50 INJECTION INTRAMUSCULAR; INTRAVENOUS
Status: CANCELLED
Start: 2023-08-31

## 2023-08-04 RX ORDER — EPINEPHRINE 1 MG/ML
0.3 INJECTION, SOLUTION INTRAMUSCULAR; SUBCUTANEOUS EVERY 5 MIN PRN
Status: CANCELLED | OUTPATIENT
Start: 2023-08-31

## 2023-08-04 RX ADMIN — ROMOSOZUMAB-AQQG 210 MG: 105 INJECTION, SOLUTION SUBCUTANEOUS at 12:27

## 2023-08-04 NOTE — PROGRESS NOTES
Infusion Nursing Note:  Maria E Webster presents today for Evenity.    Patient seen by provider today: No   present during visit today: Not Applicable.    Note: N/A.      Intravenous Access:  No Intravenous access/labs at this visit.    Treatment Conditions:  Not Applicable.      Post Infusion Assessment:  Patient tolerated injection without incident.  Site patent and intact, free from redness, edema or discomfort.  No evidence of extravasations.       Discharge Plan:   Patient declined prescription refills.  Discharge instructions reviewed with: Patient.  Patient and/or family verbalized understanding of discharge instructions and all questions answered.  AVS to patient via [employer].  Patient will return 8/31 for next appointment.   Patient discharged in stable condition accompanied by: self.  Departure Mode: Ambulatory.      Wilfred Jackson RN

## 2023-08-31 ENCOUNTER — ALLIED HEALTH/NURSE VISIT (OUTPATIENT)
Dept: INFUSION THERAPY | Facility: CLINIC | Age: 83
End: 2023-08-31
Attending: INTERNAL MEDICINE
Payer: MEDICARE

## 2023-08-31 VITALS
RESPIRATION RATE: 18 BRPM | SYSTOLIC BLOOD PRESSURE: 131 MMHG | TEMPERATURE: 97.9 F | DIASTOLIC BLOOD PRESSURE: 68 MMHG | HEART RATE: 74 BPM

## 2023-08-31 DIAGNOSIS — M81.0 SENILE OSTEOPOROSIS: Primary | ICD-10-CM

## 2023-08-31 PROCEDURE — 96372 THER/PROPH/DIAG INJ SC/IM: CPT | Performed by: INTERNAL MEDICINE

## 2023-08-31 PROCEDURE — 250N000011 HC RX IP 250 OP 636: Mod: JZ | Performed by: INTERNAL MEDICINE

## 2023-08-31 RX ORDER — EPINEPHRINE 1 MG/ML
0.3 INJECTION, SOLUTION INTRAMUSCULAR; SUBCUTANEOUS EVERY 5 MIN PRN
Status: CANCELLED | OUTPATIENT
Start: 2023-09-01

## 2023-08-31 RX ORDER — DIPHENHYDRAMINE HYDROCHLORIDE 50 MG/ML
50 INJECTION INTRAMUSCULAR; INTRAVENOUS
Status: CANCELLED
Start: 2023-09-01

## 2023-08-31 RX ORDER — ALBUTEROL SULFATE 0.83 MG/ML
2.5 SOLUTION RESPIRATORY (INHALATION)
Status: CANCELLED | OUTPATIENT
Start: 2023-09-01

## 2023-08-31 RX ORDER — ALBUTEROL SULFATE 90 UG/1
1-2 AEROSOL, METERED RESPIRATORY (INHALATION)
Status: CANCELLED
Start: 2023-09-01

## 2023-08-31 RX ORDER — MEPERIDINE HYDROCHLORIDE 25 MG/ML
25 INJECTION INTRAMUSCULAR; INTRAVENOUS; SUBCUTANEOUS EVERY 30 MIN PRN
Status: CANCELLED | OUTPATIENT
Start: 2023-09-01

## 2023-08-31 RX ORDER — METHYLPREDNISOLONE SODIUM SUCCINATE 125 MG/2ML
125 INJECTION, POWDER, LYOPHILIZED, FOR SOLUTION INTRAMUSCULAR; INTRAVENOUS
Status: CANCELLED
Start: 2023-09-01

## 2023-08-31 RX ADMIN — ROMOSOZUMAB-AQQG 210 MG: 105 INJECTION, SOLUTION SUBCUTANEOUS at 11:00

## 2023-09-28 ENCOUNTER — ALLIED HEALTH/NURSE VISIT (OUTPATIENT)
Dept: INFUSION THERAPY | Facility: CLINIC | Age: 83
End: 2023-09-28
Attending: INTERNAL MEDICINE
Payer: MEDICARE

## 2023-09-28 VITALS
TEMPERATURE: 97.8 F | HEART RATE: 68 BPM | DIASTOLIC BLOOD PRESSURE: 77 MMHG | OXYGEN SATURATION: 99 % | RESPIRATION RATE: 18 BRPM | SYSTOLIC BLOOD PRESSURE: 136 MMHG

## 2023-09-28 DIAGNOSIS — M81.0 SENILE OSTEOPOROSIS: Primary | ICD-10-CM

## 2023-09-28 LAB
CALCIUM SERPL-MCNC: 9.1 MG/DL (ref 8.8–10.2)
CREAT SERPL-MCNC: 0.56 MG/DL (ref 0.51–0.95)
EGFRCR SERPLBLD CKD-EPI 2021: 90 ML/MIN/1.73M2

## 2023-09-28 PROCEDURE — 82565 ASSAY OF CREATININE: CPT | Performed by: INTERNAL MEDICINE

## 2023-09-28 PROCEDURE — 36415 COLL VENOUS BLD VENIPUNCTURE: CPT | Performed by: INTERNAL MEDICINE

## 2023-09-28 PROCEDURE — 96372 THER/PROPH/DIAG INJ SC/IM: CPT | Performed by: INTERNAL MEDICINE

## 2023-09-28 PROCEDURE — 250N000011 HC RX IP 250 OP 636: Mod: JZ | Performed by: INTERNAL MEDICINE

## 2023-09-28 PROCEDURE — 82310 ASSAY OF CALCIUM: CPT | Performed by: INTERNAL MEDICINE

## 2023-09-28 RX ORDER — ALBUTEROL SULFATE 90 UG/1
1-2 AEROSOL, METERED RESPIRATORY (INHALATION)
Status: CANCELLED
Start: 2023-09-29

## 2023-09-28 RX ORDER — DIPHENHYDRAMINE HYDROCHLORIDE 50 MG/ML
50 INJECTION INTRAMUSCULAR; INTRAVENOUS
Status: CANCELLED
Start: 2023-09-29

## 2023-09-28 RX ORDER — METHYLPREDNISOLONE SODIUM SUCCINATE 125 MG/2ML
125 INJECTION, POWDER, LYOPHILIZED, FOR SOLUTION INTRAMUSCULAR; INTRAVENOUS
Status: CANCELLED
Start: 2023-09-29

## 2023-09-28 RX ORDER — MEPERIDINE HYDROCHLORIDE 25 MG/ML
25 INJECTION INTRAMUSCULAR; INTRAVENOUS; SUBCUTANEOUS EVERY 30 MIN PRN
Status: CANCELLED | OUTPATIENT
Start: 2023-09-29

## 2023-09-28 RX ORDER — ALBUTEROL SULFATE 0.83 MG/ML
2.5 SOLUTION RESPIRATORY (INHALATION)
Status: CANCELLED | OUTPATIENT
Start: 2023-09-29

## 2023-09-28 RX ORDER — EPINEPHRINE 1 MG/ML
0.3 INJECTION, SOLUTION INTRAMUSCULAR; SUBCUTANEOUS EVERY 5 MIN PRN
Status: CANCELLED | OUTPATIENT
Start: 2023-09-29

## 2023-09-28 RX ADMIN — ROMOSOZUMAB-AQQG 210 MG: 105 INJECTION, SOLUTION SUBCUTANEOUS at 09:13

## 2023-09-28 ASSESSMENT — PAIN SCALES - GENERAL: PAINLEVEL: NO PAIN (0)

## 2023-09-28 NOTE — PROGRESS NOTES
Infusion Nursing Note:  Maria E Raymond Terrymargaret presents today for labs/Evenity.    Patient seen by provider today: No   present during visit today: Not Applicable.    Note: N/A.      Intravenous Access:  Lab draw site right ac, Needle type butterfly, Gauge 23.  Labs drawn without difficulty.    Treatment Conditions:  Not Applicable.      Post Infusion Assessment:  Patient tolerated injection without incident.  Site patent and intact, free from redness, edema or discomfort.       Discharge Plan:   Patient will return 10/26/23 for next appointment.  Patient discharged in stable condition accompanied by: self.  Departure Mode: Ambulatory.      Alma Delia Apodaca RN

## 2023-11-03 ENCOUNTER — ALLIED HEALTH/NURSE VISIT (OUTPATIENT)
Dept: INFUSION THERAPY | Facility: CLINIC | Age: 83
End: 2023-11-03
Attending: INTERNAL MEDICINE
Payer: MEDICARE

## 2023-11-03 VITALS
DIASTOLIC BLOOD PRESSURE: 72 MMHG | RESPIRATION RATE: 16 BRPM | TEMPERATURE: 97.6 F | HEART RATE: 80 BPM | SYSTOLIC BLOOD PRESSURE: 122 MMHG | OXYGEN SATURATION: 98 %

## 2023-11-03 DIAGNOSIS — M81.0 SENILE OSTEOPOROSIS: Primary | ICD-10-CM

## 2023-11-03 PROCEDURE — 96372 THER/PROPH/DIAG INJ SC/IM: CPT | Performed by: INTERNAL MEDICINE

## 2023-11-03 PROCEDURE — 250N000011 HC RX IP 250 OP 636: Mod: JZ | Performed by: INTERNAL MEDICINE

## 2023-11-03 RX ORDER — ALBUTEROL SULFATE 0.83 MG/ML
2.5 SOLUTION RESPIRATORY (INHALATION)
Status: CANCELLED | OUTPATIENT
Start: 2023-11-24

## 2023-11-03 RX ORDER — EPINEPHRINE 1 MG/ML
0.3 INJECTION, SOLUTION INTRAMUSCULAR; SUBCUTANEOUS EVERY 5 MIN PRN
Status: CANCELLED | OUTPATIENT
Start: 2023-11-24

## 2023-11-03 RX ORDER — METHYLPREDNISOLONE SODIUM SUCCINATE 125 MG/2ML
125 INJECTION, POWDER, LYOPHILIZED, FOR SOLUTION INTRAMUSCULAR; INTRAVENOUS
Status: CANCELLED
Start: 2023-11-24

## 2023-11-03 RX ORDER — DIPHENHYDRAMINE HYDROCHLORIDE 50 MG/ML
50 INJECTION INTRAMUSCULAR; INTRAVENOUS
Status: CANCELLED
Start: 2023-11-24

## 2023-11-03 RX ORDER — ALBUTEROL SULFATE 90 UG/1
1-2 AEROSOL, METERED RESPIRATORY (INHALATION)
Status: CANCELLED
Start: 2023-11-24

## 2023-11-03 RX ORDER — MEPERIDINE HYDROCHLORIDE 25 MG/ML
25 INJECTION INTRAMUSCULAR; INTRAVENOUS; SUBCUTANEOUS EVERY 30 MIN PRN
Status: CANCELLED | OUTPATIENT
Start: 2023-11-24

## 2023-11-03 RX ADMIN — ROMOSOZUMAB-AQQG 210 MG: 105 INJECTION, SOLUTION SUBCUTANEOUS at 07:23

## 2023-11-03 ASSESSMENT — PAIN SCALES - GENERAL: PAINLEVEL: NO PAIN (0)

## 2023-11-03 NOTE — PROGRESS NOTES
Infusion Nursing Note:  Maria E Webster presents today for Evenity.    Patient seen by provider today: No   present during visit today: Not Applicable.    Note: N/A.      Intravenous Access:  No Intravenous access/labs at this visit.    Treatment Conditions:  Lab Results   Component Value Date     01/13/2022    POTASSIUM 3.5 01/13/2022    CR 0.56 09/28/2023    BERNARDA 9.1 09/28/2023    BILITOTAL 0.4 01/09/2022    ALBUMIN 3.6 01/09/2022    ALT 26 01/09/2022    AST 15 01/09/2022       Results reviewed, labs MET treatment parameters, ok to proceed with treatment.      Post Infusion Assessment:  Patient tolerated injection without incident.       Discharge Plan:   Patient declined prescription refills.  Discharge instructions reviewed with: Patient and Family.   Patient discharged in stable condition accompanied by: self.      Armida Green RN

## 2023-12-01 ENCOUNTER — INFUSION THERAPY VISIT (OUTPATIENT)
Dept: INFUSION THERAPY | Facility: CLINIC | Age: 83
End: 2023-12-01
Attending: INTERNAL MEDICINE
Payer: MEDICARE

## 2023-12-01 VITALS
BODY MASS INDEX: 23.06 KG/M2 | OXYGEN SATURATION: 98 % | DIASTOLIC BLOOD PRESSURE: 62 MMHG | SYSTOLIC BLOOD PRESSURE: 124 MMHG | TEMPERATURE: 97.4 F | HEART RATE: 78 BPM | WEIGHT: 114.4 LBS | HEIGHT: 59 IN | RESPIRATION RATE: 16 BRPM

## 2023-12-01 DIAGNOSIS — M81.0 SENILE OSTEOPOROSIS: Primary | ICD-10-CM

## 2023-12-01 PROCEDURE — 96372 THER/PROPH/DIAG INJ SC/IM: CPT | Performed by: INTERNAL MEDICINE

## 2023-12-01 PROCEDURE — 250N000011 HC RX IP 250 OP 636: Mod: JZ | Performed by: INTERNAL MEDICINE

## 2023-12-01 RX ORDER — ALBUTEROL SULFATE 90 UG/1
1-2 AEROSOL, METERED RESPIRATORY (INHALATION)
Status: CANCELLED
Start: 2023-12-29

## 2023-12-01 RX ORDER — ALBUTEROL SULFATE 0.83 MG/ML
2.5 SOLUTION RESPIRATORY (INHALATION)
Status: CANCELLED | OUTPATIENT
Start: 2023-12-29

## 2023-12-01 RX ORDER — EPINEPHRINE 1 MG/ML
0.3 INJECTION, SOLUTION INTRAMUSCULAR; SUBCUTANEOUS EVERY 5 MIN PRN
Status: CANCELLED | OUTPATIENT
Start: 2023-12-29

## 2023-12-01 RX ORDER — METHYLPREDNISOLONE SODIUM SUCCINATE 125 MG/2ML
125 INJECTION, POWDER, LYOPHILIZED, FOR SOLUTION INTRAMUSCULAR; INTRAVENOUS
Status: CANCELLED
Start: 2023-12-29

## 2023-12-01 RX ORDER — MEPERIDINE HYDROCHLORIDE 25 MG/ML
25 INJECTION INTRAMUSCULAR; INTRAVENOUS; SUBCUTANEOUS EVERY 30 MIN PRN
Status: CANCELLED | OUTPATIENT
Start: 2023-12-29

## 2023-12-01 RX ORDER — DIPHENHYDRAMINE HYDROCHLORIDE 50 MG/ML
50 INJECTION INTRAMUSCULAR; INTRAVENOUS
Status: CANCELLED
Start: 2023-12-29

## 2023-12-01 RX ADMIN — ROMOSOZUMAB-AQQG 210 MG: 105 INJECTION, SOLUTION SUBCUTANEOUS at 13:43

## 2023-12-01 NOTE — PROGRESS NOTES
Infusion Nursing Note:  Maria E Webster presents today for Evenity injections   Patient seen by provider today: No   present during visit today: Not Applicable.    Note: Pt reports no new health changes or concerns.      Intravenous Access:  No Intravenous access/labs at this visit.    Treatment Conditions:  Lab Results   Component Value Date     01/13/2022    POTASSIUM 3.5 01/13/2022    CR 0.56 09/28/2023    BERNARDA 9.1 09/28/2023    BILITOTAL 0.4 01/09/2022    ALBUMIN 3.6 01/09/2022    ALT 26 01/09/2022    AST 15 01/09/2022       Results reviewed, labs MET treatment parameters, ok to proceed with treatment.      Post Infusion Assessment:  Patient tolerated injection without incident.  Site patent and intact, free from redness, edema or discomfort.  Access discontinued per protocol.       Discharge Plan:   Patient declined prescription refills.  Discharge instructions reviewed with: Patient.  Patient and/or family verbalized understanding of discharge instructions and all questions answered.  AVS to patient via [employer].  Patient will return next month for next appointment.   Patient discharged in stable condition accompanied by: self.  Departure Mode: Ambulatory.      Monique Otero RN

## 2023-12-29 ENCOUNTER — INFUSION THERAPY VISIT (OUTPATIENT)
Dept: INFUSION THERAPY | Facility: CLINIC | Age: 83
End: 2023-12-29
Attending: INTERNAL MEDICINE
Payer: MEDICARE

## 2023-12-29 VITALS
TEMPERATURE: 97.7 F | HEART RATE: 67 BPM | SYSTOLIC BLOOD PRESSURE: 127 MMHG | OXYGEN SATURATION: 99 % | RESPIRATION RATE: 16 BRPM | DIASTOLIC BLOOD PRESSURE: 73 MMHG

## 2023-12-29 DIAGNOSIS — M81.0 SENILE OSTEOPOROSIS: Primary | ICD-10-CM

## 2023-12-29 PROCEDURE — 250N000011 HC RX IP 250 OP 636: Mod: JZ | Performed by: INTERNAL MEDICINE

## 2023-12-29 PROCEDURE — 96372 THER/PROPH/DIAG INJ SC/IM: CPT | Performed by: INTERNAL MEDICINE

## 2023-12-29 RX ORDER — DIPHENHYDRAMINE HYDROCHLORIDE 50 MG/ML
50 INJECTION INTRAMUSCULAR; INTRAVENOUS
Status: CANCELLED
Start: 2024-01-26

## 2023-12-29 RX ORDER — METHYLPREDNISOLONE SODIUM SUCCINATE 125 MG/2ML
125 INJECTION, POWDER, LYOPHILIZED, FOR SOLUTION INTRAMUSCULAR; INTRAVENOUS
Status: CANCELLED
Start: 2024-01-26

## 2023-12-29 RX ORDER — ALBUTEROL SULFATE 0.83 MG/ML
2.5 SOLUTION RESPIRATORY (INHALATION)
Status: CANCELLED | OUTPATIENT
Start: 2024-01-26

## 2023-12-29 RX ORDER — MEPERIDINE HYDROCHLORIDE 25 MG/ML
25 INJECTION INTRAMUSCULAR; INTRAVENOUS; SUBCUTANEOUS EVERY 30 MIN PRN
Status: CANCELLED | OUTPATIENT
Start: 2024-01-26

## 2023-12-29 RX ORDER — EPINEPHRINE 1 MG/ML
0.3 INJECTION, SOLUTION INTRAMUSCULAR; SUBCUTANEOUS EVERY 5 MIN PRN
Status: CANCELLED | OUTPATIENT
Start: 2024-01-26

## 2023-12-29 RX ORDER — MIRABEGRON 50 MG/1
1 TABLET, EXTENDED RELEASE ORAL DAILY
COMMUNITY
Start: 2023-12-11 | End: 2024-03-22

## 2023-12-29 RX ORDER — ALBUTEROL SULFATE 90 UG/1
1-2 AEROSOL, METERED RESPIRATORY (INHALATION)
Status: CANCELLED
Start: 2024-01-26

## 2023-12-29 RX ADMIN — ROMOSOZUMAB-AQQG 210 MG: 105 INJECTION, SOLUTION SUBCUTANEOUS at 13:26

## 2023-12-29 NOTE — PROGRESS NOTES
Infusion Nursing Note:  Maria E Webster presents today for evenity.    Patient seen by provider today: No   present during visit today: Not Applicable.    Note: N/A.      Intravenous Access:  No Intravenous access/labs at this visit.    Treatment Conditions:  Not Applicable.      Post Infusion Assessment:  Patient tolerated injection without incident.  Site patent and intact, free from redness, edema or discomfort.       Discharge Plan:   Discharge instructions reviewed with: Patient.  Patient and/or family verbalized understanding of discharge instructions and all questions answered.  AVS to patient via [employer].  Patient will return 1/26/24 for next appointment.   Patient discharged in stable condition accompanied by: self.  Departure Mode: Ambulatory.      Jovita Goetz RN

## 2024-01-02 ENCOUNTER — TELEPHONE (OUTPATIENT)
Dept: ONCOLOGY | Facility: CLINIC | Age: 84
End: 2024-01-02
Payer: MEDICARE

## 2024-01-02 NOTE — TELEPHONE ENCOUNTER
Pt is calling. She received evenity on 12/29/2023.  Pt states that she developed red spots on her arms after the injection that lasted until the next day.  Denies itching or pain.    Writer advised pt to reach out to ordering provider, Dr Walsh.  Dr Walsh's office needs to be updated on her symptoms as they are the ordering provider and the one managing her care.    Patient verbalized understanding and agreement with plan.    Kate Liu RN on 1/2/2024 at 12:42 PM

## 2024-01-26 ENCOUNTER — INFUSION THERAPY VISIT (OUTPATIENT)
Dept: INFUSION THERAPY | Facility: CLINIC | Age: 84
End: 2024-01-26
Attending: INTERNAL MEDICINE
Payer: MEDICARE

## 2024-01-26 VITALS
HEART RATE: 81 BPM | RESPIRATION RATE: 16 BRPM | DIASTOLIC BLOOD PRESSURE: 82 MMHG | TEMPERATURE: 97 F | OXYGEN SATURATION: 97 % | SYSTOLIC BLOOD PRESSURE: 136 MMHG

## 2024-01-26 DIAGNOSIS — M81.0 SENILE OSTEOPOROSIS: Primary | ICD-10-CM

## 2024-01-26 PROCEDURE — 250N000011 HC RX IP 250 OP 636: Mod: JZ | Performed by: INTERNAL MEDICINE

## 2024-01-26 PROCEDURE — 96372 THER/PROPH/DIAG INJ SC/IM: CPT | Performed by: INTERNAL MEDICINE

## 2024-01-26 RX ORDER — DIPHENHYDRAMINE HYDROCHLORIDE 50 MG/ML
50 INJECTION INTRAMUSCULAR; INTRAVENOUS
Status: CANCELLED
Start: 2024-02-23

## 2024-01-26 RX ORDER — ALBUTEROL SULFATE 90 UG/1
1-2 AEROSOL, METERED RESPIRATORY (INHALATION)
Status: CANCELLED
Start: 2024-02-23

## 2024-01-26 RX ORDER — METHYLPREDNISOLONE SODIUM SUCCINATE 125 MG/2ML
125 INJECTION, POWDER, LYOPHILIZED, FOR SOLUTION INTRAMUSCULAR; INTRAVENOUS
Status: CANCELLED
Start: 2024-02-23

## 2024-01-26 RX ORDER — MEPERIDINE HYDROCHLORIDE 25 MG/ML
25 INJECTION INTRAMUSCULAR; INTRAVENOUS; SUBCUTANEOUS EVERY 30 MIN PRN
Status: CANCELLED | OUTPATIENT
Start: 2024-02-23

## 2024-01-26 RX ORDER — ALBUTEROL SULFATE 0.83 MG/ML
2.5 SOLUTION RESPIRATORY (INHALATION)
Status: CANCELLED | OUTPATIENT
Start: 2024-02-23

## 2024-01-26 RX ORDER — EPINEPHRINE 1 MG/ML
0.3 INJECTION, SOLUTION INTRAMUSCULAR; SUBCUTANEOUS EVERY 5 MIN PRN
Status: CANCELLED | OUTPATIENT
Start: 2024-02-23

## 2024-01-26 RX ADMIN — ROMOSOZUMAB-AQQG 210 MG: 105 INJECTION, SOLUTION SUBCUTANEOUS at 13:16

## 2024-01-26 NOTE — PROGRESS NOTES
Infusion Nursing Note:  Maria E Webster presents today for Evenity injections   Patient seen by provider today: No   present during visit today: Not Applicable.     Note: Pt reports no new health changes or concerns. Pt denies history of MI, stroke, and unusual thigh, hip, or groin pain. Pt confirms she takes her Vit D and Ca as prescribed. Pt did report she had some redness post previous Evenity at the injection site, with no itchiness. Pt notified Dr. Walsh about the local redness, and he cleared pt to continue with subsequent injections. Pt was encouraged to call her provider if irritation at the injection site reoccurs.  Pt expressed understating.        Intravenous Access:  No Intravenous access/labs at this visit.     Treatment Conditions:    Lab Results   Component Value Date     01/13/2022    POTASSIUM 3.5 01/13/2022    CR 0.56 09/28/2023    BERNARDA 9.1 09/28/2023    BILITOTAL 0.4 01/09/2022    ALBUMIN 3.6 01/09/2022    ALT 26 01/09/2022    AST 15 01/09/2022            Results reviewed, labs MET treatment parameters, ok to proceed with treatment.        Post Infusion Assessment:  Patient tolerated injection without incident.  Site patent and intact, free from redness, edema or discomfort.  Access discontinued per protocol.         Discharge Plan:   Patient declined prescription refills.  Discharge instructions reviewed with: Patient.  Patient and/or family verbalized understanding of discharge instructions and all questions answered.  AVS to patient via [employer].  Patient will return 2/23/24 for next appt.  Patient discharged in stable condition accompanied by: self.  Departure Mode: Ambulatory.        Monique Otero RN

## 2024-02-23 ENCOUNTER — INFUSION THERAPY VISIT (OUTPATIENT)
Dept: INFUSION THERAPY | Facility: CLINIC | Age: 84
End: 2024-02-23
Attending: INTERNAL MEDICINE
Payer: MEDICARE

## 2024-02-23 VITALS
RESPIRATION RATE: 20 BRPM | SYSTOLIC BLOOD PRESSURE: 125 MMHG | TEMPERATURE: 97.6 F | HEART RATE: 73 BPM | OXYGEN SATURATION: 98 % | DIASTOLIC BLOOD PRESSURE: 59 MMHG

## 2024-02-23 DIAGNOSIS — M81.0 SENILE OSTEOPOROSIS: Primary | ICD-10-CM

## 2024-02-23 PROCEDURE — 250N000011 HC RX IP 250 OP 636: Mod: JZ | Performed by: INTERNAL MEDICINE

## 2024-02-23 PROCEDURE — 96372 THER/PROPH/DIAG INJ SC/IM: CPT | Performed by: INTERNAL MEDICINE

## 2024-02-23 RX ORDER — ALBUTEROL SULFATE 0.83 MG/ML
2.5 SOLUTION RESPIRATORY (INHALATION)
Status: CANCELLED | OUTPATIENT
Start: 2024-03-22

## 2024-02-23 RX ORDER — ALBUTEROL SULFATE 90 UG/1
1-2 AEROSOL, METERED RESPIRATORY (INHALATION)
Status: CANCELLED
Start: 2024-03-22

## 2024-02-23 RX ORDER — METHYLPREDNISOLONE SODIUM SUCCINATE 125 MG/2ML
125 INJECTION, POWDER, LYOPHILIZED, FOR SOLUTION INTRAMUSCULAR; INTRAVENOUS
Status: CANCELLED
Start: 2024-03-22

## 2024-02-23 RX ORDER — MEPERIDINE HYDROCHLORIDE 25 MG/ML
25 INJECTION INTRAMUSCULAR; INTRAVENOUS; SUBCUTANEOUS EVERY 30 MIN PRN
Status: CANCELLED | OUTPATIENT
Start: 2024-03-22

## 2024-02-23 RX ORDER — EPINEPHRINE 1 MG/ML
0.3 INJECTION, SOLUTION INTRAMUSCULAR; SUBCUTANEOUS EVERY 5 MIN PRN
Status: CANCELLED | OUTPATIENT
Start: 2024-03-22

## 2024-02-23 RX ORDER — DIPHENHYDRAMINE HYDROCHLORIDE 50 MG/ML
50 INJECTION INTRAMUSCULAR; INTRAVENOUS
Status: CANCELLED
Start: 2024-03-22

## 2024-02-23 RX ADMIN — ROMOSOZUMAB-AQQG 210 MG: 105 INJECTION, SOLUTION SUBCUTANEOUS at 13:05

## 2024-02-23 ASSESSMENT — PAIN SCALES - GENERAL: PAINLEVEL: NO PAIN (0)

## 2024-02-23 NOTE — PROGRESS NOTES
Infusion Nursing Note:  Maria E Webster presents today for Evenity.    Patient seen by provider today: No   present during visit today: Not Applicable.    Note: N/A.      Intravenous Access:  No Intravenous access/labs at this visit.    Treatment Conditions:  Not Applicable.      Post Infusion Assessment:  Patient tolerated injection without incident.  Site patent and intact, free from redness, edema or discomfort.  No evidence of extravasations.       Discharge Plan:   Discharge instructions reviewed with: Patient.  Patient and/or family verbalized understanding of discharge instructions and all questions answered.  AVS to patient via [employer].  Patient will return 3/22 for next appointment.   Patient discharged in stable condition accompanied by: self.  Departure Mode: Ambulatory.      Yolanda Sanford RN

## 2024-03-22 ENCOUNTER — INFUSION THERAPY VISIT (OUTPATIENT)
Dept: INFUSION THERAPY | Facility: CLINIC | Age: 84
End: 2024-03-22
Attending: INTERNAL MEDICINE
Payer: MEDICARE

## 2024-03-22 VITALS
HEART RATE: 77 BPM | RESPIRATION RATE: 16 BRPM | DIASTOLIC BLOOD PRESSURE: 71 MMHG | SYSTOLIC BLOOD PRESSURE: 126 MMHG | OXYGEN SATURATION: 96 % | TEMPERATURE: 97.9 F

## 2024-03-22 DIAGNOSIS — M81.0 SENILE OSTEOPOROSIS: Primary | ICD-10-CM

## 2024-03-22 PROCEDURE — 96372 THER/PROPH/DIAG INJ SC/IM: CPT | Performed by: INTERNAL MEDICINE

## 2024-03-22 PROCEDURE — 250N000011 HC RX IP 250 OP 636: Mod: JZ | Performed by: INTERNAL MEDICINE

## 2024-03-22 RX ORDER — ALBUTEROL SULFATE 0.83 MG/ML
2.5 SOLUTION RESPIRATORY (INHALATION)
Status: CANCELLED | OUTPATIENT
Start: 2024-03-26

## 2024-03-22 RX ORDER — METHYLPREDNISOLONE SODIUM SUCCINATE 125 MG/2ML
125 INJECTION, POWDER, LYOPHILIZED, FOR SOLUTION INTRAMUSCULAR; INTRAVENOUS
Status: CANCELLED
Start: 2024-03-26

## 2024-03-22 RX ORDER — DIPHENHYDRAMINE HYDROCHLORIDE 50 MG/ML
50 INJECTION INTRAMUSCULAR; INTRAVENOUS
Status: CANCELLED
Start: 2024-03-26

## 2024-03-22 RX ORDER — ALBUTEROL SULFATE 90 UG/1
1-2 AEROSOL, METERED RESPIRATORY (INHALATION)
Status: CANCELLED
Start: 2024-03-26

## 2024-03-22 RX ORDER — MEPERIDINE HYDROCHLORIDE 25 MG/ML
25 INJECTION INTRAMUSCULAR; INTRAVENOUS; SUBCUTANEOUS EVERY 30 MIN PRN
Status: CANCELLED | OUTPATIENT
Start: 2024-03-26

## 2024-03-22 RX ORDER — EPINEPHRINE 1 MG/ML
0.3 INJECTION, SOLUTION INTRAMUSCULAR; SUBCUTANEOUS EVERY 5 MIN PRN
Status: CANCELLED | OUTPATIENT
Start: 2024-03-26

## 2024-03-22 RX ADMIN — ROMOSOZUMAB-AQQG 210 MG: 105 INJECTION, SOLUTION SUBCUTANEOUS at 13:22

## 2024-03-22 NOTE — PROGRESS NOTES
Infusion Nursing Note:  Maria E Webster presents today for Evenity.    Patient seen by provider today: No   present during visit today: Not Applicable.    Note: Patient reports to tolerating past Evenity.  Today is patient's 12 dose.  Patient is under the impression that she is to get 12 months worth, which is the order but has received 12 injections since May 2023 due to receiving 2 in the month of August (4 weeks apart). Per our pharmacy, patient has completed her Evenity as of today.  Patient will call Dr. Shekhar Walsh on Monday to clarify if she needs one more dose in April or not and to also clarify the plan moving forward.  Patient will call our clinic to cancel her April appointment if needed.       Intravenous Access:  No Intravenous access/labs at this visit.    Treatment Conditions:  Lab Results   Component Value Date     01/13/2022    POTASSIUM 3.5 01/13/2022    CR 0.56 09/28/2023    BERNARDA 9.1 09/28/2023    BILITOTAL 0.4 01/09/2022    ALBUMIN 3.6 01/09/2022    ALT 26 01/09/2022    AST 15 01/09/2022       Results reviewed, labs MET treatment parameters, ok to proceed with treatment.      Post Infusion Assessment:  Patient tolerated two Evenity injections, one to each arm without incident.       Discharge Plan:   Patient declined prescription refills.  Discharge instructions reviewed with: Patient.  Patient and/or family verbalized understanding of discharge instructions and all questions answered.19  AVS to patient via [employer].  Patient scheduled for 4/19/24 for possible Evenity-likely to cancel.  Patient discharged in stable condition accompanied by: self.  Departure Mode: Ambulatory.      Stephenie Beauchamp RN

## 2024-05-08 ENCOUNTER — MEDICAL CORRESPONDENCE (OUTPATIENT)
Dept: HEALTH INFORMATION MANAGEMENT | Facility: CLINIC | Age: 84
End: 2024-05-08

## 2024-06-21 ENCOUNTER — HOSPITAL ENCOUNTER (OUTPATIENT)
Facility: CLINIC | Age: 84
Setting detail: OBSERVATION
Discharge: HOME | End: 2024-06-23
Attending: EMERGENCY MEDICINE | Admitting: INTERNAL MEDICINE
Payer: MEDICARE

## 2024-06-21 ENCOUNTER — APPOINTMENT (OUTPATIENT)
Dept: CT IMAGING | Facility: CLINIC | Age: 84
End: 2024-06-21
Attending: EMERGENCY MEDICINE
Payer: MEDICARE

## 2024-06-21 DIAGNOSIS — K59.00 OBSTIPATION: Primary | ICD-10-CM

## 2024-06-21 DIAGNOSIS — K21.00 GASTROESOPHAGEAL REFLUX DISEASE WITH ESOPHAGITIS WITHOUT HEMORRHAGE: ICD-10-CM

## 2024-06-21 DIAGNOSIS — K56.609 SMALL BOWEL OBSTRUCTION (H): ICD-10-CM

## 2024-06-21 DIAGNOSIS — K21.9 GASTROESOPHAGEAL REFLUX DISEASE WITHOUT ESOPHAGITIS: ICD-10-CM

## 2024-06-21 LAB
ALBUMIN SERPL BCG-MCNC: 4.4 G/DL (ref 3.5–5.2)
ALP SERPL-CCNC: 62 U/L (ref 40–150)
ALT SERPL W P-5'-P-CCNC: 15 U/L (ref 0–50)
ANION GAP SERPL CALCULATED.3IONS-SCNC: 12 MMOL/L (ref 7–15)
AST SERPL W P-5'-P-CCNC: 22 U/L (ref 0–45)
BASOPHILS # BLD AUTO: 0 10E3/UL (ref 0–0.2)
BASOPHILS NFR BLD AUTO: 1 %
BILIRUB SERPL-MCNC: 0.6 MG/DL
BUN SERPL-MCNC: 12.9 MG/DL (ref 8–23)
CALCIUM SERPL-MCNC: 9.7 MG/DL (ref 8.8–10.2)
CHLORIDE SERPL-SCNC: 96 MMOL/L (ref 98–107)
CREAT SERPL-MCNC: 0.5 MG/DL (ref 0.51–0.95)
DEPRECATED HCO3 PLAS-SCNC: 24 MMOL/L (ref 22–29)
EGFRCR SERPLBLD CKD-EPI 2021: >90 ML/MIN/1.73M2
EOSINOPHIL # BLD AUTO: 0 10E3/UL (ref 0–0.7)
EOSINOPHIL NFR BLD AUTO: 0 %
ERYTHROCYTE [DISTWIDTH] IN BLOOD BY AUTOMATED COUNT: 13.2 % (ref 10–15)
GLUCOSE SERPL-MCNC: 106 MG/DL (ref 70–99)
HCT VFR BLD AUTO: 40.6 % (ref 35–47)
HGB BLD-MCNC: 13.5 G/DL (ref 11.7–15.7)
IMM GRANULOCYTES # BLD: 0 10E3/UL
IMM GRANULOCYTES NFR BLD: 0 %
LIPASE SERPL-CCNC: 46 U/L (ref 13–60)
LYMPHOCYTES # BLD AUTO: 0.8 10E3/UL (ref 0.8–5.3)
LYMPHOCYTES NFR BLD AUTO: 13 %
MCH RBC QN AUTO: 31.2 PG (ref 26.5–33)
MCHC RBC AUTO-ENTMCNC: 33.3 G/DL (ref 31.5–36.5)
MCV RBC AUTO: 94 FL (ref 78–100)
MONOCYTES # BLD AUTO: 0.5 10E3/UL (ref 0–1.3)
MONOCYTES NFR BLD AUTO: 7 %
NEUTROPHILS # BLD AUTO: 5 10E3/UL (ref 1.6–8.3)
NEUTROPHILS NFR BLD AUTO: 79 %
NRBC # BLD AUTO: 0 10E3/UL
NRBC BLD AUTO-RTO: 0 /100
PLATELET # BLD AUTO: 311 10E3/UL (ref 150–450)
POTASSIUM SERPL-SCNC: 4.2 MMOL/L (ref 3.4–5.3)
PROT SERPL-MCNC: 7.2 G/DL (ref 6.4–8.3)
RBC # BLD AUTO: 4.33 10E6/UL (ref 3.8–5.2)
SODIUM SERPL-SCNC: 132 MMOL/L (ref 135–145)
WBC # BLD AUTO: 6.4 10E3/UL (ref 4–11)

## 2024-06-21 PROCEDURE — 258N000003 HC RX IP 258 OP 636: Mod: JZ | Performed by: INTERNAL MEDICINE

## 2024-06-21 PROCEDURE — 36415 COLL VENOUS BLD VENIPUNCTURE: CPT | Performed by: EMERGENCY MEDICINE

## 2024-06-21 PROCEDURE — 258N000003 HC RX IP 258 OP 636: Mod: JZ | Performed by: HOSPITALIST

## 2024-06-21 PROCEDURE — 74176 CT ABD & PELVIS W/O CONTRAST: CPT | Mod: MA

## 2024-06-21 PROCEDURE — 80053 COMPREHEN METABOLIC PANEL: CPT | Performed by: EMERGENCY MEDICINE

## 2024-06-21 PROCEDURE — 258N000003 HC RX IP 258 OP 636: Mod: JZ | Performed by: EMERGENCY MEDICINE

## 2024-06-21 PROCEDURE — 99223 1ST HOSP IP/OBS HIGH 75: CPT | Mod: AI | Performed by: HOSPITALIST

## 2024-06-21 PROCEDURE — 250N000009 HC RX 250: Performed by: EMERGENCY MEDICINE

## 2024-06-21 PROCEDURE — 96372 THER/PROPH/DIAG INJ SC/IM: CPT | Performed by: HOSPITALIST

## 2024-06-21 PROCEDURE — 99285 EMERGENCY DEPT VISIT HI MDM: CPT | Mod: 25

## 2024-06-21 PROCEDURE — 83690 ASSAY OF LIPASE: CPT | Performed by: EMERGENCY MEDICINE

## 2024-06-21 PROCEDURE — 120N000001 HC R&B MED SURG/OB

## 2024-06-21 PROCEDURE — 250N000011 HC RX IP 250 OP 636: Mod: JZ | Performed by: HOSPITALIST

## 2024-06-21 PROCEDURE — 94640 AIRWAY INHALATION TREATMENT: CPT

## 2024-06-21 PROCEDURE — 85025 COMPLETE CBC W/AUTO DIFF WBC: CPT | Performed by: EMERGENCY MEDICINE

## 2024-06-21 PROCEDURE — 96361 HYDRATE IV INFUSION ADD-ON: CPT

## 2024-06-21 RX ORDER — TOLTERODINE 2 MG/1
2 CAPSULE, EXTENDED RELEASE ORAL DAILY
Status: DISCONTINUED | OUTPATIENT
Start: 2024-06-22 | End: 2024-06-23 | Stop reason: HOSPADM

## 2024-06-21 RX ORDER — SOLIFENACIN SUCCINATE 5 MG/1
5 TABLET, FILM COATED ORAL DAILY
COMMUNITY

## 2024-06-21 RX ORDER — CITALOPRAM HYDROBROMIDE 10 MG/1
10 TABLET ORAL DAILY
COMMUNITY

## 2024-06-21 RX ORDER — ONDANSETRON 4 MG/1
4 TABLET, ORALLY DISINTEGRATING ORAL EVERY 6 HOURS PRN
Status: DISCONTINUED | OUTPATIENT
Start: 2024-06-21 | End: 2024-06-23 | Stop reason: HOSPADM

## 2024-06-21 RX ORDER — CITALOPRAM HYDROBROMIDE 10 MG/1
10 TABLET ORAL DAILY
Status: DISCONTINUED | OUTPATIENT
Start: 2024-06-22 | End: 2024-06-23 | Stop reason: HOSPADM

## 2024-06-21 RX ORDER — MYCOPHENOLATE MOFETIL 250 MG/1
500 CAPSULE ORAL 2 TIMES DAILY
Status: DISCONTINUED | OUTPATIENT
Start: 2024-06-21 | End: 2024-06-22

## 2024-06-21 RX ORDER — SALIVA STIMULANT COMB. NO.3
1 SPRAY, NON-AEROSOL (ML) MUCOUS MEMBRANE 4 TIMES DAILY
Status: DISCONTINUED | OUTPATIENT
Start: 2024-06-21 | End: 2024-06-23 | Stop reason: HOSPADM

## 2024-06-21 RX ORDER — ACETAMINOPHEN 325 MG/1
975 TABLET ORAL 2 TIMES DAILY PRN
Status: DISCONTINUED | OUTPATIENT
Start: 2024-06-21 | End: 2024-06-22

## 2024-06-21 RX ORDER — SODIUM CHLORIDE, SODIUM LACTATE, POTASSIUM CHLORIDE, CALCIUM CHLORIDE 600; 310; 30; 20 MG/100ML; MG/100ML; MG/100ML; MG/100ML
INJECTION, SOLUTION INTRAVENOUS CONTINUOUS
Status: DISCONTINUED | OUTPATIENT
Start: 2024-06-21 | End: 2024-06-23

## 2024-06-21 RX ORDER — HYDROMORPHONE HCL IN WATER/PF 6 MG/30 ML
0.4 PATIENT CONTROLLED ANALGESIA SYRINGE INTRAVENOUS
Status: DISCONTINUED | OUTPATIENT
Start: 2024-06-21 | End: 2024-06-23 | Stop reason: HOSPADM

## 2024-06-21 RX ORDER — FAMOTIDINE 20 MG/1
20 TABLET, FILM COATED ORAL DAILY
COMMUNITY

## 2024-06-21 RX ORDER — HYDROMORPHONE HCL IN WATER/PF 6 MG/30 ML
0.2 PATIENT CONTROLLED ANALGESIA SYRINGE INTRAVENOUS
Status: DISCONTINUED | OUTPATIENT
Start: 2024-06-21 | End: 2024-06-23 | Stop reason: HOSPADM

## 2024-06-21 RX ORDER — ONDANSETRON 2 MG/ML
4 INJECTION INTRAMUSCULAR; INTRAVENOUS EVERY 6 HOURS PRN
Status: DISCONTINUED | OUTPATIENT
Start: 2024-06-21 | End: 2024-06-23 | Stop reason: HOSPADM

## 2024-06-21 RX ORDER — PROCHLORPERAZINE MALEATE 5 MG
5 TABLET ORAL EVERY 6 HOURS PRN
Status: DISCONTINUED | OUTPATIENT
Start: 2024-06-21 | End: 2024-06-23 | Stop reason: HOSPADM

## 2024-06-21 RX ORDER — CALCIUM CARBONATE 500 MG/1
1000 TABLET, CHEWABLE ORAL 2 TIMES DAILY
Status: DISCONTINUED | OUTPATIENT
Start: 2024-06-21 | End: 2024-06-23 | Stop reason: HOSPADM

## 2024-06-21 RX ORDER — LIDOCAINE 40 MG/G
CREAM TOPICAL
Status: DISCONTINUED | OUTPATIENT
Start: 2024-06-21 | End: 2024-06-23 | Stop reason: HOSPADM

## 2024-06-21 RX ORDER — NALOXONE HYDROCHLORIDE 0.4 MG/ML
0.2 INJECTION, SOLUTION INTRAMUSCULAR; INTRAVENOUS; SUBCUTANEOUS
Status: DISCONTINUED | OUTPATIENT
Start: 2024-06-21 | End: 2024-06-23 | Stop reason: HOSPADM

## 2024-06-21 RX ORDER — SIMVASTATIN 5 MG
5 TABLET ORAL AT BEDTIME
Status: DISCONTINUED | OUTPATIENT
Start: 2024-06-21 | End: 2024-06-23 | Stop reason: HOSPADM

## 2024-06-21 RX ORDER — NALOXONE HYDROCHLORIDE 0.4 MG/ML
0.4 INJECTION, SOLUTION INTRAMUSCULAR; INTRAVENOUS; SUBCUTANEOUS
Status: DISCONTINUED | OUTPATIENT
Start: 2024-06-21 | End: 2024-06-23 | Stop reason: HOSPADM

## 2024-06-21 RX ORDER — HYDRALAZINE HYDROCHLORIDE 20 MG/ML
10 INJECTION INTRAMUSCULAR; INTRAVENOUS EVERY 4 HOURS PRN
Status: DISCONTINUED | OUTPATIENT
Start: 2024-06-21 | End: 2024-06-23 | Stop reason: HOSPADM

## 2024-06-21 RX ORDER — PREDNISONE 5 MG/1
5 TABLET ORAL DAILY
Status: DISCONTINUED | OUTPATIENT
Start: 2024-06-22 | End: 2024-06-22

## 2024-06-21 RX ORDER — PROCHLORPERAZINE 25 MG
12.5 SUPPOSITORY, RECTAL RECTAL EVERY 12 HOURS PRN
Status: DISCONTINUED | OUTPATIENT
Start: 2024-06-21 | End: 2024-06-23 | Stop reason: HOSPADM

## 2024-06-21 RX ORDER — ENOXAPARIN SODIUM 100 MG/ML
40 INJECTION SUBCUTANEOUS EVERY 24 HOURS
Status: DISCONTINUED | OUTPATIENT
Start: 2024-06-21 | End: 2024-06-22

## 2024-06-21 RX ORDER — MYCOPHENOLATE MOFETIL 250 MG/1
500 CAPSULE ORAL 2 TIMES DAILY
COMMUNITY

## 2024-06-21 RX ADMIN — SODIUM CHLORIDE, POTASSIUM CHLORIDE, SODIUM LACTATE AND CALCIUM CHLORIDE: 600; 310; 30; 20 INJECTION, SOLUTION INTRAVENOUS at 22:42

## 2024-06-21 RX ADMIN — ENOXAPARIN SODIUM 40 MG: 40 INJECTION SUBCUTANEOUS at 22:42

## 2024-06-21 RX ADMIN — SODIUM CHLORIDE 1000 ML: 9 INJECTION, SOLUTION INTRAVENOUS at 18:35

## 2024-06-21 RX ADMIN — LIDOCAINE HYDROCHLORIDE 3 ML: 40 INJECTION, SOLUTION RETROBULBAR; TOPICAL at 19:34

## 2024-06-21 ASSESSMENT — COLUMBIA-SUICIDE SEVERITY RATING SCALE - C-SSRS
2. HAVE YOU ACTUALLY HAD ANY THOUGHTS OF KILLING YOURSELF IN THE PAST MONTH?: NO
1. IN THE PAST MONTH, HAVE YOU WISHED YOU WERE DEAD OR WISHED YOU COULD GO TO SLEEP AND NOT WAKE UP?: NO
6. HAVE YOU EVER DONE ANYTHING, STARTED TO DO ANYTHING, OR PREPARED TO DO ANYTHING TO END YOUR LIFE?: NO

## 2024-06-21 ASSESSMENT — ACTIVITIES OF DAILY LIVING (ADL)
ADLS_ACUITY_SCORE: 38
ADLS_ACUITY_SCORE: 38
ADLS_ACUITY_SCORE: 33
ADLS_ACUITY_SCORE: 38
ADLS_ACUITY_SCORE: 33
ADLS_ACUITY_SCORE: 38
ADLS_ACUITY_SCORE: 38

## 2024-06-21 NOTE — PHARMACY-ADMISSION MEDICATION HISTORY
Pharmacist Admission Medication History    Admission medication history is complete. The information provided in this note is only as accurate as the sources available at the time of the update.    Information Source(s): Patient and CareEverywhere/SureScripts via in-person    Pertinent Information: Mycophenolate and prednisone not in SureScripts - patient verified dosing and reports mycophenolate generic.  -Citalopram is new Rx, she has not started yet.    Changes made to PTA medication list:  Added: citalopram, famotidine, solifenacin  Deleted: MVI  Changed: calcium from daily to BID    Allergies reviewed with patient and updates made in EHR: yes    Medication History Completed By: Cherelle Ramos [facility] 6/21/2024 6:47 PM    PTA Med List   Medication Sig Last Dose    acetaminophen (TYLENOL) 325 MG tablet Take 975 mg by mouth 2 times daily as needed for mild pain 6/21/2024 at x2    calcium carbonate (TUMS) 500 MG chewable tablet Take 2 chew tab by mouth 2 times daily Lunch & dinner 6/21/2024 at lunch    docusate sodium (COLACE) 100 MG capsule Take 100 mg by mouth every morning 6/21/2024    estradiol (ESTRACE) 0.1 MG/GM vaginal cream Place 1 g vaginally three times a week     famotidine (PEPCID) 20 MG tablet Take 20 mg by mouth daily 6/21/2024 at am    mycophenolate (GENERIC EQUIVALENT) 250 MG capsule Take 500 mg by mouth 2 times daily 6/21/2024 at am    polyethylene glycol (MIRALAX) 17 GM/Dose powder Take 17 g by mouth daily as needed     predniSONE (DELTASONE) 5 MG tablet Take 5 mg by mouth daily 6/21/2024 at am    sennosides (SENOKOT) 8.6 MG tablet Take 1 tablet by mouth every evening 6/20/2024 at pm    simvastatin (ZOCOR) 5 MG tablet Take 5 mg by mouth At Bedtime 6/20/2024 at pm    solifenacin (VESICARE) 5 MG tablet Take 5 mg by mouth daily 6/21/2024 at am    Vitamin D3 (CHOLECALCIFEROL) 25 mcg (1000 units) tablet Take 2,000 Units by mouth daily 6/21/2024 at am

## 2024-06-21 NOTE — ED PROVIDER NOTES
"  Emergency Department Note      History of Present Illness     Chief Complaint  Abdominal Pain    HPI  Maria E Webster is a 83 year old female with a history of hypertension, hyperlipidemia, and immuno deficiency who presents to the ER for abdominal pain. Patient reports episodic abdominal pain starting 3 hours ago after eating a lot broccoli that relieves a little after laying down. She describes having a small bowel movement earlier today. She recalls that 2 days ago, she had something similar happen after eating and she vomited but did not have diarrhea. She denies change in passing gas, issues today until eating broccoli, and hearing anything moving around.     Independent Historian  None    Review of External Notes  Yes-I reviewed the patient's call to the triage line earlier today, prompting her visit to this emergency department.  Past Medical History   Medical History and Problem List  Arthritis  Diverticulitis of small intestine with perforation  CMV  GERD   Hernia  Immuno deficiency   Polycystic kidney  PONV  Leukopenia   Rectocele  Hypertension  Hyperlipidemia   Myalgia   PCP  Osteoporosis   Vaginal vault prolapse  History of hematuria   Ischemia   Hyperparathyroidism     Medications  Docusate sodium   Citalopram   Cyclobenzaprine   cholecalciferol  Famotidine   Mycophenolate  Prednisone  Paxlovid   Sennosides   Simvastatin   Solifenasin     Surgical History   Colporrhaphy anterior   Cystoscopy  Hernia repair  Hemorrhoidectomy  Transplantation of kidney  Tympanoplasty  Vaginal hysterectomy  Colonoscopy  Eye surgery  Fracture surgery  Physical Exam   Patient Vitals for the past 24 hrs:   BP Temp Temp src Pulse Resp SpO2 Height Weight   06/21/24 1617 -- -- -- -- -- 99 % -- --   06/21/24 1614 (!) 175/63 98.2  F (36.8  C) Tympanic 64 20 -- 1.499 m (4' 11\") 50.8 kg (112 lb)     Physical Exam  Eye:  Pupils are equal, round, and reactive.  Extraocular movements intact.    ENT:  No rhinorrhea.  Moist mucus " membranes.  Normal tongue and tonsil.    Cardiac:  Regular rate and rhythm.  No murmurs, gallops, or rubs.    Pulmonary:  Clear to auscultation bilaterally.  No wheezes, rales, or rhonchi.    Abdomen: The abdomen is generally distended without focal tenderness.    Musculoskeletal:  Normal movement of all extremities without evidence for deficit.    Skin:  Warm and dry without rashes.    Neurologic:  Non-focal exam without asymmetric weakness or numbness.     Psychiatric:  Normal affect with appropriate interaction with examiner.    Diagnostics   Lab Results   Labs Ordered and Resulted from Time of ED Arrival to Time of ED Departure   COMPREHENSIVE METABOLIC PANEL - Abnormal       Result Value    Sodium 132 (*)     Potassium 4.2      Carbon Dioxide (CO2) 24      Anion Gap 12      Urea Nitrogen 12.9      Creatinine 0.50 (*)     GFR Estimate >90      Calcium 9.7      Chloride 96 (*)     Glucose 106 (*)     Alkaline Phosphatase 62      AST 22      ALT 15      Protein Total 7.2      Albumin 4.4      Bilirubin Total 0.6     LIPASE - Normal    Lipase 46     CBC WITH PLATELETS AND DIFFERENTIAL    WBC Count 6.4      RBC Count 4.33      Hemoglobin 13.5      Hematocrit 40.6      MCV 94      MCH 31.2      MCHC 33.3      RDW 13.2      Platelet Count 311      % Neutrophils 79      % Lymphocytes 13      % Monocytes 7      % Eosinophils 0      % Basophils 1      % Immature Granulocytes 0      NRBCs per 100 WBC 0      Absolute Neutrophils 5.0      Absolute Lymphocytes 0.8      Absolute Monocytes 0.5      Absolute Eosinophils 0.0      Absolute Basophils 0.0      Absolute Immature Granulocytes 0.0      Absolute NRBCs 0.0         Imaging  CT Abdomen Pelvis w/o Contrast   Final Result   IMPRESSION:    1.  Findings compatible with small bowel obstruction.   2.  Increasing common duct dilatation. MRCP recommended as warranted.   3.  Polycystic kidney liver disease with interval increase in size of several of the probable hemorrhagic  cysts.             Independent Interpretation  CT abdomen: This was reviewed, clearly showing signs of a small bowel obstruction.    ED Course    Medications Administered  Medications   sodium chloride 0.9% BOLUS 1,000 mL (has no administration in time range)       Discussion of Management  Admitting Hospitalist, Dr. Kemp    Social Determinants of Health adding to complexity of care  None    ED Course  ED Course as of 06/21/24 1848 Fri Jun 21, 2024   1647 I obtained the history and examined the patient as noted above.    1814 I rechecked the patient and explained findings.     1847 I spoke with Dr. Kemp, hospitalist, who accepts the patient.        Medical Decision Making / Diagnosis   CMS Diagnoses: None    MIPS     None    MDM  Maria E Webster is a 83 year old female presenting with concerns of having a crampy lower abdominal pain, present 2 days ago and then resolving, but returning again tonight.  CT is worrisome for a small bowel obstruction.  Her stomach is fairly distended.  Despite the fact she is not vomiting, I felt that an NG tube was reasonable and this was placed.  She will be admitted to the hospitalist service for ongoing treatment.  The patient's questions were answered and she is comfortable with the plan for admission as is the hospitalist team.    Disposition  The patient was admitted to the hospital.     ICD-10 Codes:    ICD-10-CM    1. Small bowel obstruction (H)  K56.609            Discharge Medications  New Prescriptions    No medications on file         Scribe Disclosure:  I, Misael Cotton, am serving as a scribe at 4:57 PM on 6/21/2024 to document services personally performed by Trierweiler, Chad A, MD based on my observations and the provider's statements to me.        Trierweiler, Chad A, MD  06/21/24 [id number]

## 2024-06-21 NOTE — ED NOTES
"[facility]  ED Nurse Handoff Report    ED Chief complaint: Abdominal Pain      ED Diagnosis:   Final diagnoses:   None       Code Status: not addressed at this time    Allergies:   Allergies   Allergen Reactions    Codeine Nausea and Vomiting    Fentanyl Nausea and Nausea and Vomiting    Morphine Nausea and Vomiting    Oxycodone-Acetaminophen Nausea and Nausea and Vomiting     Okay with plain oxycodone at low doses           Patient Story: Pt complains of lower abdominal pain for the last 3 hours. Denies N/V/D.     Focused Assessment:  Patient has been having intermittent abd pain, nausea and vomiting.     Treatments and/or interventions provided: IV, labs, CT scan, NS infusing    Patient's response to treatments and/or interventions: No change    To be done/followed up on inpatient unit:  Continue to monitor    Does this patient have any cognitive concerns?:  A/Ox4    Activity level - Baseline/Home:  Independent  Activity Level - Current:   Independent    Patient's Preferred language: English   Needed?: No    Isolation: None  Infection: Not Applicable  Patient tested for COVID 19 prior to admission: NO  Bariatric?: No    Vital Signs:   Vitals:    06/21/24 1614 06/21/24 1617   BP: (!) 175/63    Pulse: 64    Resp: 20    Temp: 98.2  F (36.8  C)    TempSrc: Tympanic    SpO2:  99%   Weight: 50.8 kg (112 lb)    Height: 1.499 m (4' 11\")        Cardiac Rhythm:     Was the PSS-3 completed:   Yes  What interventions are required if any?               Family Comments:  at bedside  OBS brochure/video discussed/provided to patient/family: N/A              Name of person given brochure if not patient: NA              Relationship to patient: NA    For the majority of the shift this patient's behavior was Green.   Behavioral interventions performed were None.    ED NURSE PHONE NUMBER: [phone]         "

## 2024-06-22 PROBLEM — K59.00 OBSTIPATION: Status: ACTIVE | Noted: 2024-06-22

## 2024-06-22 PROBLEM — K21.9 GASTROESOPHAGEAL REFLUX DISEASE WITHOUT ESOPHAGITIS: Status: ACTIVE | Noted: 2024-06-22

## 2024-06-22 LAB
ANION GAP SERPL CALCULATED.3IONS-SCNC: 10 MMOL/L (ref 7–15)
BUN SERPL-MCNC: 8.5 MG/DL (ref 8–23)
CALCIUM SERPL-MCNC: 8.5 MG/DL (ref 8.8–10.2)
CHLORIDE SERPL-SCNC: 102 MMOL/L (ref 98–107)
CREAT SERPL-MCNC: 0.48 MG/DL (ref 0.51–0.95)
DEPRECATED HCO3 PLAS-SCNC: 24 MMOL/L (ref 22–29)
EGFRCR SERPLBLD CKD-EPI 2021: >90 ML/MIN/1.73M2
ERYTHROCYTE [DISTWIDTH] IN BLOOD BY AUTOMATED COUNT: 13.2 % (ref 10–15)
GLUCOSE SERPL-MCNC: 94 MG/DL (ref 70–99)
HCT VFR BLD AUTO: 37.9 % (ref 35–47)
HGB BLD-MCNC: 12.7 G/DL (ref 11.7–15.7)
MCH RBC QN AUTO: 30.8 PG (ref 26.5–33)
MCHC RBC AUTO-ENTMCNC: 33.5 G/DL (ref 31.5–36.5)
MCV RBC AUTO: 92 FL (ref 78–100)
PLATELET # BLD AUTO: 245 10E3/UL (ref 150–450)
POTASSIUM SERPL-SCNC: 3.6 MMOL/L (ref 3.4–5.3)
RBC # BLD AUTO: 4.12 10E6/UL (ref 3.8–5.2)
SODIUM SERPL-SCNC: 136 MMOL/L (ref 135–145)
WBC # BLD AUTO: 5.2 10E3/UL (ref 4–11)

## 2024-06-22 PROCEDURE — 99203 OFFICE O/P NEW LOW 30 MIN: CPT | Mod: GC | Performed by: SURGERY

## 2024-06-22 PROCEDURE — 258N000003 HC RX IP 258 OP 636: Mod: JZ | Performed by: INTERNAL MEDICINE

## 2024-06-22 PROCEDURE — 250N000012 HC RX MED GY IP 250 OP 636 PS 637: Performed by: INTERNAL MEDICINE

## 2024-06-22 PROCEDURE — 85027 COMPLETE CBC AUTOMATED: CPT | Performed by: HOSPITALIST

## 2024-06-22 PROCEDURE — 96374 THER/PROPH/DIAG INJ IV PUSH: CPT

## 2024-06-22 PROCEDURE — 250N000011 HC RX IP 250 OP 636: Mod: JZ | Performed by: HOSPITALIST

## 2024-06-22 PROCEDURE — 80048 BASIC METABOLIC PNL TOTAL CA: CPT | Performed by: HOSPITALIST

## 2024-06-22 PROCEDURE — G0378 HOSPITAL OBSERVATION PER HR: HCPCS

## 2024-06-22 PROCEDURE — 250N000013 HC RX MED GY IP 250 OP 250 PS 637: Performed by: INTERNAL MEDICINE

## 2024-06-22 PROCEDURE — 99233 SBSQ HOSP IP/OBS HIGH 50: CPT | Performed by: INTERNAL MEDICINE

## 2024-06-22 PROCEDURE — 36415 COLL VENOUS BLD VENIPUNCTURE: CPT | Performed by: HOSPITALIST

## 2024-06-22 PROCEDURE — 258N000003 HC RX IP 258 OP 636: Mod: JZ | Performed by: HOSPITALIST

## 2024-06-22 PROCEDURE — 250N000013 HC RX MED GY IP 250 OP 250 PS 637: Performed by: HOSPITALIST

## 2024-06-22 RX ORDER — ACETAMINOPHEN 325 MG/1
650 TABLET ORAL EVERY 4 HOURS PRN
Status: DISCONTINUED | OUTPATIENT
Start: 2024-06-22 | End: 2024-06-23 | Stop reason: HOSPADM

## 2024-06-22 RX ORDER — MYCOPHENOLATE MOFETIL 250 MG/1
500 CAPSULE ORAL 2 TIMES DAILY
Status: DISCONTINUED | OUTPATIENT
Start: 2024-06-22 | End: 2024-06-23 | Stop reason: HOSPADM

## 2024-06-22 RX ORDER — ACETAMINOPHEN 500 MG
500 TABLET ORAL EVERY 4 HOURS PRN
Status: DISCONTINUED | OUTPATIENT
Start: 2024-06-22 | End: 2024-06-22

## 2024-06-22 RX ORDER — PREDNISONE 5 MG/1
5 TABLET ORAL DAILY
Status: DISCONTINUED | OUTPATIENT
Start: 2024-06-22 | End: 2024-06-23 | Stop reason: HOSPADM

## 2024-06-22 RX ORDER — SENNOSIDES 8.6 MG
1 TABLET ORAL 2 TIMES DAILY
Qty: 60 TABLET | Refills: 0 | Status: SHIPPED | OUTPATIENT
Start: 2024-06-22 | End: 2024-08-04

## 2024-06-22 RX ORDER — ACETAMINOPHEN 500 MG
1000 TABLET ORAL EVERY 4 HOURS PRN
Status: DISCONTINUED | OUTPATIENT
Start: 2024-06-22 | End: 2024-06-22

## 2024-06-22 RX ORDER — OMEPRAZOLE 20 MG/1
20 TABLET, DELAYED RELEASE ORAL DAILY
Qty: 90 TABLET | Refills: 0 | Status: SHIPPED | OUTPATIENT
Start: 2024-06-22 | End: 2024-08-04

## 2024-06-22 RX ORDER — SIMVASTATIN 5 MG
5 TABLET ORAL AT BEDTIME
COMMUNITY
Start: 2024-06-22

## 2024-06-22 RX ORDER — SENNOSIDES 8.6 MG
1 TABLET ORAL 2 TIMES DAILY
Status: DISCONTINUED | OUTPATIENT
Start: 2024-06-22 | End: 2024-06-22

## 2024-06-22 RX ORDER — SUCRALFATE ORAL 1 G/10ML
1 SUSPENSION ORAL
Status: DISCONTINUED | OUTPATIENT
Start: 2024-06-22 | End: 2024-06-23 | Stop reason: HOSPADM

## 2024-06-22 RX ORDER — PANTOPRAZOLE SODIUM 40 MG/1
40 TABLET, DELAYED RELEASE ORAL
Status: DISCONTINUED | OUTPATIENT
Start: 2024-06-22 | End: 2024-06-23 | Stop reason: HOSPADM

## 2024-06-22 RX ORDER — SUCRALFATE 1 G/1
1 TABLET ORAL 4 TIMES DAILY
Qty: 120 TABLET | Refills: 0 | Status: SHIPPED | OUTPATIENT
Start: 2024-06-22 | End: 2024-07-22

## 2024-06-22 RX ORDER — ACETAMINOPHEN 325 MG/1
975 TABLET ORAL EVERY 6 HOURS PRN
Status: DISCONTINUED | OUTPATIENT
Start: 2024-06-22 | End: 2024-06-22

## 2024-06-22 RX ORDER — POLYETHYLENE GLYCOL 3350 17 G/17G
8.5 POWDER, FOR SOLUTION ORAL DAILY
Status: DISCONTINUED | OUTPATIENT
Start: 2024-06-22 | End: 2024-06-23 | Stop reason: HOSPADM

## 2024-06-22 RX ADMIN — POLYETHYLENE GLYCOL 3350 8.5 G: 17 POWDER, FOR SOLUTION ORAL at 13:08

## 2024-06-22 RX ADMIN — CALCIUM CARBONATE (ANTACID) CHEW TAB 500 MG 1000 MG: 500 CHEW TAB at 20:22

## 2024-06-22 RX ADMIN — SUCRALFATE 1 G: 1 SUSPENSION ORAL at 21:31

## 2024-06-22 RX ADMIN — BENZOCAINE 6 MG-MENTHOL 10 MG LOZENGES 1 LOZENGE: at 08:13

## 2024-06-22 RX ADMIN — PANTOPRAZOLE SODIUM 40 MG: 40 TABLET, DELAYED RELEASE ORAL at 13:09

## 2024-06-22 RX ADMIN — SODIUM CHLORIDE, POTASSIUM CHLORIDE, SODIUM LACTATE AND CALCIUM CHLORIDE 1000 ML: 600; 310; 30; 20 INJECTION, SOLUTION INTRAVENOUS at 07:58

## 2024-06-22 RX ADMIN — MYCOPHENOLATE MOFETIL 500 MG: 250 CAPSULE ORAL at 10:46

## 2024-06-22 RX ADMIN — ACETAMINOPHEN 325 MG: 325 TABLET, FILM COATED ORAL at 13:10

## 2024-06-22 RX ADMIN — ACETAMINOPHEN 650 MG: 325 TABLET, FILM COATED ORAL at 17:59

## 2024-06-22 RX ADMIN — Medication 1 SPRAY: at 07:59

## 2024-06-22 RX ADMIN — SODIUM CHLORIDE, POTASSIUM CHLORIDE, SODIUM LACTATE AND CALCIUM CHLORIDE: 600; 310; 30; 20 INJECTION, SOLUTION INTRAVENOUS at 19:53

## 2024-06-22 RX ADMIN — MYCOPHENOLATE MOFETIL 500 MG: 250 CAPSULE ORAL at 20:22

## 2024-06-22 RX ADMIN — CALCIUM CARBONATE (ANTACID) CHEW TAB 500 MG 1000 MG: 500 CHEW TAB at 10:46

## 2024-06-22 RX ADMIN — PREDNISONE 5 MG: 5 TABLET ORAL at 10:46

## 2024-06-22 RX ADMIN — HYDRALAZINE HYDROCHLORIDE 10 MG: 20 INJECTION INTRAMUSCULAR; INTRAVENOUS at 08:08

## 2024-06-22 ASSESSMENT — ACTIVITIES OF DAILY LIVING (ADL)
ADLS_ACUITY_SCORE: 25
ADLS_ACUITY_SCORE: 29
ADLS_ACUITY_SCORE: 25
ADLS_ACUITY_SCORE: 29
ADLS_ACUITY_SCORE: 25
ADLS_ACUITY_SCORE: 29
ADLS_ACUITY_SCORE: 29
ADLS_ACUITY_SCORE: 25
ADLS_ACUITY_SCORE: 25
ADLS_ACUITY_SCORE: 29
ADLS_ACUITY_SCORE: 25

## 2024-06-22 NOTE — PROGRESS NOTES
[facility]    Medicine Progress Note - Hospitalist Service    Date of Admission:  2024    Assessment & Plan   Maria E Webster is a 83 year old female who has a history of renal transplantation and is on immunosuppressive therapy.  She presents to the ER with acute abdominal pain nausea and vomiting.  CT showed a small bowel obstruction.      Small bowel obstruction   Likely due to adhesions from prior surgery.  No significant abdominal tenderness on exam but she is distended and has high-pitched bowel sounds.  Placed  nasogastric tube to low intermittent suction  NPO  IVF   Try to avoid opioids   Monitor electrolytes   Patient had a bowel movement this morning.  General surgery consultation requested further management as per general surgery.  Appreciate their help     Polycystic kidney disease status post renal transplant   She takes prednisone and mycophenolate at home,  Continue oral suppressant medications and clamp NG after the medications are given.  Discussed with bedside RN     Hypertension   Systolic (24hrs), Av , Min:175 , Max:175    Not on antihypertensive medication at home   PRN hydralazine      Mild hyponatremia                  Sodium improved to 136 on 2024 from 132 on admission     Depression   Resume prior to admission citalopram when able      Hyperlipidemia   Resume prior to admission statin when able         Diet:  NPO  DVT Prophylaxis: Pneumatic Compression Devices  Jenkins Catheter: Not present  Lines: None     Cardiac Monitoring: None  Code Status:  FULL        Clinically Significant Risk Factors Present on Admission                  # Hypertension: Noted on problem list                           Disposition Plan     Medically Ready for Discharge: Anticipated Tomorrow if continues to improve from bowel obstruction and when okay with general surgery team    Patient has her 60th  anniversary celebration tomorrow and really wants to go home by  tomorrow       Discussed with bedside RN and patient      Esha Almonte MD  Hospitalist Service  [facility]  Securely message with [employer] (more info)  Text page via [employer] Paging/Directory   ______________________________________________________________________    Interval History   Chart reviewed.  Patient seen and examined.  Discussed with bedside RN    Patient is resting comfortably in bed.  Denies any abdominal pain or nausea currently.  Had a bowel movement this morning.  Abdomen is soft and nontender on exam.  No other acute issues    Physical Exam   Vital Signs: Temp: 97.8  F (36.6  C) Temp src: Oral BP: 103/54 Pulse: 92   Resp: 16 SpO2: 95 % O2 Device: None (Room air)    Weight: 112 lbs 0 oz    General Appearance: Alert awake, not in acute distress  Respiratory: Clear to auscultation bilaterally  Cardiovascular: Normal rate rhythm regular  GI: Soft, nontender nondistended bowel sounds positive  Skin: Warm and dry  Other: No lower extremity edema    Medical Decision Making       50 MINUTES SPENT BY ME on the date of service doing chart review, history, exam, documentation & further activities per the note.      Data     I have personally reviewed the following data over the past 24 hrs:    5.2  \   12.7   / 245     136 102 8.5 /  94   3.6 24 0.48 (L) \     ALT: 15 AST: 22 AP: 62 TBILI: 0.6   ALB: 4.4 TOT PROTEIN: 7.2 LIPASE: 46       Imaging results reviewed over the past 24 hrs:   Recent Results (from the past 24 hour(s))   CT Abdomen Pelvis w/o Contrast    Narrative    EXAM: CT ABDOMEN PELVIS W/O CONTRAST  LOCATION: [facility]  DATE: 6/21/2024    INDICATION: lower abdominal cramping after eating   eval for SBO, obstruction  COMPARISON: 1/10/2022  TECHNIQUE: CT scan of the abdomen and pelvis was performed without IV contrast. Multiplanar reformats were obtained. Dose reduction techniques were used.  CONTRAST: None.    FINDINGS:   LOWER CHEST: Chronic right  middle lobe scarring. Tiny bibasilar 1 to 2 mm pulmonary nodules not well seen previously, statistically benign.    HEPATOBILIARY: Numerous hepatic cysts are redemonstrated, some of which are calcified. New common duct dilatation, reaching 12 mm. There is associated mild intrahepatic ductal dilatation.    PANCREAS: Unremarkable    SPLEEN: 10 mm rim calcified splenic artery aneurysm, unchanged.    ADRENAL GLANDS: Unremarkable    KIDNEYS/BLADDER: Polycystic kidney disease. Numerous simple, as well as scattered probable hemorrhagic cysts again noted. These are nonspecific without the use of intravenous contrast. Several probable hemorrhagic cysts have increased in size in the   interval, including dominant hypodense cyst on the right now measuring 2.1 cm, previously 1.1 cm. Left iliac fossa renal transplant without hydronephrosis or perinephric collections..    BOWEL: Sigmoid diverticulosis. Moderate volume of fecal material throughout colon. Dilated proximal small bowel loops and stomach. Anterior/mid pelvic transition point compatible with obstruction. Distal small bowel loops are decompressed. Proximal   duodenal lipoma redemonstrated.    LYMPH NODES: No significant retroperitoneal adenopathy.    VASCULATURE: Extensive atherosclerotic calcification.    PELVIC ORGANS: No free fluid    MUSCULOSKELETAL: Old right rib fractures. 25% L3 compression fracture, new in the interval. Moderate lower lumbar spine facet arthropathy. Small fat-containing umbilical hernia.      Impression    IMPRESSION:   1.  Findings compatible with small bowel obstruction.  2.  Increasing common duct dilatation. MRCP recommended as warranted.  3.  Polycystic kidney liver disease with interval increase in size of several of the probable hemorrhagic cysts.

## 2024-06-22 NOTE — CARE PLAN
Admission    Patient arrives to room home via cart from ED  Care plan note:Patient arrived from the ed with NG-tube placed patient A&O denied pain , up independently voiding in the bathroom no other issues    Inpatient nursing criteria listed below were met:    Did you put disposition on whiteboard and in sticky note: NA  Full skin assessment done (add LDA if skin issue present). Initials of 2nd RN :Yes  Isolation education started/completed NA  Patient allergies verified with patient: Yes  Fall Risk? (Care plan updated, Education given and documented) Yes  Primary Care Plan initiated: Yes  Home medications documented in belongings flowsheet: NA  Patient belongings documented in belongings flowsheet: Yes  Reminder note (belongings/ medications) placed in discharge instructions:Yes  Admission profile/ required documentation complete: No  If patient is a 72 hour hold/Commitment are belongings removed from room and locked up? NA

## 2024-06-22 NOTE — PLAN OF CARE
Diagnosis: Small bowel obstruction  Mental Status: A&O x4  Activity/dangle: SB Ast  Diet: NPO  Pain: Denies pain  Jenkins/Voiding: Voiding in the bathroom  Tele/Restraints/Iso: N/A  02/LDA: Room air. LR infusing at 100 mL/hr  D/C Date: Pending  Other Info: NG Tube on low intermittent suction. General surgery consulted.

## 2024-06-22 NOTE — PROGRESS NOTES
Trauma/Ortho/Medical (Choose one):  Medical     Diagnosis: Small Bowel Obstruction  Mental Status:  A&O x4, forgets  Activity/dangle: SBA, gait belt, holds on to IV pole, encouraged ambulation  Diet: full liquids, will try cream of wheat for dinner  Pain: Tylenol for headache  Jenkins/Voiding: bathroom  Tele/Restraints/Iso: none  02/LDA: room air, IV fluid infusing  D/C Date: possible home tomorrow  Other Info:   -NG tube DC'd by GI this morning  -had BM early this morning, audible bowel sounds, c/o heartburn after popsicle, tolerated water and jello, otherwise denies abdominal discomfort  -

## 2024-06-22 NOTE — H&P
[facility]    History and Physical - Hospitalist Service       Date of Admission:  2024    Assessment & Plan    Maria E Webster is a 83 year old female who has a history of renal transplantation and is on immunosuppressive therapy.  She presents to the ER with acute abdominal pain nausea and vomiting.  CT showed a small bowel obstruction.     Small bowel obstruction   Likely due to adhesions from prior surgery.  No significant abdominal tenderness on exam but she is distended and has high-pitched bowel sounds.  Place nasogastric tube to low intermittent suction  NPO  IVF   Try to avoid opioids   Monitor electrolytes   Will ask general surgery to see her in the morning     Polycystic kidney disease status post renal transplant   She takes prednisone and mycophenolate at home,  If not able to take her immunosuppressants by tomorrow morning, will consult nephrologist  Monitor renal function     Hypertension   Systolic (24hrs), Av , Min:175 , Max:175    Not on antihypertensive medication at home   PRN hydralazine     Mild hyponatremia   Recent Labs   Lab 24  1843   *   Repeat in the morning after receiving intravenous fluid     Depression   Resume prior to admission citalopram when able     Hyperlipidemia   Resume prior to admission statin when able         Diet:  NPO  DVT Prophylaxis: Pneumatic Compression Devices  Jenkins Catheter: Not present  Lines: None     Cardiac Monitoring: None  Code Status:  FULL    Clinically Significant Risk Factors Present on Admission                  # Hypertension: Noted on problem list                        Disposition Plan     Medically Ready for Discharge: Anticipated in 2-4 Days           Maicol Kemp MD  Hospitalist Service  [facility]  Securely message with [employer] (more info)  Text page via [employer] Paging/Directory     ______________________________________________________________________    Chief  Complaint   Abdominal pain     History is obtained from the patient, electronic health record, and emergency department physician    History of Present Illness   Maria E Webster is a 83 year old female who has a history of renal transplantation for polycystic kidney disease.  This past Wednesday she had an episode of abdominal pain and 2 episodes of vomiting.  She felt better after this and yesterday felt quite well.  She ate and drink as usual.  Today she had lower abdominal pain again and heartburn but did not vomit.  She had a small brown bowel movement this morning.  She has not passed any flatus since this afternoon however.  She has had no diarrhea.  In the emergency room her blood pressure was 175/63, other vital signs stable.  Abdominal exam in the ER not available.  Sodium is 132 creatinine 0.50 LFTs normal.  Lipase normal.  CBC normal.    CT abdomen and pelvis  BOWEL: Sigmoid diverticulosis. Moderate volume of fecal material throughout colon. Dilated proximal small bowel loops and stomach. Anterior/mid pelvic transition point compatible with obstruction. Distal small bowel loops are decompressed. Proximal   Duodenal lipoma re-demonstrated.                                                              IMPRESSION:   1.  Findings compatible with small bowel obstruction.  2.  Increasing common duct dilatation. MRCP recommended as warranted.  3.  Polycystic kidney liver disease with interval increase in size of several of the probable hemorrhagic cysts.    She received 1 L of normal saline and an NG tube is being inserted.    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       Past Surgical History   Past Surgical History:   Procedure Laterality Date    COLPORRHAPHY ANTERIOR, POSTERIOR, COMBINED N/A 3/23/2023    Procedure: Complete Colpectomy, Anterior/Posterior Repair, Enterocele Repair, Levator Pelvic Floor Muscle Plication, Perineorrhaphy, Excision of vulvar cysts X 4;  Surgeon: Guerita Aragon MD;  Location: [facility] OR    CYSTOSCOPY      CYSTOSCOPY, SLING TRANSVAGINAL N/A 3/23/2023    Procedure: Retropubic Midurethral Sling and Cystoscopy;  Surgeon: Guerita Aragon MD;  Location: [facility] OR    HC CORRECT BUNION,SIMPLE      HEMORRHOIDECTOMY      HERNIA REPAIR      TYMPANOPLASTY  05/09/2012    Procedure:TYMPANOPLASTY; Left Tympanoplasty ; Surgeon:BROOKE SANCHEZ; Location:[facility] OR    ZC TRANSPLANTATION OF KIDNEY  2009    left    ZZC VAGINAL HYSTERECTOMY  1996    prolapse uterus, bilat. S&O    ZZHC COLONOSCOPY THRU STOMA, DIAGNOSTIC  05/2003    few diverticulae       Prior to Admission Medications   Prior to Admission Medications   Prescriptions Last Dose Informant Patient Reported? Taking?   Vitamin D3 (CHOLECALCIFEROL) 25 mcg (1000 units) tablet 6/21/2024 at am  Yes Yes   Sig: Take 2,000 Units by mouth daily   acetaminophen (TYLENOL) 325 MG tablet 6/21/2024 at x2  Yes Yes   Sig: Take 975 mg by mouth 2 times daily as needed for mild pain   calcium carbonate (TUMS) 500 MG chewable tablet 6/21/2024 at lunch  Yes Yes   Sig: Take 2 chew tab by mouth 2 times daily Lunch & dinner   citalopram (CELEXA) 10 MG tablet New Rx at Not started yet  Yes No   Sig: Take 10 mg by mouth daily   docusate sodium (COLACE) 100 MG capsule 6/21/2024  Yes Yes   Sig: Take 100 mg by mouth every morning   estradiol (ESTRACE) 0.1 MG/GM vaginal cream   Yes Yes   Sig: Place 1 g vaginally three times a week   famotidine (PEPCID) 20  MG tablet 6/21/2024 at am  Yes Yes   Sig: Take 20 mg by mouth daily   mycophenolate (GENERIC EQUIVALENT) 250 MG capsule 6/21/2024 at am  Yes Yes   Sig: Take 500 mg by mouth 2 times daily   polyethylene glycol (MIRALAX) 17 GM/Dose powder   Yes Yes   Sig: Take 17 g by mouth daily as needed   predniSONE (DELTASONE) 5 MG tablet 6/21/2024 at am Self Yes Yes   Sig: Take 5 mg by mouth daily   sennosides (SENOKOT) 8.6 MG tablet 6/20/2024 at pm  Yes Yes   Sig: Take 1 tablet by mouth every evening   simvastatin (ZOCOR) 5 MG tablet 6/20/2024 at pm  Yes Yes   Sig: Take 5 mg by mouth At Bedtime   solifenacin (VESICARE) 5 MG tablet 6/21/2024 at am  Yes Yes   Sig: Take 5 mg by mouth daily      Facility-Administered Medications: None        Review of Systems    The 10 point Review of Systems is negative other than noted in the HPI or here.     Social History   I have reviewed this patient's social history and updated it with pertinent information if needed.  Social History     Tobacco Use    Smoking status: Never    Smokeless tobacco: Never   Substance Use Topics    Alcohol use: Yes     Comment: 0-1 per week    Drug use: No         Family History   I have reviewed this patient's family history and updated it with pertinent information if needed.  Family History   Problem Relation Age of Onset    Genitourinary Problems Father         polycystic kidneys in family    Depression Mother     Hypertension Mother     Depression Sister     Depression Sister     Depression Brother     Hypertension Sister          Allergies   Allergies   Allergen Reactions    Codeine Nausea and Vomiting    Fentanyl Nausea and Nausea and Vomiting    Morphine Nausea and Vomiting    Oxycodone-Acetaminophen Nausea and Nausea and Vomiting     Okay with plain oxycodone at low doses            Physical Exam   Vital Signs: Temp: 98.2  F (36.8  C) Temp src: Tympanic BP: (!) 175/63 Pulse: 64   Resp: 20 SpO2: 99 % O2 Device: None (Room air)    Weight: 112 lbs 0  oz  Constitutional: awake, alert, cooperative, no apparent distress  Eyes: Lids and lashes normal, pupils equal, round, sclera clear, conjunctiva normal  ENT: Normocephalic, without obvious abnormality, atraumatic  Respiratory: No increased work of breathing, good air exchange, clear to auscultation bilaterally, no crackles or wheezing  Cardiovascular: regular rate and rhythm, normal S1 and S2, no S3 or S4, and no murmur noted  GI: Soft but distended with high-pitched bowel sounds throughout.  Minimal tenderness.  Skin: no rashes and no jaundice  Neuropsychiatric: General: normal, calm and normal eye contact     Medical Decision Making       MANAGEMENT DISCUSSED with the following over the past 24 hours: ER physician   NOTE(S)/MEDICAL RECORDS REVIEWED over the past 24 hours: EPIC       Data     I have personally reviewed the following data over the past 24 hrs:    6.4  \   13.5   / 311     132 (L) 96 (L) 12.9 /  106 (H)   4.2 24 0.50 (L) \     ALT: 15 AST: 22 AP: 62 TBILI: 0.6   ALB: 4.4 TOT PROTEIN: 7.2 LIPASE: 46       Imaging results reviewed over the past 24 hrs:   Recent Results (from the past 24 hour(s))   CT Abdomen Pelvis w/o Contrast    Narrative    EXAM: CT ABDOMEN PELVIS W/O CONTRAST  LOCATION: [facility]  DATE: 6/21/2024    INDICATION: lower abdominal cramping after eating   eval for SBO, obstruction  COMPARISON: 1/10/2022  TECHNIQUE: CT scan of the abdomen and pelvis was performed without IV contrast. Multiplanar reformats were obtained. Dose reduction techniques were used.  CONTRAST: None.    FINDINGS:   LOWER CHEST: Chronic right middle lobe scarring. Tiny bibasilar 1 to 2 mm pulmonary nodules not well seen previously, statistically benign.    HEPATOBILIARY: Numerous hepatic cysts are redemonstrated, some of which are calcified. New common duct dilatation, reaching 12 mm. There is associated mild intrahepatic ductal dilatation.    PANCREAS: Unremarkable    SPLEEN: 10 mm rim  calcified splenic artery aneurysm, unchanged.    ADRENAL GLANDS: Unremarkable    KIDNEYS/BLADDER: Polycystic kidney disease. Numerous simple, as well as scattered probable hemorrhagic cysts again noted. These are nonspecific without the use of intravenous contrast. Several probable hemorrhagic cysts have increased in size in the   interval, including dominant hypodense cyst on the right now measuring 2.1 cm, previously 1.1 cm. Left iliac fossa renal transplant without hydronephrosis or perinephric collections..    BOWEL: Sigmoid diverticulosis. Moderate volume of fecal material throughout colon. Dilated proximal small bowel loops and stomach. Anterior/mid pelvic transition point compatible with obstruction. Distal small bowel loops are decompressed. Proximal   duodenal lipoma redemonstrated.    LYMPH NODES: No significant retroperitoneal adenopathy.    VASCULATURE: Extensive atherosclerotic calcification.    PELVIC ORGANS: No free fluid    MUSCULOSKELETAL: Old right rib fractures. 25% L3 compression fracture, new in the interval. Moderate lower lumbar spine facet arthropathy. Small fat-containing umbilical hernia.      Impression    IMPRESSION:   1.  Findings compatible with small bowel obstruction.  2.  Increasing common duct dilatation. MRCP recommended as warranted.  3.  Polycystic kidney liver disease with interval increase in size of several of the probable hemorrhagic cysts.

## 2024-06-22 NOTE — ED NOTES
RECEIVING UNIT ED HANDOFF REVIEW    ED Nurse Handoff Report was reviewed by: Yolanda Muñoz RN on June 21, 2024 at 9:20 PM

## 2024-06-22 NOTE — ED NOTES
Patient ambulated to restroom independently, even and steady gait. Denies pain or nausea at this time.

## 2024-06-22 NOTE — PROGRESS NOTES
Trauma/Ortho/Medical (Choose one):  Medical    Diagnosis: Small Bowel Obstruction  Mental Status:  A&O x4, forgets  Activity/dangle: SBA, gait belt, holds on to IV pole, encouraged ambulation  Diet: clear liquids  Pain: Tylenol for headache  Jenkins/Voiding: bathroom  Tele/Restraints/Iso: none  02/LDA: room air, IV fluid infusing  D/C Date: possible home tomorrow  Other Info:   -NG tube DC'd by GI his morning  -had BM early this morning, audible bowel sounds, c/o heartburn after popsicle, otherwise denies abdominal discomfort

## 2024-06-22 NOTE — CONSULTS
General Surgery Consultation    Maria E Webster MRN#: [id number]   Age: 83 year old YOB: 1940     The surgical service was consulted by Maicol Kemp MD to evaluate and/or treat this patient for small bowel obstruction.    Date of Admission:          6/21/2024       Assessment:   83 year old female with PSH of kidney transplant, colporrhaphy, hernia repair who presented on 6/21/2024 with emesis and abdominal pain/heartburn and a CT scan concerning for a small bowel obstruction and moderate stool burden in her colon.    NG tube was placed and overnight she had a bowel movement. She feels much better this morning and is without pain. There is minimal coming out of her NG tube.          Plan:   -- Okay to remove NG tube and start clear liquids  -- Bowel regimen - her issue may be more related to constipation than a true obstruction, so she should discharge on stool softeners - can hold off on suppository as she had a BM overnight, but consider if she is backed up again  -- Advance diet as tolerated - consider low fiber diet to prevent further issues  -- Can discharge once tolerating diet    ___________________________________________________________________         Chief Complaint:     Chief Complaint   Patient presents with    Abdominal Pain          History of Present Illness:   83 year old female who presented on 6/21/2024 with nausea, emesis, and heartburn pain.  CT scan was obtained which was concerning for small bowel obstruction with transition point in the anterior pelvis, notably she also has a significant stool burden in her colon.  Patient reports that last Wednesday she developed nausea and vomiting, however she felt much better after vomiting and was okay during Thursday.  Friday the symptoms returned and were bad enough for her to come to the emergency department.  Since getting her CT scan an NG tube was placed, and she had a bowel movement overnight.  She feels much better this  morning.  There is very little coming out of her NG tube.         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          Past Surgical History:     Past Surgical History:   Procedure Laterality Date    COLPORRHAPHY ANTERIOR, POSTERIOR, COMBINED N/A 3/23/2023    Procedure: Complete Colpectomy, Anterior/Posterior Repair, Enterocele Repair, Levator Pelvic Floor Muscle Plication, Perineorrhaphy, Excision of vulvar cysts X 4;  Surgeon: Guerita Aragon MD;  Location: [facility] OR    CYSTOSCOPY      CYSTOSCOPY, SLING TRANSVAGINAL N/A 3/23/2023    Procedure: Retropubic Midurethral Sling and Cystoscopy;  Surgeon: Guerita Aragon MD;  Location:  OR     CORRECT BUNION,SIMPLE      HEMORRHOIDECTOMY      HERNIA REPAIR      TYMPANOPLASTY  05/09/2012    Procedure:TYMPANOPLASTY; Left Tympanoplasty ; Surgeon:BROOKE SANCHEZ; Location: OR    [facility] TRANSPLANTATION OF KIDNEY  2009    left    [facility] VAGINAL HYSTERECTOMY  1996    prolapse uterus, bilat. S&O    ZZ COLONOSCOPY THRU STOMA, DIAGNOSTIC  05/2003    few diverticulae            Medications:     Prior to Admission medications    Medication Sig Start Date End Date Taking? Authorizing Provider   acetaminophen (TYLENOL) 325 MG tablet Take 975 mg by mouth 2 times daily as needed for mild pain   Yes Reported, Patient   calcium carbonate (TUMS) 500 MG chewable tablet Take 2 chew tab by mouth 2 times daily Lunch & dinner   Yes Reported, Patient   docusate  sodium (COLACE) 100 MG capsule Take 100 mg by mouth every morning   Yes Reported, Patient   estradiol (ESTRACE) 0.1 MG/GM vaginal cream Place 1 g vaginally three times a week 9/30/22  Yes Reported, Patient   famotidine (PEPCID) 20 MG tablet Take 20 mg by mouth daily   Yes Unknown, Entered By History   mycophenolate (GENERIC EQUIVALENT) 250 MG capsule Take 500 mg by mouth 2 times daily   Yes Unknown, Entered By History   polyethylene glycol (MIRALAX) 17 GM/Dose powder Take 17 g by mouth daily as needed 1/15/21  Yes Reported, Patient   predniSONE (DELTASONE) 5 MG tablet Take 5 mg by mouth daily   Yes Unknown, Entered By History   sennosides (SENOKOT) 8.6 MG tablet Take 1 tablet by mouth every evening   Yes Reported, Patient   simvastatin (ZOCOR) 5 MG tablet Take 5 mg by mouth At Bedtime 12/17/22  Yes Reported, Patient   solifenacin (VESICARE) 5 MG tablet Take 5 mg by mouth daily   Yes Unknown, Entered By History   Vitamin D3 (CHOLECALCIFEROL) 25 mcg (1000 units) tablet Take 2,000 Units by mouth daily 2/22/21  Yes Reported, Patient   citalopram (CELEXA) 10 MG tablet Take 10 mg by mouth daily    Unknown, Entered By History          Current Medications:         Current Facility-Administered Medications   Medication Dose Route Frequency Provider Last Rate Last Admin    artificial saliva (BIOTENE MT) solution 1 spray  1 spray Mouth/Throat 4x Daily Maicol Kemp MD   1 spray at 06/22/24 7779    calcium carbonate (TUMS) chewable tablet 1,000 mg  1,000 mg Oral BID Maicol Kemp MD   1,000 mg at 06/22/24 1046    [Held by provider] citalopram (celeXA) tablet 10 mg  10 mg Oral Daily Maicol Kemp MD        enoxaparin ANTICOAGULANT (LOVENOX) injection 40 mg  40 mg Subcutaneous Q24H Maicol Kemp MD   40 mg at 06/21/24 2242    mycophenolate (GENERIC EQUIVALENT) capsule 500 mg  500 mg Oral BID Esha Almonte MD   500 mg at 06/22/24 1046    predniSONE (DELTASONE) tablet 5 mg  5 mg Oral Daily  Esha Almonte MD   5 mg at 06/22/24 1046    [Held by provider] simvastatin (ZOCOR) tablet 5 mg  5 mg Oral At Bedtime Maicol Kemp MD        sodium chloride (PF) 0.9% PF flush 3 mL  3 mL Intracatheter Q8H Maicol Kemp MD   3 mL at 06/21/24 2242    [Held by provider] tolterodine ER (DETROL LA) 24 hr capsule 2 mg  2 mg Oral Daily Maicol Kemp MD         Current Facility-Administered Medications   Medication Dose Route Frequency Provider Last Rate Last Admin    acetaminophen (TYLENOL) tablet 975 mg  975 mg Oral BID PRN Maicol Kemp MD        benzocaine-menthol (CHLORASEPTIC) 6-10 MG lozenge 1 lozenge  1 lozenge Buccal Q1H PRN Maicol Kemp MD   1 lozenge at 06/22/24 0813    hydrALAZINE (APRESOLINE) injection 10 mg  10 mg Intravenous Q4H PRN Maicol Kemp MD   10 mg at 06/22/24 0808    HYDROmorphone (DILAUDID) injection 0.2 mg  0.2 mg Intravenous Q2H PRN Maicol Kemp MD        HYDROmorphone (DILAUDID) injection 0.4 mg  0.4 mg Intravenous Q2H PRN Maicol Kemp MD        lidocaine (LMX4) cream   Topical Q1H PRN Maicol Kemp MD        lidocaine 1 % 0.1-1 mL  0.1-1 mL Other Q1H PRN Maicol Kemp MD        naloxone (NARCAN) injection 0.2 mg  0.2 mg Intravenous Q2 Min PRN Maicol Kemp MD        Or    naloxone (NARCAN) injection 0.4 mg  0.4 mg Intravenous Q2 Min PRN Maicol Kemp MD        Or    naloxone (NARCAN) injection 0.2 mg  0.2 mg Intramuscular Q2 Min PRN Maicol Kemp MD        Or    naloxone (NARCAN) injection 0.4 mg  0.4 mg Intramuscular Q2 Min PRN Maicol Kemp MD        ondansetron (ZOFRAN ODT) ODT tab 4 mg  4 mg Oral Q6H PRN Maicol Kemp MD        Or    ondansetron (ZOFRAN) injection 4 mg  4 mg Intravenous Q6H PRN Maicol Kemp MD        prochlorperazine (COMPAZINE) injection 5 mg  5 mg Intravenous Q6H PRN Maicol Kemp MD        Or     "prochlorperazine (COMPAZINE) tablet 5 mg  5 mg Oral Q6H PRN Maicol Kemp MD        Or    prochlorperazine (COMPAZINE) suppository 12.5 mg  12.5 mg Rectal Q12H PRN Maicol Kemp MD        sodium chloride (PF) 0.9% PF flush 3 mL  3 mL Intracatheter q1 min prn Maicol Kemp MD                Allergies:     Allergies   Allergen Reactions    Codeine Nausea and Vomiting    Fentanyl Nausea and Nausea and Vomiting    Morphine Nausea and Vomiting    Oxycodone-Acetaminophen Nausea and Nausea and Vomiting     Okay with plain oxycodone at low doses              Social History:     Social History     Tobacco Use    Smoking status: Never    Smokeless tobacco: Never   Substance Use Topics    Alcohol use: Yes     Comment: 0-1 per week          Family History:     Family History   Problem Relation Age of Onset    Genitourinary Problems Father         polycystic kidneys in family    Depression Mother     Hypertension Mother     Depression Sister     Depression Sister     Depression Brother     Hypertension Sister              Review of Systems:   The 12 point Review of Systems is negative other than noted in the HPI.         Physical Exam:   Blood pressure 114/57, pulse 92, temperature 98.3  F (36.8  C), temperature source Oral, resp. rate 16, height 1.499 m (4' 11\"), weight 50.8 kg (112 lb), SpO2 94%, not currently breastfeeding.  No intake/output data recorded.  General: female of stated age, no acute distress  HEENT: Normocephalic. Anecteric. Moist mucous membranes. Pupils equal.  NG tube in place  Neck: Supple without masses or lymphadenopathy  Lungs: Non-labored breathing on room air  Heart: Regular rate  Abdomen: Soft, non-tender, non-distended, well-healed left Pfannenstiel incision as well as lower midline pelvic incision  Extremities: Moves all extremities. No edema  Neurologic: No focal deficits   Skin: Warm and dry         Data:   Labs:  Recent Labs   Lab Test 06/22/24  0831 06/21/24  1843 " 03/24/23  0524 01/13/22  0708   WBC 5.2 6.4  --  8.3   HGB 12.7 13.5 10.4* 11.9   HCT 37.9 40.6  --  37.4    311  --  256     Recent Labs   Lab Test 06/22/24  0831 06/21/24  1843 09/28/23  0907 01/13/22  0708   POTASSIUM 3.6 4.2  --  3.5   CHLORIDE 102 96*  --  105   CO2 24 24  --  22   BUN 8.5 12.9  --  12   CR 0.48* 0.50* 0.56 0.58   GLC 94 106*  --  80     Recent Labs   Lab Test 06/21/24  1843 01/09/22  2049   BILITOTAL 0.6 0.4   ALT 15 26   AST 22 15   ALKPHOS 62 51   LIPASE 46 243     No lab results found.    Results for orders placed or performed during the hospital encounter of 06/21/24   CT Abdomen Pelvis w/o Contrast    Impression    IMPRESSION:   1.  Findings compatible with small bowel obstruction.  2.  Increasing common duct dilatation. MRCP recommended as warranted.  3.  Polycystic kidney liver disease with interval increase in size of several of the probable hemorrhagic cysts.          Thank you very much for this consult.     Time spent: 45 minutes total time spent on the date of this encounter doing: chart review, review of test results, patient visit/obtaining a history, physical exam, education, counseling, developing plan of care, and documenting.     I have discussed the history, physical, and plan with Dr. Ervin, who has independently interviewed and examined the patient and agrees with the plan as stated.     Darren Mg MD on 6/22/2024 at 12:22 PM  General Surgery Resident PGY4        FACULTY NOTE  I saw and evaluated the patient today June 22, 2024.  I discussed with the resident and agree with the resident s findings and plan documented in the resident s note from above. Any revisions by me are documented.    This looks to be more constipation with obstipation to me than a SBO.  She has had a BM. I have removed her NGT.  Will start clears.  Would suggest that she increases her Miralax consumption when she gets home.  Ok with her going home if she is tolerating clears without  nausea as she has her 60th wedding anniversary tomorrow.     Lucio Ervin M.D., VICKY.  Department of Surgery  [facility]

## 2024-06-23 VITALS
BODY MASS INDEX: 22.58 KG/M2 | OXYGEN SATURATION: 95 % | SYSTOLIC BLOOD PRESSURE: 144 MMHG | DIASTOLIC BLOOD PRESSURE: 74 MMHG | HEIGHT: 59 IN | RESPIRATION RATE: 16 BRPM | HEART RATE: 74 BPM | WEIGHT: 112 LBS | TEMPERATURE: 97.6 F

## 2024-06-23 PROCEDURE — 250N000012 HC RX MED GY IP 250 OP 636 PS 637: Performed by: INTERNAL MEDICINE

## 2024-06-23 PROCEDURE — 250N000013 HC RX MED GY IP 250 OP 250 PS 637: Performed by: HOSPITALIST

## 2024-06-23 PROCEDURE — 250N000013 HC RX MED GY IP 250 OP 250 PS 637: Performed by: INTERNAL MEDICINE

## 2024-06-23 PROCEDURE — G0378 HOSPITAL OBSERVATION PER HR: HCPCS

## 2024-06-23 PROCEDURE — 99239 HOSP IP/OBS DSCHRG MGMT >30: CPT | Performed by: INTERNAL MEDICINE

## 2024-06-23 RX ADMIN — CALCIUM CARBONATE (ANTACID) CHEW TAB 500 MG 1000 MG: 500 CHEW TAB at 08:07

## 2024-06-23 RX ADMIN — SUCRALFATE 1 G: 1 SUSPENSION ORAL at 06:34

## 2024-06-23 RX ADMIN — MYCOPHENOLATE MOFETIL 500 MG: 250 CAPSULE ORAL at 08:07

## 2024-06-23 RX ADMIN — POLYETHYLENE GLYCOL 3350 8.5 G: 17 POWDER, FOR SOLUTION ORAL at 08:07

## 2024-06-23 RX ADMIN — PANTOPRAZOLE SODIUM 40 MG: 40 TABLET, DELAYED RELEASE ORAL at 06:34

## 2024-06-23 RX ADMIN — PREDNISONE 5 MG: 5 TABLET ORAL at 08:07

## 2024-06-23 ASSESSMENT — ACTIVITIES OF DAILY LIVING (ADL)
ADLS_ACUITY_SCORE: 29
ADLS_ACUITY_SCORE: 25
ADLS_ACUITY_SCORE: 29
ADLS_ACUITY_SCORE: 25

## 2024-06-23 NOTE — PROGRESS NOTES
Discharge instructions reviewed w/ patient.  Discharge meds to be picked up at own pharmacy.  No further questions at this time.   Patients  at the bedside.  Tolerated full liquid diet, will slowly transition to low fiber diet.  Up independently in room.  Discharging to home, transport by pt's .

## 2024-06-23 NOTE — DISCHARGE SUMMARY
[facility]  Hospitalist Discharge Summary      Date of Admission:  2024  Date of Discharge:  2024  Discharging Provider: Esha Almonte MD  Discharge Service: Hospitalist Service    Discharge Diagnoses   Partial small bowel obstruction secondary to obstipation  Obstipation  History of renal transplant on chronic immunosuppression  Please see below    Clinically Significant Risk Factors          Follow-ups Needed After Discharge   Follow-up Appointments     Follow-up and recommended labs and tests       F/u with PCP in 1week            Unresulted Labs Ordered in the Past 30 Days of this Admission       No orders found from 2024 to 2024.            Discharge Disposition   Discharged to home  Condition at discharge: Stable    Hospital Course   Maria E Webster is a 83 year old female who has a history of renal transplantation and is on immunosuppressive therapy.  She presents to the ER with acute abdominal pain nausea and vomiting.  CT showed a small bowel obstruction.      Small bowel obstruction   Obstipation  Likely due to adhesions from prior surgery.  No significant abdominal tenderness on exam but she is distended and has high-pitched bowel sounds.  Patient was made n.p.o. on admission and with NG tube placement to low  intermittent suction on admission  Patient's symptoms improved.  Patient had a bowel movement on 2024 AM  NG tube removed.  Diet was advanced as tolerated per general surgery  Patient is tolerating low fiber diet.  Abdominal pain much improved abdomen is soft and nontender on exam    Will discharge to home in stable condition with family    Polycystic kidney disease status post renal transplant   She takes prednisone and mycophenolate at home,  Oral chronic medications restarted     Hypertension   Systolic (24hrs), Av , Min:175 , Max:175    Not on antihypertensive medication at home   PRN hydralazine   Blood pressure is better controlled now      Mild hyponatremia                         Sodium improved to 136 on 6/22/2024 from 132 on admission     Depression   Resume prior to admission citalopram when able      Hyperlipidemia   Resume prior to admission statin when able         Diet:  NPO  DVT Prophylaxis: Pneumatic Compression Devices  Jenkins Catheter: Not present  Lines: None     Cardiac Monitoring: None  Code Status:  FULL        Clinically Significant Risk Factors Present on Admission                  # Hypertension: Noted on problem list                                     Disposition Plan     Medically Ready for Discharge: Discharged to home today with her  in stable condition           Discussed with bedside RN and patient and her         Esha Almonte MD  Hospitalist Service  [facility]  Securely message with [employer] (more info)  Text page via [employer] Paging/Directory     Consultations This Hospital Stay   SURGERY GENERAL IP CONSULT    Code Status   Full Code    Time Spent on this Encounter   I, Esha Almonte MD, personally saw the patient today and spent greater than 30 minutes discharging this patient.       Esha Almonte MD  [facility]  [address] [place] [id number]  Phone: [phone]  Fax: [phone]  ______________________________________________________________________    Physical Exam   Vital Signs: Temp: 97.6  F (36.4  C) Temp src: Oral BP: (!) 144/74 Pulse: 74   Resp: 16 SpO2: 95 % O2 Device: None (Room air)    Weight: 112 lbs 0 oz  General Appearance: Alert awake, not in acute distress  Respiratory: Clear to auscultation bilaterally  Cardiovascular: Normal rate rhythm regular  GI: Soft, nontender nondistended bowel sounds positive, no guarding rigidity or rebound noted  Skin: Warm and dry  Other: No lower extremity edema noted       Primary Care Physician   Maria Del Rosario Reynolds    Discharge Orders      Reason for your hospital stay    Partial SBO with  constipation     Follow-up and recommended labs and tests     F/u with PCP in 1week     Activity    Your activity upon discharge: activity as tolerated     Diet    Follow this diet upon discharge: Low fiber diet for 1week then regular diet       Significant Results and Procedures   Most Recent 3 CBC's:  Recent Labs   Lab Test 06/22/24  0831 06/21/24  1843 03/24/23  0524 01/13/22  0708   WBC 5.2 6.4  --  8.3   HGB 12.7 13.5 10.4* 11.9   MCV 92 94  --  92    311  --  256     Most Recent 3 BMP's:  Recent Labs   Lab Test 06/22/24  0831 06/21/24  1843 09/28/23  0907 01/13/22  0708    132*  --  134   POTASSIUM 3.6 4.2  --  3.5   CHLORIDE 102 96*  --  105   CO2 24 24  --  22   BUN 8.5 12.9  --  12   CR 0.48* 0.50* 0.56 0.58   ANIONGAP 10 12  --  7   BERNARDA 8.5* 9.7 9.1 8.0*   GLC 94 106*  --  80     Most Recent 2 LFT's:  Recent Labs   Lab Test 06/21/24  1843 01/09/22  2049   AST 22 15   ALT 15 26   ALKPHOS 62 51   BILITOTAL 0.6 0.4     Most Recent 3 INR's:No lab results found.  Most Recent INR's and Anticoagulation Dosing History:  Anticoagulation Dose History           No data to display              Most Recent 3 Creatinines:  Recent Labs   Lab Test 06/22/24  0831 06/21/24 1843 09/28/23  0907   CR 0.48* 0.50* 0.56     Most Recent 3 Hemoglobins:  Recent Labs   Lab Test 06/22/24  0831 06/21/24 1843 03/24/23  0524   HGB 12.7 13.5 10.4*     Most Recent 3 Troponin's:No lab results found.  Most Recent 3 BNP's:No lab results found.  Most Recent D-dimer:No lab results found.  Most Recent Cholesterol Panel:No lab results found.  7-Day Micro Results       No results found for the last 168 hours.          Most Recent TSH and T4:No lab results found.  Most Recent Hemoglobin A1c:No lab results found.  Most Recent 6 glucoses:  Recent Labs   Lab Test 06/22/24  0831 06/21/24  1843 01/13/22  0708 01/11/22  1125 01/09/22  2049   GLC 94 106* 80 102* 142*     Most Recent Urinalysis:  Recent Labs   Lab Test 07/19/17  1209   COLOR  Yellow   APPEARANCE Clear   URINEGLC Negative   URINEBILI Negative   URINEKETONE Negative   SG 1.020   UBLD Negative   URINEPH 6.0   PROTEIN Negative   UROBILINOGEN 0.2   NITRITE Negative   LEUKEST Negative     Most Recent ABG:No lab results found.  Most Recent ESR & CRP:No lab results found.  Most Recent Anemia Panel:  Recent Labs   Lab Test 06/22/24  0831   WBC 5.2   HGB 12.7   HCT 37.9   MCV 92        Most Recent CPK:No lab results found.,   Results for orders placed or performed during the hospital encounter of 06/21/24   CT Abdomen Pelvis w/o Contrast    Narrative    EXAM: CT ABDOMEN PELVIS W/O CONTRAST  LOCATION: [facility]  DATE: 6/21/2024    INDICATION: lower abdominal cramping after eating   eval for SBO, obstruction  COMPARISON: 1/10/2022  TECHNIQUE: CT scan of the abdomen and pelvis was performed without IV contrast. Multiplanar reformats were obtained. Dose reduction techniques were used.  CONTRAST: None.    FINDINGS:   LOWER CHEST: Chronic right middle lobe scarring. Tiny bibasilar 1 to 2 mm pulmonary nodules not well seen previously, statistically benign.    HEPATOBILIARY: Numerous hepatic cysts are redemonstrated, some of which are calcified. New common duct dilatation, reaching 12 mm. There is associated mild intrahepatic ductal dilatation.    PANCREAS: Unremarkable    SPLEEN: 10 mm rim calcified splenic artery aneurysm, unchanged.    ADRENAL GLANDS: Unremarkable    KIDNEYS/BLADDER: Polycystic kidney disease. Numerous simple, as well as scattered probable hemorrhagic cysts again noted. These are nonspecific without the use of intravenous contrast. Several probable hemorrhagic cysts have increased in size in the   interval, including dominant hypodense cyst on the right now measuring 2.1 cm, previously 1.1 cm. Left iliac fossa renal transplant without hydronephrosis or perinephric collections..    BOWEL: Sigmoid diverticulosis. Moderate volume of fecal material  throughout colon. Dilated proximal small bowel loops and stomach. Anterior/mid pelvic transition point compatible with obstruction. Distal small bowel loops are decompressed. Proximal   duodenal lipoma redemonstrated.    LYMPH NODES: No significant retroperitoneal adenopathy.    VASCULATURE: Extensive atherosclerotic calcification.    PELVIC ORGANS: No free fluid    MUSCULOSKELETAL: Old right rib fractures. 25% L3 compression fracture, new in the interval. Moderate lower lumbar spine facet arthropathy. Small fat-containing umbilical hernia.      Impression    IMPRESSION:   1.  Findings compatible with small bowel obstruction.  2.  Increasing common duct dilatation. MRCP recommended as warranted.  3.  Polycystic kidney liver disease with interval increase in size of several of the probable hemorrhagic cysts.         Discharge Medications   Current Discharge Medication List        START taking these medications    Details   omeprazole (PRILOSEC OTC) 20 MG EC tablet Take 1 tablet (20 mg) by mouth daily  Qty: 90 tablet, Refills: 0    Associated Diagnoses: Gastroesophageal reflux disease without esophagitis      sucralfate (CARAFATE) 1 GM tablet Take 1 tablet (1 g) by mouth 4 times daily for 30 days  Qty: 120 tablet, Refills: 0    Associated Diagnoses: Gastroesophageal reflux disease without esophagitis           CONTINUE these medications which have CHANGED    Details   sennosides (SENOKOT) 8.6 MG tablet Take 1 tablet by mouth 2 times daily Can take up to 2tablets BID if no BM in 1day  Qty: 60 tablet, Refills: 0    Associated Diagnoses: Small bowel obstruction (H); Obstipation      simvastatin (ZOCOR) 5 MG tablet Take 1 tablet (5 mg) by mouth at bedtime           CONTINUE these medications which have NOT CHANGED    Details   acetaminophen (TYLENOL) 325 MG tablet Take 975 mg by mouth 2 times daily as needed for mild pain      calcium carbonate (TUMS) 500 MG chewable tablet Take 2 chew tab by mouth 2 times daily Lunch &  dinner      estradiol (ESTRACE) 0.1 MG/GM vaginal cream Place 1 g vaginally three times a week      famotidine (PEPCID) 20 MG tablet Take 20 mg by mouth daily      mycophenolate (GENERIC EQUIVALENT) 250 MG capsule Take 500 mg by mouth 2 times daily      polyethylene glycol (MIRALAX) 17 GM/Dose powder Take 17 g by mouth daily as needed      predniSONE (DELTASONE) 5 MG tablet Take 5 mg by mouth daily      solifenacin (VESICARE) 5 MG tablet Take 5 mg by mouth daily      Vitamin D3 (CHOLECALCIFEROL) 25 mcg (1000 units) tablet Take 2,000 Units by mouth daily      citalopram (CELEXA) 10 MG tablet Take 10 mg by mouth daily           STOP taking these medications       docusate sodium (COLACE) 100 MG capsule Comments:   Reason for Stopping:             Allergies   Allergies   Allergen Reactions    Codeine Nausea and Vomiting    Fentanyl Nausea and Nausea and Vomiting    Morphine Nausea and Vomiting    Oxycodone-Acetaminophen Nausea and Nausea and Vomiting     Okay with plain oxycodone at low doses

## 2024-06-23 NOTE — PLAN OF CARE
Diagnosis: Small bowel obstruction  Mental Status: A&Ox4, forgetful  Activity/dangle up SBA GB  Diet: full liquids  Pain: denies  Jenkins/Voiding: BR  02/LDA: Ra. Iv infusing  D/C Date: home 6/23

## 2024-06-25 DIAGNOSIS — M81.0 SENILE OSTEOPOROSIS: Primary | ICD-10-CM

## 2024-06-25 RX ORDER — MEPERIDINE HYDROCHLORIDE 25 MG/ML
25 INJECTION INTRAMUSCULAR; INTRAVENOUS; SUBCUTANEOUS EVERY 30 MIN PRN
Status: CANCELLED | OUTPATIENT
Start: 2024-06-25

## 2024-06-25 RX ORDER — METHYLPREDNISOLONE SODIUM SUCCINATE 125 MG/2ML
125 INJECTION, POWDER, LYOPHILIZED, FOR SOLUTION INTRAMUSCULAR; INTRAVENOUS
Status: CANCELLED
Start: 2024-06-25

## 2024-06-25 RX ORDER — DIPHENHYDRAMINE HYDROCHLORIDE 50 MG/ML
50 INJECTION INTRAMUSCULAR; INTRAVENOUS
Status: CANCELLED
Start: 2024-06-25

## 2024-06-25 RX ORDER — ALBUTEROL SULFATE 0.83 MG/ML
2.5 SOLUTION RESPIRATORY (INHALATION)
Status: CANCELLED | OUTPATIENT
Start: 2024-06-25

## 2024-06-25 RX ORDER — EPINEPHRINE 1 MG/ML
0.3 INJECTION, SOLUTION, CONCENTRATE INTRAVENOUS EVERY 5 MIN PRN
Status: CANCELLED | OUTPATIENT
Start: 2024-06-25

## 2024-06-25 RX ORDER — ALBUTEROL SULFATE 90 UG/1
1-2 AEROSOL, METERED RESPIRATORY (INHALATION)
Status: CANCELLED
Start: 2024-06-25

## 2024-07-19 ENCOUNTER — ALLIED HEALTH/NURSE VISIT (OUTPATIENT)
Dept: INFUSION THERAPY | Facility: CLINIC | Age: 84
End: 2024-07-19
Attending: INTERNAL MEDICINE
Payer: MEDICARE

## 2024-07-19 VITALS
SYSTOLIC BLOOD PRESSURE: 117 MMHG | DIASTOLIC BLOOD PRESSURE: 74 MMHG | OXYGEN SATURATION: 97 % | RESPIRATION RATE: 16 BRPM | TEMPERATURE: 98 F | HEART RATE: 72 BPM

## 2024-07-19 DIAGNOSIS — M81.0 SENILE OSTEOPOROSIS: Primary | ICD-10-CM

## 2024-07-19 PROCEDURE — 250N000011 HC RX IP 250 OP 636: Performed by: INTERNAL MEDICINE

## 2024-07-19 PROCEDURE — 96372 THER/PROPH/DIAG INJ SC/IM: CPT | Performed by: INTERNAL MEDICINE

## 2024-07-19 RX ORDER — METHYLPREDNISOLONE SODIUM SUCCINATE 125 MG/2ML
125 INJECTION, POWDER, LYOPHILIZED, FOR SOLUTION INTRAMUSCULAR; INTRAVENOUS
Start: 2025-01-15

## 2024-07-19 RX ORDER — ALBUTEROL SULFATE 0.83 MG/ML
2.5 SOLUTION RESPIRATORY (INHALATION)
OUTPATIENT
Start: 2025-01-15

## 2024-07-19 RX ORDER — ALBUTEROL SULFATE 90 UG/1
1-2 AEROSOL, METERED RESPIRATORY (INHALATION)
Start: 2025-01-15

## 2024-07-19 RX ORDER — MEPERIDINE HYDROCHLORIDE 25 MG/ML
25 INJECTION INTRAMUSCULAR; INTRAVENOUS; SUBCUTANEOUS EVERY 30 MIN PRN
OUTPATIENT
Start: 2025-01-15

## 2024-07-19 RX ORDER — DIPHENHYDRAMINE HYDROCHLORIDE 50 MG/ML
50 INJECTION INTRAMUSCULAR; INTRAVENOUS
Start: 2025-01-15

## 2024-07-19 RX ORDER — EPINEPHRINE 1 MG/ML
0.3 INJECTION, SOLUTION INTRAMUSCULAR; SUBCUTANEOUS EVERY 5 MIN PRN
OUTPATIENT
Start: 2025-01-15

## 2024-07-19 RX ADMIN — DENOSUMAB 60 MG: 60 INJECTION SUBCUTANEOUS at 12:29

## 2024-07-19 NOTE — PROGRESS NOTES
Nursing Note:  Maria E Ronquilloa Eleanor presents today for Prolia.    Patient seen by provider today: No   present during visit today: Not Applicable.    Note: Patient tolerated Prolia injection in right arm without issue.     Intravenous Access:  No Intravenous access/labs at this visit.    Discharge Plan:   Patient was discharged to home.     Caryn Loza RN

## 2024-08-04 ENCOUNTER — HOSPITAL ENCOUNTER (OUTPATIENT)
Facility: CLINIC | Age: 84
Setting detail: OBSERVATION
Discharge: HOME | End: 2024-08-06
Attending: EMERGENCY MEDICINE | Admitting: INTERNAL MEDICINE
Payer: MEDICARE

## 2024-08-04 ENCOUNTER — APPOINTMENT (OUTPATIENT)
Dept: ULTRASOUND IMAGING | Facility: CLINIC | Age: 84
End: 2024-08-04
Attending: EMERGENCY MEDICINE
Payer: MEDICARE

## 2024-08-04 ENCOUNTER — APPOINTMENT (OUTPATIENT)
Dept: CT IMAGING | Facility: CLINIC | Age: 84
End: 2024-08-04
Attending: EMERGENCY MEDICINE
Payer: MEDICARE

## 2024-08-04 DIAGNOSIS — S22.31XD CLOSED FRACTURE OF ONE RIB OF RIGHT SIDE WITH ROUTINE HEALING, SUBSEQUENT ENCOUNTER: Primary | ICD-10-CM

## 2024-08-04 DIAGNOSIS — R10.11 RIGHT UPPER QUADRANT ABDOMINAL PAIN: ICD-10-CM

## 2024-08-04 DIAGNOSIS — K80.50 BILIARY COLIC: ICD-10-CM

## 2024-08-04 PROBLEM — R10.9 ABDOMINAL PAIN: Status: ACTIVE | Noted: 2024-08-04

## 2024-08-04 LAB
ALBUMIN SERPL BCG-MCNC: 3.5 G/DL (ref 3.5–5.2)
ALBUMIN UR-MCNC: 100 MG/DL
ALP SERPL-CCNC: 124 U/L (ref 40–150)
ALT SERPL W P-5'-P-CCNC: 18 U/L (ref 0–50)
ANION GAP SERPL CALCULATED.3IONS-SCNC: 8 MMOL/L (ref 7–15)
APPEARANCE UR: ABNORMAL
AST SERPL W P-5'-P-CCNC: 13 U/L (ref 0–45)
ATRIAL RATE - MUSE: 92 BPM
BACTERIA #/AREA URNS HPF: ABNORMAL /HPF
BASE EXCESS BLDV CALC-SCNC: -1 MMOL/L (ref -3–3)
BASOPHILS # BLD AUTO: 0 10E3/UL (ref 0–0.2)
BASOPHILS NFR BLD AUTO: 0 %
BILIRUB SERPL-MCNC: 0.6 MG/DL
BILIRUB UR QL STRIP: NEGATIVE
BUN SERPL-MCNC: 22.1 MG/DL (ref 8–23)
CALCIUM SERPL-MCNC: 9.1 MG/DL (ref 8.8–10.4)
CHLORIDE SERPL-SCNC: 94 MMOL/L (ref 98–107)
COLOR UR AUTO: YELLOW
CREAT SERPL-MCNC: 0.55 MG/DL (ref 0.51–0.95)
CREAT UR-MCNC: 157.9 MG/DL
DIASTOLIC BLOOD PRESSURE - MUSE: NORMAL MMHG
EGFRCR SERPLBLD CKD-EPI 2021: 90 ML/MIN/1.73M2
EOSINOPHIL # BLD AUTO: 0 10E3/UL (ref 0–0.7)
EOSINOPHIL NFR BLD AUTO: 0 %
ERYTHROCYTE [DISTWIDTH] IN BLOOD BY AUTOMATED COUNT: 13.7 % (ref 10–15)
FRACT EXCRET NA UR+SERPL-RTO: 0.1 %
GLUCOSE SERPL-MCNC: 123 MG/DL (ref 70–99)
GLUCOSE UR STRIP-MCNC: NEGATIVE MG/DL
HCO3 BLDV-SCNC: 23 MMOL/L (ref 21–28)
HCO3 SERPL-SCNC: 26 MMOL/L (ref 22–29)
HCT VFR BLD AUTO: 34.4 % (ref 35–47)
HGB BLD-MCNC: 11.6 G/DL (ref 11.7–15.7)
HGB UR QL STRIP: NEGATIVE
HOLD SPECIMEN: NORMAL
HOLD SPECIMEN: NORMAL
IMM GRANULOCYTES # BLD: 0.1 10E3/UL
IMM GRANULOCYTES NFR BLD: 1 %
INTERPRETATION ECG - MUSE: NORMAL
KETONES UR STRIP-MCNC: 40 MG/DL
LACTATE BLD-SCNC: 0.6 MMOL/L
LEUKOCYTE ESTERASE UR QL STRIP: ABNORMAL
LIPASE SERPL-CCNC: 18 U/L (ref 13–60)
LYMPHOCYTES # BLD AUTO: 0.2 10E3/UL (ref 0.8–5.3)
LYMPHOCYTES NFR BLD AUTO: 4 %
MCH RBC QN AUTO: 31.4 PG (ref 26.5–33)
MCHC RBC AUTO-ENTMCNC: 33.7 G/DL (ref 31.5–36.5)
MCV RBC AUTO: 93 FL (ref 78–100)
MONOCYTES # BLD AUTO: 0.6 10E3/UL (ref 0–1.3)
MONOCYTES NFR BLD AUTO: 9 %
MUCOUS THREADS #/AREA URNS LPF: PRESENT /LPF
NEUTROPHILS # BLD AUTO: 5.6 10E3/UL (ref 1.6–8.3)
NEUTROPHILS NFR BLD AUTO: 86 %
NITRATE UR QL: NEGATIVE
NRBC # BLD AUTO: 0 10E3/UL
NRBC BLD AUTO-RTO: 0 /100
P AXIS - MUSE: 46 DEGREES
PCO2 BLDV: 34 MM HG (ref 40–50)
PH BLDV: 7.43 [PH] (ref 7.32–7.43)
PH UR STRIP: 6.5 [PH] (ref 5–7)
PLATELET # BLD AUTO: 248 10E3/UL (ref 150–450)
PO2 BLDV: 32 MM HG (ref 25–47)
POTASSIUM SERPL-SCNC: 4 MMOL/L (ref 3.4–5.3)
PR INTERVAL - MUSE: 130 MS
PROT SERPL-MCNC: 6.6 G/DL (ref 6.4–8.3)
QRS DURATION - MUSE: 74 MS
QT - MUSE: 320 MS
QTC - MUSE: 395 MS
R AXIS - MUSE: 63 DEGREES
RBC # BLD AUTO: 3.7 10E6/UL (ref 3.8–5.2)
RBC URINE: 2 /HPF
SAO2 % BLDV: 64 % (ref 70–75)
SODIUM SERPL-SCNC: 128 MMOL/L (ref 135–145)
SODIUM UR-SCNC: 21 MMOL/L
SP GR UR STRIP: 1.03 (ref 1–1.03)
SQUAMOUS EPITHELIAL: 5 /HPF
SYSTOLIC BLOOD PRESSURE - MUSE: NORMAL MMHG
T AXIS - MUSE: 18 DEGREES
UROBILINOGEN UR STRIP-MCNC: 8 MG/DL
VENTRICULAR RATE- MUSE: 92 BPM
WBC # BLD AUTO: 6.5 10E3/UL (ref 4–11)
WBC URINE: 11 /HPF

## 2024-08-04 PROCEDURE — 76705 ECHO EXAM OF ABDOMEN: CPT

## 2024-08-04 PROCEDURE — G0378 HOSPITAL OBSERVATION PER HR: HCPCS

## 2024-08-04 PROCEDURE — 258N000003 HC RX IP 258 OP 636: Performed by: INTERNAL MEDICINE

## 2024-08-04 PROCEDURE — 93005 ELECTROCARDIOGRAM TRACING: CPT

## 2024-08-04 PROCEDURE — 96374 THER/PROPH/DIAG INJ IV PUSH: CPT

## 2024-08-04 PROCEDURE — 81001 URINALYSIS AUTO W/SCOPE: CPT | Performed by: INTERNAL MEDICINE

## 2024-08-04 PROCEDURE — 82803 BLOOD GASES ANY COMBINATION: CPT

## 2024-08-04 PROCEDURE — 74176 CT ABD & PELVIS W/O CONTRAST: CPT | Mod: MA

## 2024-08-04 PROCEDURE — 83690 ASSAY OF LIPASE: CPT | Performed by: EMERGENCY MEDICINE

## 2024-08-04 PROCEDURE — 99285 EMERGENCY DEPT VISIT HI MDM: CPT | Mod: 25

## 2024-08-04 PROCEDURE — 82570 ASSAY OF URINE CREATININE: CPT | Performed by: INTERNAL MEDICINE

## 2024-08-04 PROCEDURE — 96361 HYDRATE IV INFUSION ADD-ON: CPT

## 2024-08-04 PROCEDURE — 250N000012 HC RX MED GY IP 250 OP 636 PS 637: Performed by: INTERNAL MEDICINE

## 2024-08-04 PROCEDURE — 99222 1ST HOSP IP/OBS MODERATE 55: CPT | Mod: AI | Performed by: INTERNAL MEDICINE

## 2024-08-04 PROCEDURE — 96375 TX/PRO/DX INJ NEW DRUG ADDON: CPT

## 2024-08-04 PROCEDURE — 36415 COLL VENOUS BLD VENIPUNCTURE: CPT | Performed by: EMERGENCY MEDICINE

## 2024-08-04 PROCEDURE — 80053 COMPREHEN METABOLIC PANEL: CPT | Performed by: EMERGENCY MEDICINE

## 2024-08-04 PROCEDURE — 250N000013 HC RX MED GY IP 250 OP 250 PS 637: Performed by: INTERNAL MEDICINE

## 2024-08-04 PROCEDURE — 99222 1ST HOSP IP/OBS MODERATE 55: CPT | Performed by: SURGERY

## 2024-08-04 PROCEDURE — 250N000011 HC RX IP 250 OP 636: Performed by: EMERGENCY MEDICINE

## 2024-08-04 PROCEDURE — 85004 AUTOMATED DIFF WBC COUNT: CPT | Performed by: EMERGENCY MEDICINE

## 2024-08-04 PROCEDURE — 87086 URINE CULTURE/COLONY COUNT: CPT | Performed by: INTERNAL MEDICINE

## 2024-08-04 PROCEDURE — 258N000003 HC RX IP 258 OP 636: Performed by: EMERGENCY MEDICINE

## 2024-08-04 RX ORDER — PREDNISONE 5 MG/1
5 TABLET ORAL DAILY
Status: DISCONTINUED | OUTPATIENT
Start: 2024-08-04 | End: 2024-08-06 | Stop reason: HOSPADM

## 2024-08-04 RX ORDER — NALOXONE HYDROCHLORIDE 0.4 MG/ML
0.4 INJECTION, SOLUTION INTRAMUSCULAR; INTRAVENOUS; SUBCUTANEOUS
Status: DISCONTINUED | OUTPATIENT
Start: 2024-08-04 | End: 2024-08-06 | Stop reason: HOSPADM

## 2024-08-04 RX ORDER — MYCOPHENOLATE MOFETIL 250 MG/1
500 CAPSULE ORAL 2 TIMES DAILY
Status: DISCONTINUED | OUTPATIENT
Start: 2024-08-04 | End: 2024-08-06 | Stop reason: HOSPADM

## 2024-08-04 RX ORDER — CHOLECALCIFEROL (VITAMIN D3) 50 MCG
1 TABLET ORAL DAILY
COMMUNITY

## 2024-08-04 RX ORDER — AMOXICILLIN 250 MG
1 CAPSULE ORAL 2 TIMES DAILY PRN
Status: DISCONTINUED | OUTPATIENT
Start: 2024-08-04 | End: 2024-08-06 | Stop reason: HOSPADM

## 2024-08-04 RX ORDER — ACETAMINOPHEN 325 MG/1
975 TABLET ORAL 2 TIMES DAILY PRN
Status: DISCONTINUED | OUTPATIENT
Start: 2024-08-04 | End: 2024-08-06 | Stop reason: HOSPADM

## 2024-08-04 RX ORDER — HYDROMORPHONE HCL IN WATER/PF 6 MG/30 ML
0.2 PATIENT CONTROLLED ANALGESIA SYRINGE INTRAVENOUS
Status: ACTIVE | OUTPATIENT
Start: 2024-08-04 | End: 2024-08-04

## 2024-08-04 RX ORDER — CITALOPRAM HYDROBROMIDE 10 MG/1
10 TABLET ORAL DAILY
Status: DISCONTINUED | OUTPATIENT
Start: 2024-08-04 | End: 2024-08-06 | Stop reason: HOSPADM

## 2024-08-04 RX ORDER — TOLTERODINE 2 MG/1
2 CAPSULE, EXTENDED RELEASE ORAL AT BEDTIME
Status: DISCONTINUED | OUTPATIENT
Start: 2024-08-04 | End: 2024-08-06 | Stop reason: HOSPADM

## 2024-08-04 RX ORDER — ONDANSETRON 4 MG/1
4 TABLET, ORALLY DISINTEGRATING ORAL EVERY 6 HOURS PRN
Status: DISCONTINUED | OUTPATIENT
Start: 2024-08-04 | End: 2024-08-06 | Stop reason: HOSPADM

## 2024-08-04 RX ORDER — NALOXONE HYDROCHLORIDE 0.4 MG/ML
0.2 INJECTION, SOLUTION INTRAMUSCULAR; INTRAVENOUS; SUBCUTANEOUS
Status: DISCONTINUED | OUTPATIENT
Start: 2024-08-04 | End: 2024-08-06 | Stop reason: HOSPADM

## 2024-08-04 RX ORDER — SODIUM CHLORIDE, SODIUM LACTATE, POTASSIUM CHLORIDE, CALCIUM CHLORIDE 600; 310; 30; 20 MG/100ML; MG/100ML; MG/100ML; MG/100ML
INJECTION, SOLUTION INTRAVENOUS CONTINUOUS
Status: DISCONTINUED | OUTPATIENT
Start: 2024-08-04 | End: 2024-08-05

## 2024-08-04 RX ORDER — ONDANSETRON 2 MG/ML
4 INJECTION INTRAMUSCULAR; INTRAVENOUS EVERY 6 HOURS PRN
Status: DISCONTINUED | OUTPATIENT
Start: 2024-08-04 | End: 2024-08-06 | Stop reason: HOSPADM

## 2024-08-04 RX ORDER — SODIUM CHLORIDE 9 MG/ML
INJECTION, SOLUTION INTRAVENOUS CONTINUOUS
Status: DISCONTINUED | OUTPATIENT
Start: 2024-08-04 | End: 2024-08-04

## 2024-08-04 RX ORDER — ACETAMINOPHEN 650 MG/1
650 SUPPOSITORY RECTAL EVERY 4 HOURS PRN
Status: DISCONTINUED | OUTPATIENT
Start: 2024-08-04 | End: 2024-08-06 | Stop reason: HOSPADM

## 2024-08-04 RX ORDER — FAMOTIDINE 20 MG/1
20 TABLET, FILM COATED ORAL DAILY
Status: DISCONTINUED | OUTPATIENT
Start: 2024-08-04 | End: 2024-08-06 | Stop reason: HOSPADM

## 2024-08-04 RX ORDER — ONDANSETRON 2 MG/ML
4 INJECTION INTRAMUSCULAR; INTRAVENOUS EVERY 30 MIN PRN
Status: DISCONTINUED | OUTPATIENT
Start: 2024-08-04 | End: 2024-08-04

## 2024-08-04 RX ORDER — ACETAMINOPHEN 325 MG/1
650 TABLET ORAL EVERY 4 HOURS PRN
Status: DISCONTINUED | OUTPATIENT
Start: 2024-08-04 | End: 2024-08-04

## 2024-08-04 RX ORDER — AMOXICILLIN 250 MG
2 CAPSULE ORAL 2 TIMES DAILY PRN
Status: DISCONTINUED | OUTPATIENT
Start: 2024-08-04 | End: 2024-08-06 | Stop reason: HOSPADM

## 2024-08-04 RX ORDER — HYDROMORPHONE HCL IN WATER/PF 6 MG/30 ML
0.4 PATIENT CONTROLLED ANALGESIA SYRINGE INTRAVENOUS
Status: ACTIVE | OUTPATIENT
Start: 2024-08-04 | End: 2024-08-04

## 2024-08-04 RX ORDER — MAGNESIUM 200 MG
1 TABLET ORAL DAILY
COMMUNITY

## 2024-08-04 RX ORDER — MORPHINE SULFATE 4 MG/ML
4 INJECTION, SOLUTION INTRAMUSCULAR; INTRAVENOUS
Status: DISCONTINUED | OUTPATIENT
Start: 2024-08-04 | End: 2024-08-04

## 2024-08-04 RX ADMIN — ONDANSETRON 4 MG: 2 INJECTION INTRAMUSCULAR; INTRAVENOUS at 07:43

## 2024-08-04 RX ADMIN — TOLTERODINE TARTRATE 2 MG: 2 CAPSULE, EXTENDED RELEASE ORAL at 21:32

## 2024-08-04 RX ADMIN — FAMOTIDINE 20 MG: 20 TABLET, FILM COATED ORAL at 14:44

## 2024-08-04 RX ADMIN — SODIUM CHLORIDE, POTASSIUM CHLORIDE, SODIUM LACTATE AND CALCIUM CHLORIDE: 600; 310; 30; 20 INJECTION, SOLUTION INTRAVENOUS at 15:17

## 2024-08-04 RX ADMIN — SODIUM CHLORIDE 1000 ML: 9 INJECTION, SOLUTION INTRAVENOUS at 07:35

## 2024-08-04 RX ADMIN — SODIUM CHLORIDE: 9 INJECTION, SOLUTION INTRAVENOUS at 14:45

## 2024-08-04 RX ADMIN — ACETAMINOPHEN 975 MG: 325 TABLET, FILM COATED ORAL at 14:44

## 2024-08-04 RX ADMIN — MORPHINE SULFATE 4 MG: 4 INJECTION, SOLUTION INTRAMUSCULAR; INTRAVENOUS at 07:42

## 2024-08-04 RX ADMIN — MYCOPHENOLATE MOFETIL 500 MG: 250 CAPSULE ORAL at 19:43

## 2024-08-04 ASSESSMENT — ACTIVITIES OF DAILY LIVING (ADL)
ADLS_ACUITY_SCORE: 38
ADLS_ACUITY_SCORE: 47
ADLS_ACUITY_SCORE: 38
ADLS_ACUITY_SCORE: 43
ADLS_ACUITY_SCORE: 43
ADLS_ACUITY_SCORE: 36
ADLS_ACUITY_SCORE: 43
ADLS_ACUITY_SCORE: 39
ADLS_ACUITY_SCORE: 38
ADLS_ACUITY_SCORE: 43
ADLS_ACUITY_SCORE: 43
ADLS_ACUITY_SCORE: 38
ADLS_ACUITY_SCORE: 39

## 2024-08-04 NOTE — ED NOTES
Pt states that she normally takes her anti-rejection meds Mycophenolate and Prednisone at 0830. RN notified Dr. Lemon. MD aware, hold at this time.

## 2024-08-04 NOTE — ED TRIAGE NOTES
Abdominal pain right side increasing pain and unable to sleep. Pain 10/10. Pt. Denies dysuria and has regular BMs. Denies fever. Abdomen is distended.  drove pt here.     Triage Assessment (Adult)       Row Name 08/04/24 0632          Triage Assessment    Airway WDL WDL        Respiratory WDL    Respiratory WDL WDL        Skin Circulation/Temperature WDL    Skin Circulation/Temperature WDL WDL        Cardiac WDL    Cardiac WDL WDL        Peripheral/Neurovascular WDL    Peripheral Neurovascular WDL WDL        Cognitive/Neuro/Behavioral WDL    Cognitive/Neuro/Behavioral WDL WDL

## 2024-08-04 NOTE — PROGRESS NOTES
"PRIMARY Concern: RUQ abdominal/flank pain for 2 days.   SAFETY RISK Concerns (fall risk, behaviors, etc.): Fall      Isolation/Type: None  Tests/Procedures for NEXT shift: HIDA scan  Consults? (Pending/following, signed-off?): Gen Surgery, pharmacy consulted.   Where is patient from? (Home, TCU, etc.):Home. Lives w/ Spouse  Other Important info for NEXT shift: Hx hernia, polycystic kidney(Kidney transplant) and small bowel obstruction   Anticipated DC date & active delays: TBD, pending HIDA scan results and Gen Surg rec.   ____________________________________  SUMMARY NOTE:  Orientation/Cognitive: A/OX4. Forgetful at times  Observation Goals (Met/ Not Met): Not met, HIDA scan yet to be done.   Mobility Level/Assist Equipment: A1/GB/W  Antibiotics & Plan (IV/po, length of tx left): None  Pain Management: PRN Tylenol tolerating well. PRN IV Dilaudid available.   Tele/VS/O2: Slightly elevated BP, Tachy  ABNL Lab/BG: Na 128, Hgb 11.6, UC pending.   Diet: Clears.   Bowel/Bladder: Cont. BM today  Skin Concerns: Scattered bruises.   Drains/Devices: PIV infusing IVF as ordered.   Patient Stated Goal for Today:Pain management.   Other: \"stop all narcotics at midnight in anticipation of HIDA scan in AM\". Plan for EGD if HIDA scan is negative per Gen Surg.          Observation goals  PRIOR TO DISCHARGE       Comments:   -diagnostic tests and consults completed and resulted: not met, HIDA scan tomorrow    -vital signs normal or at patient baseline: Partially met.Slightly elevated BP, tachy    -tolerating oral intake to maintain hydration: Met    -adequate pain control on oral analgesics: Met    -tolerating oral antibiotics or has plans for home infusion setup: NA    -returns to baseline functional status: Not met    -safe disposition plan has been identified; Not met    Nurse to notify provider when observation goals have been met and patient is ready for discharge.          "

## 2024-08-04 NOTE — ED PROVIDER NOTES
Emergency Department Note      History of Present Illness   Chief Complaint   Abdominal Pain    HPI   Maria E Webster is a 83 year old female with a history of diverticulitis, hypertension, hernia, polycystic kidney and small bowel obstruction who presents with her  for an evaluation of abdominal pain. The patient stated having right abdominal pain that began yesterday and worsened over night. She added being unable to sleep. She denies nausea, vomiting, diarrhea, constipation or fever. She also denies taking pain medications. Her  noted she has had bladder surgery recently and a kidney transplant about 15 years ago. He also noted she has her gallbladder.     Independent Historian    as detailed above.    Review of External Notes   I reviewed hospitalization from June 21, 2024.  Patient was admitted with a small bowel obstruction.  Has a history of pollock is cystic kidney disease status post renal transplant and takes prednisone and mycophenolate.  Small bowel obstruction resolved and was felt secondary to obstipation.  Patient was able to be discharged home on June 23.    Past Medical History   Medical History and Problem List   Arthritis   Diverticulitis   Hypertension   Hernia   Polycystic kidney   Rectocele   Vaginal vault prolapse   UTI  Hematuria   Small bowel obstruction   GERD  Anemia   Leukopenia   Hyperlipidemia   Myalgia   Hypocalcemia   Depression   Tachycardia     Medications   Omeprazole   Citalopram   Estradiol   Famotidine   Mycophenolate  Prednisone   Simvastatin   Solifenacin   Cyclobenzaprine     Surgical History   Colporrhaphy   Cystoscopy   Correct bunion   Hemorrhoidectomy   Hernia repair   Tympanoplasty   Hysterectomy   Colonoscopy   Kidney transplant   Eye surgery   Ganglion cyst removal of wrist     Physical Exam   Patient Vitals for the past 24 hrs:   BP Temp Temp src Pulse Resp SpO2   08/04/24 1300 (!) 156/75 -- -- 105 18 92 %   08/04/24 1230 125/60 -- -- 101 -- --    08/04/24 1200 119/61 -- -- 102 18 92 %   08/04/24 1130 113/52 -- -- 101 -- --   08/04/24 1100 137/71 -- -- 98 -- 90 %   08/04/24 0900 136/63 -- -- 95 18 92 %   08/04/24 0830 139/67 -- -- 101 16 93 %   08/04/24 0800 (!) 141/70 -- -- 98 16 --   08/04/24 0745 (!) 141/65 -- -- 96 16 96 %   08/04/24 0633 128/50 98  F (36.7  C) Oral 100 16 96 %     Physical Exam  General: Well-nourished, appears to be in pain  Eyes: PERRL, conjunctivae pink no scleral icterus or conjunctival injection  ENT:  Moist mucus membranes, posterior oropharynx clear without erythema or exudates  Respiratory:  Lungs clear to auscultation bilaterally, no crackles/rubs/wheezes.  Good air movement  CV: Normal rate and rhythm, no murmurs/rubs/gallops  GI:  Abdomen soft and non-distended.  Normoactive BS.  +RUQ tenderness, no guarding or rebound  Skin: Warm, dry.  No rashes or petechiae  Musculoskeletal: No peripheral edema or calf tenderness  Neuro: Alert and oriented to person/place/time  Psychiatric: Normal affect    Diagnostics   Lab Results   Labs Ordered and Resulted from Time of ED Arrival to Time of ED Departure   COMPREHENSIVE METABOLIC PANEL - Abnormal       Result Value    Sodium 128 (*)     Potassium 4.0      Carbon Dioxide (CO2) 26      Anion Gap 8      Urea Nitrogen 22.1      Creatinine 0.55      GFR Estimate 90      Calcium 9.1      Chloride 94 (*)     Glucose 123 (*)     Alkaline Phosphatase 124      AST 13      ALT 18      Protein Total 6.6      Albumin 3.5      Bilirubin Total 0.6     CBC WITH PLATELETS AND DIFFERENTIAL - Abnormal    WBC Count 6.5      RBC Count 3.70 (*)     Hemoglobin 11.6 (*)     Hematocrit 34.4 (*)     MCV 93      MCH 31.4      MCHC 33.7      RDW 13.7      Platelet Count 248      % Neutrophils 86      % Lymphocytes 4      % Monocytes 9      % Eosinophils 0      % Basophils 0      % Immature Granulocytes 1      NRBCs per 100 WBC 0      Absolute Neutrophils 5.6      Absolute Lymphocytes 0.2 (*)     Absolute  Monocytes 0.6      Absolute Eosinophils 0.0      Absolute Basophils 0.0      Absolute Immature Granulocytes 0.1      Absolute NRBCs 0.0     ISTAT GASES LACTATE VENOUS POCT - Abnormal    Lactic Acid POCT 0.6      Bicarbonate Venous POCT 23      O2 Sat, Venous POCT 64 (*)     pCO2 Venous POCT 34 (*)     pH Venous POCT 7.43      pO2 Venous POCT 32      Base Excess/Deficit (+/-) POCT -1.0     UA MACROSCOPIC WITH REFLEX TO MICRO AND CULTURE - Abnormal    Color Urine Yellow      Appearance Urine Slightly Cloudy (*)     Glucose Urine Negative      Bilirubin Urine Negative      Ketones Urine 40 (*)     Specific Gravity Urine 1.030      Blood Urine Negative      pH Urine 6.5      Protein Albumin Urine 100 (*)     Urobilinogen Urine 8.0 (*)     Nitrite Urine Negative      Leukocyte Esterase Urine Small (*)     Bacteria Urine Many (*)     Mucus Urine Present (*)     RBC Urine 2      WBC Urine 11 (*)     Squamous Epithelials Urine 5 (*)    LIPASE - Normal    Lipase 18     FRACTIONAL EXCRETION OF SODIUM    Creatinine Urine mg/dL 157.9      Sodium Urine mmol/L 21      %FENA 0.1     URINE CULTURE   FRACTIONAL EXCRETION OF SODIUM     Imaging   US Abdomen Limited   Final Result   IMPRESSION:   1.  Cholelithiasis with mild gallbladder wall thickening. Findings are nonspecific given negative sonographic Vila's sign and absence of pericholecystic fluid. Differential considerations include chronic cholecystitis or developing acute cholecystitis.    HIDA could further evaluate as clinically indicated.   2.  The common bile duct is dilated measuring up to 13 mm in diameter. No intraductal calculus is identified by sonography. Recommend correlation with clinical and laboratory values.   3.  Redemonstrated sequela of autosomal dominant polycystic kidney disease.            CT Abdomen Pelvis without Contrast (stone protocol)   Final Result   IMPRESSION:    1.  No bowel obstruction or other acute abnormality in the abdomen or pelvis on  noncontrast CT.   2.  Sequela of autosomal dominant polycystic kidney disease with left lower quadrant transplant kidney. No hydronephrosis.   3.  Trace right effusion and trace pelvic ascites, which may be related to volume status.   4.  Ill-defined 3.2 cm soft tissue near the right aspect of the vaginal cuff of unclear etiology. Nonemergent pelvic ultrasound for further evaluation is recommended.         NM Hepatobiliary Scan with GB EF and/or Pharm    (Results Pending)     EKG   ECG taken at 0659, ECG read at 0708  Normal sinus rhythm  Septal infarct, age undetermined    Rate 92 bpm. LA interval 130 ms. QRS duration 74 ms. QT/QTc 320/395 ms. P-R-T axes 46 63 18.    Independent Interpretation   None    ED Course    Medications Administered   Medications   ondansetron (ZOFRAN) injection 4 mg (4 mg Intravenous $Given 8/4/24 0743)   morphine (PF) injection 4 mg (4 mg Intravenous $Given 8/4/24 0742)   citalopram (celeXA) tablet 10 mg (has no administration in time range)   famotidine (PEPCID) tablet 20 mg (has no administration in time range)   mycophenolate (GENERIC EQUIVALENT) capsule 500 mg (has no administration in time range)   predniSONE (DELTASONE) tablet 5 mg (has no administration in time range)   tolterodine ER (DETROL LA) 24 hr capsule 2 mg (has no administration in time range)   sodium chloride 0.9 % infusion (has no administration in time range)   sodium chloride 0.9% BOLUS 1,000 mL (0 mLs Intravenous Stopped 8/4/24 0835)     Procedures   None      Discussion of Management   Admitting Hospitalist, Dr. Goddard    ED Course   ED Course as of 08/04/24 1352   Sun Aug 04, 2024   0647 I obtained history and examined the patient as noted above.    0911 I rechecked and updated the patient.    1139 I consulted with hospitalist; Dr. Goddard.    1153 I consulted with Dr. Heredia with general surgery. He recommended admission and HIDA scan.    1155 I consulted with Dr. Goddard with the hospiatlist service.   1201 I  rechecked and updated the patient. The patient is aware she is going to be admitted.      Additional Documentation  None  Medical Decision Making / Diagnosis   CMS Diagnoses: None    MIPS       None    MDM   Maria E Webster is a 83 year old female who comes with worsening right upper quadrant pain from the previous day.  She was tender in the right upper quadrant.  However, given her concern for abdominal distention and her admission with small bowel obstruction, as well as her surgical history, I did obtain a CT scan to rule out obstruction or perforated viscus.  This was negative.  Right upper quadrant ultrasound was obtained and is concerning for possible developing cholecystitis.  She is not having a fever or other signs of infection.  She was treated with fluids and pain medications.  Given her age and comorbidities, will admit to the hospital for HIDA scan and surgical consultation.  Will hold off on antibiotics at this time.  Discussed with general surgery as well as Dr. Liu of the hospital service who graciously agreed to admit the patient.  Patient and her  were in agreement the plan as well.    Disposition   The patient was admitted to the hospital.     Diagnosis     ICD-10-CM    1. Biliary colic  K80.50       2. Right upper quadrant abdominal pain  R10.11            Discharge Medications   New Prescriptions    No medications on file     Scribe Disclosure:  IAngela, am serving as a scribe at 7:18 AM on 8/4/2024 to document services personally performed by Rianna Lemon MD based on my observations and the provider's statements to me.      Rianna Lemon MD  08/04/24 [id number]

## 2024-08-04 NOTE — ED TRIAGE NOTES
Rates pain 10/10, gets better when sitting. Pt. States she was unable to sleep last night due to the pain. States BM/voiding as usual. Abdomen is distended.      Triage Assessment (Adult)       Row Name 08/04/24 0632          Triage Assessment    Airway WDL WDL        Respiratory WDL    Respiratory WDL WDL        Skin Circulation/Temperature WDL    Skin Circulation/Temperature WDL WDL        Cardiac WDL    Cardiac WDL WDL        Peripheral/Neurovascular WDL    Peripheral Neurovascular WDL WDL        Cognitive/Neuro/Behavioral WDL    Cognitive/Neuro/Behavioral WDL WDL

## 2024-08-04 NOTE — H&P
[facility]    History and Physical - Hospitalist Service       Date of Admission:  8/4/2024    Assessment & Plan   Maria E Webster is a 83 year old female with a history of diverticulitis, hypertension, hernia, polycystic kidney and small bowel obstruction who presents with her  for an evaluation of abdominal pain. The patient stated having right abdominal pain that began yesterday and worsened over night. She added being unable to sleep. She denies nausea, vomiting, diarrhea, constipation or fever. She also denies taking pain medications. Her  noted she has had bladder surgery recently and a kidney transplant about 15 years ago. He also noted she has her gallbladder.     Patient presented afebrile, normal wbc, BMP is normal except Na of 128, LFT's normal, LA is normal, Lipase is normal. CT scan of abd/pelvis WO contrast shows mildly distended GB, no obstruction, prior kidney txp LLQ and sequalae of  adult polycystic kidney disease.  RUQ US showed gallstone with mild GB wall thickening, negative sonographic Vila's sign, CBD 13mm, no intraductal stones identified.    Case was discussed with general surgery on call and plan to admit to observation with planned HIDA scan tomorrow.    ## Abdominal pain  ## Mild GB wall thickening with cholelithiasis  Patient presents with 24 hours of RUQ pain without any fevers, chills or sweats with prior hx of SBO, kidney transplantation.  Admit to observation status  Clear liquid diet  LR @ 75 ml/hr  Tylenol for mild pain and IV dilaudid for mod-severe pain (stop all narcotics at midnight in anticipation of HIDA scan in AM)  HIDA scan  General surgery consultation    ## Hyponatremia  Patient denies any nausea or vomiting and she is not on any diuretic.  This maybe be siadh  Check UA  FENA  NS IVF  Follow BMP    ## Adult polycystic kidney disease  ## Hx of kidney transplant  Continue mycophenolate  Continue Prednisone  Add stress dose if  patient has fever or has any clinical deterioration along with antibiotcs    ## Depression  Continue Celexa    ## GERD  Continue Pepcid    ## Hyperlipidemia  Hold statin    ## Overactive bladder   Continue vesicare or its anticholinergic equivalent    Diet: Clear liquid diet, NPO at midnight  DVT Prophylaxis: Pneumatic Compression Devices  Jenkins Catheter: Not present  Lines: None     Cardiac Monitoring: None  Code Status:  FULL    Clinically Significant Risk Factors Present on Admission         # Hyponatremia: Lowest Na = 128 mmol/L in last 2 days, will monitor as appropriate          # Hypertension: Noted on problem list                    Disposition Plan     Medically Ready for Discharge: Anticipated in 2-4 Days           Tray Goddard MD  Hospitalist Service  [facility]  Securely message with [employer] (more info)  Text page via [facility] Paging/Directory     ______________________________________________________________________    Chief Complaint   Abdominal pain    History is obtained from the patient, electronic health record, and emergency department physician    History of Present Illness   Maria E Webster is a 83 year old female with a history of diverticulitis, hypertension, hernia, polycystic kidney and small bowel obstruction who presents with her  for an evaluation of abdominal pain. The patient stated having right abdominal pain that began yesterday and worsened over night. She added being unable to sleep. She denies nausea, vomiting, diarrhea, constipation or fever. She also denies taking pain medications. Her  noted she has had bladder surgery recently and a kidney transplant about 15 years ago. He also noted she has her gallbladder.     Patient presented afebrile, normal wbc, BMP is normal except Na of 128, LFT's normal, LA is normal, Lipase is normal. CT scan of abd/pelvis WO contrast shows mildly distended GB, no obstruction, prior kidney txp LLQ and sequalae  of  adult polycystic kidney disease.  RUQ US showed gallstone with mild GB wall thickening, negative sonographic Vila's sign, CBD 13mm, no intraductal stones identified.    Case was discussed with general surgery on call and plan to admit to observation with planned HIDA scan tomorrow.    Past Medical History    Past Medical History:   Diagnosis Date    Arthritis     Diverticulitis of small intestine with perforation 01/10/2022    Diverticulosis of colon (without mention of hemorrhage)     Essential hypertension, benign 07/15/2003    Hernia, abdominal     Polycystic kidney, unspecified type     Kidney transplant    PONV (postoperative nausea and vomiting)     Rectocele     S/P hysterectomy 03/23/2023    Senile osteoporosis 05/04/2023    Status post kidney transplant 07/19/2017    Unspecified essential hypertension     Vaginal vault prolapse 07/19/2017       Past Surgical History   Past Surgical History:   Procedure Laterality Date    COLPORRHAPHY ANTERIOR, POSTERIOR, COMBINED N/A 3/23/2023    Procedure: Complete Colpectomy, Anterior/Posterior Repair, Enterocele Repair, Levator Pelvic Floor Muscle Plication, Perineorrhaphy, Excision of vulvar cysts X 4;  Surgeon: Guerita Aragon MD;  Location: [facility] OR    CYSTOSCOPY      CYSTOSCOPY, SLING TRANSVAGINAL N/A 3/23/2023    Procedure: Retropubic Midurethral Sling and Cystoscopy;  Surgeon: Guerita Aragon MD;  Location: [facility] OR    HC CORRECT BUNION,SIMPLE      HEMORRHOIDECTOMY      HERNIA REPAIR      TYMPANOPLASTY  05/09/2012    Procedure:TYMPANOPLASTY; Left Tympanoplasty ; Surgeon:BROOKE SANCHEZ; Location: OR    [facility] TRANSPLANTATION OF KIDNEY  2009    left    [facility] VAGINAL HYSTERECTOMY  1996    prolapse uterus, bilat. S&O    [facility] COLONOSCOPY THRU STOMA, DIAGNOSTIC  05/2003    few diverticulae       Prior to Admission Medications   Prior to Admission Medications   Prescriptions Last Dose Informant Patient Reported? Taking?   acetaminophen (TYLENOL) 325 MG tablet   at PRN  Yes Yes   Sig: Take 975 mg by mouth 2 times daily as needed for mild pain   calcium carbonate (TUMS) 500 MG chewable tablet 8/3/2024  Yes Yes   Sig: Take 2 chew tab by mouth 2 times daily Lunch & dinner   citalopram (CELEXA) 10 MG tablet 8/3/2024  Yes Yes   Sig: Take 10 mg by mouth daily   cyanocobalamin 1000 MCG sublingual tablet 8/3/2024  Yes Yes   Sig: Place 1 tablet under the tongue daily   estradiol (ESTRACE) 0.1 MG/GM vaginal cream 8/3/2024  Yes Yes   Sig: Place 1 g vaginally daily   famotidine (PEPCID) 20 MG tablet 8/3/2024  Yes Yes   Sig: Take 20 mg by mouth daily   mycophenolate (GENERIC EQUIVALENT) 250 MG capsule 8/3/2024 at 2030  Yes Yes   Sig: Take 500 mg by mouth 2 times daily At 0830 and 2030   polyethylene glycol (MIRALAX) 17 GM/Dose powder  at PRN  Yes Yes   Sig: Take 17 g by mouth daily as needed   predniSONE (DELTASONE) 5 MG tablet 8/3/2024 at 0830 Self Yes Yes   Sig: Take 5 mg by mouth daily At 0830 with mycophenolate   simvastatin (ZOCOR) 5 MG tablet 8/3/2024 at pm  Yes Yes   Sig: Take 1 tablet (5 mg) by mouth at bedtime   solifenacin (VESICARE) 5 MG tablet 8/3/2024 at pm  Yes Yes   Sig: Take 5 mg by mouth daily   vitamin D3 (CHOLECALCIFEROL) 50 mcg (2000 units) tablet 8/3/2024  Yes Yes   Sig: Take 1 tablet by mouth daily      Facility-Administered Medications: None           Physical Exam   Vital Signs: Temp: 98  F (36.7  C) Temp src: Oral BP: 136/63 Pulse: 95   Resp: 18 SpO2: 92 % (error correction) O2 Device: None (Room air)    Weight: 0 lbs 0 oz    General Appearance: NAD, confused  Respiratory: CTA  CV: RRR  GI: Soft NT  Skin: Warm  Other: Moves all 4 ext     Medical Decision Making       60 MINUTES SPENT BY ME on the date of service doing chart review, history, exam, documentation & further activities per the note.      Data     I have personally reviewed the following data over the past 24 hrs:    6.5  \   11.6 (L)   / 248     128 (L) 94 (L) 22.1 /  123 (H)   4.0 26 0.55 \     ALT:  18 AST: 13 AP: 124 TBILI: 0.6   ALB: 3.5 TOT PROTEIN: 6.6 LIPASE: 18     Procal: N/A CRP: N/A Lactic Acid: 0.6         Imaging results reviewed over the past 24 hrs:   Recent Results (from the past 24 hour(s))   CT Abdomen Pelvis without Contrast (stone protocol)    Narrative    EXAM: CT ABDOMEN PELVIS W/O CONTRAST  LOCATION: [facility]  DATE: 8/4/2024    INDICATION: abdominal pain and distention  COMPARISON: CT abdomen pelvis 6/21/2024  TECHNIQUE: CT scan of the abdomen and pelvis was performed without IV contrast. Multiplanar reformats were obtained. Dose reduction techniques were used.  CONTRAST: None.    FINDINGS:   LOWER CHEST: Right middle and lower lobe atelectasis. Trace right effusion.    HEPATOBILIARY: Unchanged diffuse hepatic cysts. Mildly distended gallbladder.    PANCREAS: Unremarkable.    SPLEEN: Unremarkable.    ADRENAL GLANDS: Normal.    KIDNEYS/BLADDER: Similar sequela of autosomal dominant polycystic kidney disease of the bilateral native kidneys. No hydronephrosis.    Unchanged contours of the left lower quadrant transplant kidney. No hydronephrosis. Urinary bladder is unremarkable.    BOWEL: No bowel obstruction. Small sliding-type hiatal hernia. Intramural lipoma in the duodenum again noted. Trace pelvic ascites.    LYMPH NODES: No lymphadenopathy.    VASCULATURE: The abdominal aorta is nonaneurysmal with severe circumferential atherosclerotic calcifications.    PELVIC ORGANS: 2.9 x 3.2 cm soft tissue in the pelvis near the vaginal cuff (4/208).    MUSCULOSKELETAL: Unchanged mild L3 inferior endplate deformity. Healed right pubic and right rib fractures. No worrisome bone lesions.      Impression    IMPRESSION:   1.  No bowel obstruction or other acute abnormality in the abdomen or pelvis on noncontrast CT.  2.  Sequela of autosomal dominant polycystic kidney disease with left lower quadrant transplant kidney. No hydronephrosis.  3.  Trace right effusion and trace  pelvic ascites, which may be related to volume status.  4.  Ill-defined 3.2 cm soft tissue near the right aspect of the vaginal cuff of unclear etiology. Nonemergent pelvic ultrasound for further evaluation is recommended.     US Abdomen Limited    Narrative    EXAM: US ABDOMEN LIMITED  LOCATION: [facility]  DATE: 8/4/2024    INDICATION: RUq pain  COMPARISON: CT abdomen pelvis 08/04/2024.  TECHNIQUE: Limited abdominal ultrasound.    FINDINGS:    GALLBLADDER: Multiple small gallstones are present. Mild gallbladder wall thickening measuring up to 4 mm in thickness. No pericholecystic fluid. Negative sonographic Vila's sign.    BILE DUCTS: The common bile duct is dilated measuring up to 13 mm in diameter. No intraductal calculus is identified by sonography.    LIVER: Redemonstrated innumerable hepatic cysts of varying size and complexity.    RIGHT KIDNEY: Redemonstrated sequela of autosomal dominant polycystic kidney disease with innumerable cysts of varying size and complexity and parenchymal calcifications.    PANCREAS: The visualized portions are normal.    No ascites.      Impression    IMPRESSION:  1.  Cholelithiasis with mild gallbladder wall thickening. Findings are nonspecific given negative sonographic Vila's sign and absence of pericholecystic fluid. Differential considerations include chronic cholecystitis or developing acute cholecystitis.   HIDA could further evaluate as clinically indicated.  2.  The common bile duct is dilated measuring up to 13 mm in diameter. No intraductal calculus is identified by sonography. Recommend correlation with clinical and laboratory values.  3.  Redemonstrated sequela of autosomal dominant polycystic kidney disease.

## 2024-08-04 NOTE — CONSULTS
"[facility] General Surgery Consultation    Maria E Webster MRN# [id number]   YOB: 1940 Age: 83 year old      Date of Admission:  8/4/2024  Date of Consult: 8/4/2024         Assessment and Plan:   Patient is a 83 year old female with a complex surgical history (Kidney transplant)  who presents with RUQ abdominal/flank pain.  It has been going on for 2 days.  CT negative for acute findings.  Ultrasound suggests a slightly thickened gallbladder wall.     PLAN:  1) I will order a HIDA scan to further work up her gallbladder.   At this point it is not clear that it is the cause of her issues.    2) If the HIDA is negative would continue further work up such as an EGD.    Time spent on reviewing records, imaging, personally meeting and examining the patient, and communicating with other consultants and primary team 60 minutes.               Requesting Physician:      Dr. Lemon/Nitza        Chief Complaint:     Chief Complaint   Patient presents with    Abdominal Pain          History of Present Illness:   Maria E Webster is a 83 year old female who was seen in consultation at the request of Dr. Lemon who presented with a 2 day history of right upper quadrant abdominal pain.  She states it is not related to food.  It tends to get worse at night.  She denies nausea, vomiting and has been having normal bowel movements.             Physical Exam:   Blood pressure (!) 149/90, pulse 108, temperature 98.8  F (37.1  C), temperature source Oral, resp. rate 18, height 1.499 m (4' 11\"), weight 49 kg (108 lb), SpO2 98%, not currently breastfeeding.  108 lbs 0 oz  General: NAD  Psych: Alert and Oriented.  Normal affect  Neurological: grossly intact  Eyes: Sclera clear  Respiratory:  nonlabored breathing  Cardiovascular:  Regular Rate and Rhythm   GI: Patient shows a point on her right flank as the site with the most pain, Tender in the RUQ to palpation, no Vila's sign.   Lymphatic/Hematologic/Immune:  No " femoral or cervical lymphadenopathy.  Integumentary:  No rashes         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            Past Surgical History:     Past Surgical History:   Procedure Laterality Date    COLPORRHAPHY ANTERIOR, POSTERIOR, COMBINED N/A 3/23/2023    Procedure: Complete Colpectomy, Anterior/Posterior Repair, Enterocele Repair, Levator Pelvic Floor Muscle Plication, Perineorrhaphy, Excision of vulvar cysts X 4;  Surgeon: Guerita Aragon MD;  Location: [facility] OR    CYSTOSCOPY      CYSTOSCOPY, SLING TRANSVAGINAL N/A 3/23/2023    Procedure: Retropubic Midurethral Sling and Cystoscopy;  Surgeon: Guerita Aragon MD;  Location: [facility] OR     CORRECT BUNION,SIMPLE      HEMORRHOIDECTOMY      HERNIA REPAIR      TYMPANOPLASTY  05/09/2012    Procedure:TYMPANOPLASTY; Left Tympanoplasty ; Surgeon:BROOKE SANCHEZ; Location: OR    [facility] TRANSPLANTATION OF KIDNEY  2009    left    [facility] VAGINAL HYSTERECTOMY  1996    prolapse uterus, bilat. S&O    [facility] COLONOSCOPY THRU STOMA, DIAGNOSTIC  05/2003    few diverticulae            Current Medications:         Current Facility-Administered Medications   Medication Dose Route Frequency Provider Last Rate Last Admin    citalopram (celeXA) tablet 10 mg  10 mg Oral Daily Tray Goddard MD        famotidine (PEPCID) tablet 20 mg  20 mg Oral Daily Tray Goddard MD   20 mg at 08/04/24 1444    mycophenolate (GENERIC EQUIVALENT) capsule 500 mg   500 mg Oral BID Tray Goddard MD        predniSONE (DELTASONE) tablet 5 mg  5 mg Oral Daily Tray Goddard MD        tolterodine ER (DETROL LA) 24 hr capsule 2 mg  2 mg Oral At Bedtime Tray Goddard MD           Current Facility-Administered Medications   Medication Dose Route Frequency Provider Last Rate Last Admin    acetaminophen (TYLENOL) Suppository 650 mg  650 mg Rectal Q4H PRN Tray Goddard MD        acetaminophen (TYLENOL) tablet 975 mg  975 mg Oral BID PRN Tray Goddard MD   975 mg at 08/04/24 1444    HYDROmorphone (DILAUDID) injection 0.2 mg  0.2 mg Intravenous Q2H PRN Tray Goddard MD        HYDROmorphone (DILAUDID) injection 0.4 mg  0.4 mg Intravenous Q2H PRN Tray Goddard MD        naloxone (NARCAN) injection 0.2 mg  0.2 mg Intravenous Q2 Min PRN Tray Goddard MD        Or    naloxone (NARCAN) injection 0.4 mg  0.4 mg Intravenous Q2 Min PRN Tray Goddard MD        Or    naloxone (NARCAN) injection 0.2 mg  0.2 mg Intramuscular Q2 Min PRN Tray Goddard MD        Or    naloxone (NARCAN) injection 0.4 mg  0.4 mg Intramuscular Q2 Min PRN Tray Goddard MD        ondansetron (ZOFRAN ODT) ODT tab 4 mg  4 mg Oral Q6H PRN Tray Goddard MD        Or    ondansetron (ZOFRAN) injection 4 mg  4 mg Intravenous Q6H PRN Tray Goddard MD        senna-docusate (SENOKOT-S/PERICOLACE) 8.6-50 MG per tablet 1 tablet  1 tablet Oral BID PRN Tray Goddard MD        Or    senna-docusate (SENOKOT-S/PERICOLACE) 8.6-50 MG per tablet 2 tablet  2 tablet Oral BID PRN Tray Goddard MD                Home Medications:     Prior to Admission medications    Medication Sig Last Dose Taking? Auth Provider Long Term End Date   acetaminophen (TYLENOL) 325 MG tablet Take 975 mg by mouth 2 times daily as needed for mild pain  at PRN Yes Reported, Patient     calcium carbonate (TUMS) 500 MG chewable tablet Take 2 chew tab by mouth 2 times daily Lunch & dinner  8/3/2024 Yes Reported, Patient     citalopram (CELEXA) 10 MG tablet Take 10 mg by mouth daily 8/3/2024 Yes Unknown, Entered By History Yes    cyanocobalamin 1000 MCG sublingual tablet Place 1 tablet under the tongue daily 8/3/2024 Yes Unknown, Entered By History     estradiol (ESTRACE) 0.1 MG/GM vaginal cream Place 1 g vaginally daily 8/3/2024 Yes Reported, Patient     famotidine (PEPCID) 20 MG tablet Take 20 mg by mouth daily 8/3/2024 Yes Unknown, Entered By History     mycophenolate (GENERIC EQUIVALENT) 250 MG capsule Take 500 mg by mouth 2 times daily At 0830 and 2030 8/3/2024 at 2030 Yes Unknown, Entered By History     polyethylene glycol (MIRALAX) 17 GM/Dose powder Take 17 g by mouth daily as needed  at PRN Yes Reported, Patient     predniSONE (DELTASONE) 5 MG tablet Take 5 mg by mouth daily At 0830 with mycophenolate 8/3/2024 at 0830 Yes Unknown, Entered By History     simvastatin (ZOCOR) 5 MG tablet Take 1 tablet (5 mg) by mouth at bedtime 8/3/2024 at pm Yes Esha Alomnte MD Yes    solifenacin (VESICARE) 5 MG tablet Take 5 mg by mouth daily 8/3/2024 at pm Yes Unknown, Entered By History     vitamin D3 (CHOLECALCIFEROL) 50 mcg (2000 units) tablet Take 1 tablet by mouth daily 8/3/2024 Yes Unknown, Entered By History              Allergies:     Allergies   Allergen Reactions    Codeine Nausea and Vomiting    Fentanyl Nausea and Nausea and Vomiting    Morphine Nausea and Vomiting    Oxycodone-Acetaminophen Nausea and Nausea and Vomiting     Okay with plain oxycodone at low doses                Family History:     Family History   Problem Relation Age of Onset    Genitourinary Problems Father         polycystic kidneys in family    Depression Mother     Hypertension Mother     Depression Sister     Depression Sister     Depression Brother     Hypertension Sister            Social History:   Maria E Webster  reports that she has never smoked. She has never used smokeless tobacco. She reports current alcohol use.  She reports that she does not use drugs.          Review of Systems:   The 10 point Review of Systems is negative other than noted in the HPI.         Labs/Imaging   All new lab and imaging data was reviewed.   I have personally reviewed the imaging studies CT scan and US.  Ct shows polycystic disease in the liver and kidneys with no other source for her pain.  Ultrasound does show a slightly thickened wall on her gallbladder with cholelithiasis.          Lucio Ervin M.D., F.A.C.S.  [facility]  [facility]  [place], [place]

## 2024-08-04 NOTE — PHARMACY-ADMISSION MEDICATION HISTORY
Pharmacy Intern Admission Medication History    Admission medication history is complete. The information provided in this note is only as accurate as the sources available at the time of the update.    Information Source(s): Patient and CareEverywhere/SureScripts via in-person    Pertinent Information: No SureScripts fill history for prednisone and mycophenolate, pt confirms taking    Changes made to PTA medication list:  Added: vitamin B12  Deleted: no longer taking omeprazole ~2 weeks ago, senna, cyclobenzaprine, sucralfate  Changed: estrace cream 3x weekly --> QD    Allergies reviewed with patient and updates made in EHR: yes    Medication History Completed By: JOSE D TEJADA 8/4/2024 11:21 AM    PTA Med List   Medication Sig Last Dose    acetaminophen (TYLENOL) 325 MG tablet Take 975 mg by mouth 2 times daily as needed for mild pain  at PRN    calcium carbonate (TUMS) 500 MG chewable tablet Take 2 chew tab by mouth 2 times daily Lunch & dinner 8/3/2024    citalopram (CELEXA) 10 MG tablet Take 10 mg by mouth daily 8/3/2024    cyanocobalamin 1000 MCG sublingual tablet Place 1 tablet under the tongue daily 8/3/2024    estradiol (ESTRACE) 0.1 MG/GM vaginal cream Place 1 g vaginally daily 8/3/2024    famotidine (PEPCID) 20 MG tablet Take 20 mg by mouth daily 8/3/2024    mycophenolate (GENERIC EQUIVALENT) 250 MG capsule Take 500 mg by mouth 2 times daily At 0830 and 2030 8/3/2024 at 2030    polyethylene glycol (MIRALAX) 17 GM/Dose powder Take 17 g by mouth daily as needed  at PRN    predniSONE (DELTASONE) 5 MG tablet Take 5 mg by mouth daily At 0830 with mycophenolate 8/3/2024 at 0830    simvastatin (ZOCOR) 5 MG tablet Take 1 tablet (5 mg) by mouth at bedtime 8/3/2024 at pm    solifenacin (VESICARE) 5 MG tablet Take 5 mg by mouth daily 8/3/2024 at pm    vitamin D3 (CHOLECALCIFEROL) 50 mcg (2000 units) tablet Take 1 tablet by mouth daily 8/3/2024       
UNK

## 2024-08-04 NOTE — ED NOTES
[facility]  ED Nurse Handoff Report    ED Chief complaint: Abdominal Pain      ED Diagnosis:   Final diagnoses:   None       Code Status:   Hospitalist to discuss with patient    Allergies:   Allergies   Allergen Reactions    Codeine Nausea and Vomiting    Fentanyl Nausea and Nausea and Vomiting    Morphine Nausea and Vomiting    Oxycodone-Acetaminophen Nausea and Nausea and Vomiting     Okay with plain oxycodone at low doses           Patient Story: Abdominal pain right side increasing pain and unable to sleep. Pain 10/10. Pt. Denies dysuria and has regular BMs. Denies fever. Abdomen is distended.  drove pt here.   Focused Assessment:  Abdominal pain    Treatments and/or interventions provided:   Medications   ondansetron (ZOFRAN) injection 4 mg (4 mg Intravenous $Given 8/4/24 0743)   morphine (PF) injection 4 mg (4 mg Intravenous $Given 8/4/24 0742)   sodium chloride 0.9% BOLUS 1,000 mL (1,000 mLs Intravenous $New Bag 8/4/24 0735)     US Abdomen Limited   Final Result   IMPRESSION:   1.  Cholelithiasis with mild gallbladder wall thickening. Findings are nonspecific given negative sonographic Vila's sign and absence of pericholecystic fluid. Differential considerations include chronic cholecystitis or developing acute cholecystitis.    HIDA could further evaluate as clinically indicated.   2.  The common bile duct is dilated measuring up to 13 mm in diameter. No intraductal calculus is identified by sonography. Recommend correlation with clinical and laboratory values.   3.  Redemonstrated sequela of autosomal dominant polycystic kidney disease.            CT Abdomen Pelvis without Contrast (stone protocol)   Final Result   IMPRESSION:    1.  No bowel obstruction or other acute abnormality in the abdomen or pelvis on noncontrast CT.   2.  Sequela of autosomal dominant polycystic kidney disease with left lower quadrant transplant kidney. No hydronephrosis.   3.  Trace right effusion and  trace pelvic ascites, which may be related to volume status.   4.  Ill-defined 3.2 cm soft tissue near the right aspect of the vaginal cuff of unclear etiology. Nonemergent pelvic ultrasound for further evaluation is recommended.           Patient's response to treatments and/or interventions: Pt reported improved comfort    To be done/followed up on inpatient unit:  per MD orders    Does this patient have any cognitive concerns?:  none    Activity level - Baseline/Home:  Independent  Activity Level - Current:   Stand with Assist    Patient's Preferred language: English   Needed?: No    Isolation: None  Infection: Not Applicable  Patient tested for COVID 19 prior to admission: NO  Bariatric?: No    Vital Signs:   Vitals:    08/04/24 0745 08/04/24 0800 08/04/24 0830 08/04/24 0900   BP: (!) 141/65 (!) 141/70 139/67 136/63   Pulse: 96 98 101 95   Resp: 16 16 16 18   Temp:       TempSrc:       SpO2: 96%  93% 92%       Cardiac Rhythm:Cardiac Rhythm: Normal sinus rhythm    Was the PSS-3 completed:   Yes  What interventions are required if any?               Family Comments: pt's  at bedside    For the majority of the shift this patient's behavior was Green.   Behavioral interventions performed were none.    ED NURSE PHONE NUMBER: *[phone]

## 2024-08-04 NOTE — PROGRESS NOTES
RECEIVING UNIT ED HANDOFF REVIEW    ED Nurse Handoff Report was reviewed by: Reginaldo Maldonado RN on August 4, 2024 at 1:22 PM

## 2024-08-05 ENCOUNTER — APPOINTMENT (OUTPATIENT)
Dept: GENERAL RADIOLOGY | Facility: CLINIC | Age: 84
End: 2024-08-05
Attending: NURSE PRACTITIONER
Payer: MEDICARE

## 2024-08-05 ENCOUNTER — APPOINTMENT (OUTPATIENT)
Dept: NUCLEAR MEDICINE | Facility: CLINIC | Age: 84
End: 2024-08-05
Attending: SURGERY
Payer: MEDICARE

## 2024-08-05 LAB
ALBUMIN SERPL BCG-MCNC: 2.6 G/DL (ref 3.5–5.2)
ALP SERPL-CCNC: 181 U/L (ref 40–150)
ALT SERPL W P-5'-P-CCNC: 19 U/L (ref 0–50)
ANION GAP SERPL CALCULATED.3IONS-SCNC: 12 MMOL/L (ref 7–15)
AST SERPL W P-5'-P-CCNC: 23 U/L (ref 0–45)
BACTERIA UR CULT: NORMAL
BILIRUB SERPL-MCNC: 0.4 MG/DL
BUN SERPL-MCNC: 16.5 MG/DL (ref 8–23)
CALCIUM SERPL-MCNC: 8.2 MG/DL (ref 8.8–10.4)
CHLORIDE SERPL-SCNC: 95 MMOL/L (ref 98–107)
CREAT SERPL-MCNC: 0.42 MG/DL (ref 0.51–0.95)
EGFRCR SERPLBLD CKD-EPI 2021: >90 ML/MIN/1.73M2
ERYTHROCYTE [DISTWIDTH] IN BLOOD BY AUTOMATED COUNT: 13.8 % (ref 10–15)
GLUCOSE SERPL-MCNC: 94 MG/DL (ref 70–99)
HCO3 SERPL-SCNC: 20 MMOL/L (ref 22–29)
HCT VFR BLD AUTO: 33.2 % (ref 35–47)
HGB BLD-MCNC: 10.8 G/DL (ref 11.7–15.7)
MCH RBC QN AUTO: 30.3 PG (ref 26.5–33)
MCHC RBC AUTO-ENTMCNC: 32.5 G/DL (ref 31.5–36.5)
MCV RBC AUTO: 93 FL (ref 78–100)
PLATELET # BLD AUTO: 273 10E3/UL (ref 150–450)
POTASSIUM SERPL-SCNC: 4.5 MMOL/L (ref 3.4–5.3)
PROT SERPL-MCNC: 5.9 G/DL (ref 6.4–8.3)
RBC # BLD AUTO: 3.56 10E6/UL (ref 3.8–5.2)
SODIUM SERPL-SCNC: 127 MMOL/L (ref 135–145)
UPPER GI ENDOSCOPY: NORMAL
WBC # BLD AUTO: 7.3 10E3/UL (ref 4–11)

## 2024-08-05 PROCEDURE — 250N000013 HC RX MED GY IP 250 OP 250 PS 637: Performed by: INTERNAL MEDICINE

## 2024-08-05 PROCEDURE — 88305 TISSUE EXAM BY PATHOLOGIST: CPT | Mod: TC | Performed by: INTERNAL MEDICINE

## 2024-08-05 PROCEDURE — 71101 X-RAY EXAM UNILAT RIBS/CHEST: CPT | Mod: RT

## 2024-08-05 PROCEDURE — 96361 HYDRATE IV INFUSION ADD-ON: CPT | Mod: XU

## 2024-08-05 PROCEDURE — 78227 HEPATOBIL SYST IMAGE W/DRUG: CPT | Mod: MG

## 2024-08-05 PROCEDURE — 80053 COMPREHEN METABOLIC PANEL: CPT

## 2024-08-05 PROCEDURE — 99233 SBSQ HOSP IP/OBS HIGH 50: CPT

## 2024-08-05 PROCEDURE — 96375 TX/PRO/DX INJ NEW DRUG ADDON: CPT

## 2024-08-05 PROCEDURE — 85027 COMPLETE CBC AUTOMATED: CPT

## 2024-08-05 PROCEDURE — 258N000003 HC RX IP 258 OP 636: Performed by: INTERNAL MEDICINE

## 2024-08-05 PROCEDURE — 250N000009 HC RX 250: Performed by: INTERNAL MEDICINE

## 2024-08-05 PROCEDURE — 82977 ASSAY OF GGT: CPT | Performed by: INTERNAL MEDICINE

## 2024-08-05 PROCEDURE — G0378 HOSPITAL OBSERVATION PER HR: HCPCS

## 2024-08-05 PROCEDURE — 250N000012 HC RX MED GY IP 250 OP 636 PS 637: Performed by: INTERNAL MEDICINE

## 2024-08-05 PROCEDURE — 999N000099 HC STATISTIC MODERATE SEDATION < 10 MIN: Performed by: INTERNAL MEDICINE

## 2024-08-05 PROCEDURE — 43239 EGD BIOPSY SINGLE/MULTIPLE: CPT | Performed by: INTERNAL MEDICINE

## 2024-08-05 PROCEDURE — 250N000011 HC RX IP 250 OP 636: Performed by: INTERNAL MEDICINE

## 2024-08-05 PROCEDURE — 36415 COLL VENOUS BLD VENIPUNCTURE: CPT

## 2024-08-05 PROCEDURE — 343N000001 HC RX 343: Performed by: INTERNAL MEDICINE

## 2024-08-05 PROCEDURE — A9537 TC99M MEBROFENIN: HCPCS | Performed by: INTERNAL MEDICINE

## 2024-08-05 RX ORDER — FLUMAZENIL 0.1 MG/ML
0.2 INJECTION, SOLUTION INTRAVENOUS
Status: ACTIVE | OUTPATIENT
Start: 2024-08-05 | End: 2024-08-06

## 2024-08-05 RX ORDER — KIT FOR THE PREPARATION OF TECHNETIUM TC 99M MEBROFENIN 45 MG/10ML
6 INJECTION, POWDER, LYOPHILIZED, FOR SOLUTION INTRAVENOUS ONCE
Status: COMPLETED | OUTPATIENT
Start: 2024-08-05 | End: 2024-08-05

## 2024-08-05 RX ORDER — DIPHENHYDRAMINE HYDROCHLORIDE 50 MG/ML
INJECTION INTRAMUSCULAR; INTRAVENOUS PRN
Status: DISCONTINUED | OUTPATIENT
Start: 2024-08-05 | End: 2024-08-05 | Stop reason: HOSPADM

## 2024-08-05 RX ORDER — LIDOCAINE 40 MG/G
CREAM TOPICAL
Status: DISCONTINUED | OUTPATIENT
Start: 2024-08-05 | End: 2024-08-06 | Stop reason: HOSPADM

## 2024-08-05 RX ORDER — SINCALIDE 5 UG/5ML
0.02 INJECTION, POWDER, LYOPHILIZED, FOR SOLUTION INTRAVENOUS ONCE
Status: COMPLETED | OUTPATIENT
Start: 2024-08-05 | End: 2024-08-05

## 2024-08-05 RX ORDER — SODIUM CHLORIDE 9 MG/ML
INJECTION, SOLUTION INTRAVENOUS CONTINUOUS
Status: DISCONTINUED | OUTPATIENT
Start: 2024-08-05 | End: 2024-08-06

## 2024-08-05 RX ADMIN — CITALOPRAM HYDROBROMIDE 10 MG: 10 TABLET ORAL at 09:06

## 2024-08-05 RX ADMIN — FAMOTIDINE 20 MG: 20 TABLET, FILM COATED ORAL at 09:06

## 2024-08-05 RX ADMIN — MEBROFENIN 7 MILLICURIE: 45 INJECTION, POWDER, LYOPHILIZED, FOR SOLUTION INTRAVENOUS at 07:05

## 2024-08-05 RX ADMIN — ACETAMINOPHEN 975 MG: 325 TABLET, FILM COATED ORAL at 22:11

## 2024-08-05 RX ADMIN — SENNOSIDES AND DOCUSATE SODIUM 1 TABLET: 50; 8.6 TABLET ORAL at 22:11

## 2024-08-05 RX ADMIN — SINCALIDE 1 MCG: 5 INJECTION, POWDER, LYOPHILIZED, FOR SOLUTION INTRAVENOUS at 08:14

## 2024-08-05 RX ADMIN — TOLTERODINE TARTRATE 2 MG: 2 CAPSULE, EXTENDED RELEASE ORAL at 22:11

## 2024-08-05 RX ADMIN — PREDNISONE 5 MG: 5 TABLET ORAL at 09:06

## 2024-08-05 RX ADMIN — SODIUM CHLORIDE, POTASSIUM CHLORIDE, SODIUM LACTATE AND CALCIUM CHLORIDE: 600; 310; 30; 20 INJECTION, SOLUTION INTRAVENOUS at 03:53

## 2024-08-05 RX ADMIN — MYCOPHENOLATE MOFETIL 500 MG: 250 CAPSULE ORAL at 22:11

## 2024-08-05 RX ADMIN — MYCOPHENOLATE MOFETIL 500 MG: 250 CAPSULE ORAL at 09:05

## 2024-08-05 ASSESSMENT — ACTIVITIES OF DAILY LIVING (ADL)
ADLS_ACUITY_SCORE: 39
ADLS_ACUITY_SCORE: 40
ADLS_ACUITY_SCORE: 40
ADLS_ACUITY_SCORE: 44
ADLS_ACUITY_SCORE: 40
ADLS_ACUITY_SCORE: 40
ADLS_ACUITY_SCORE: 44
ADLS_ACUITY_SCORE: 40
ADLS_ACUITY_SCORE: 44
ADLS_ACUITY_SCORE: 40
ADLS_ACUITY_SCORE: 39
ADLS_ACUITY_SCORE: 40
ADLS_ACUITY_SCORE: 40
ADLS_ACUITY_SCORE: 39
ADLS_ACUITY_SCORE: 39
ADLS_ACUITY_SCORE: 44
ADLS_ACUITY_SCORE: 40
ADLS_ACUITY_SCORE: 39
ADLS_ACUITY_SCORE: 40

## 2024-08-05 NOTE — PROGRESS NOTES
[facility]    Medicine Progress Note - Hospitalist Service    Date of Admission:  8/4/2024    Assessment & Plan   Maria E Webster is a 83 year old female with a history of diverticulitis, hypertension, hernia, polycystic kidney and small bowel obstruction who presents with her  for an evaluation of abdominal pain. The patient stated having right abdominal pain that began yesterday and worsened over night. She added being unable to sleep. She denies nausea, vomiting, diarrhea, constipation or fever. She also denies taking pain medications. Her  noted she has had bladder surgery recently and a kidney transplant about 15 years ago. He also noted she has her gallbladder.     Patient presented afebrile, normal wbc, BMP is normal except Na of 128, LFT's normal, LA is normal, Lipase is normal. CT scan of abd/pelvis WO contrast shows mildly distended GB, no obstruction, prior kidney txp LLQ and sequalae of  adult polycystic kidney disease.  RUQ US showed gallstone with mild GB wall thickening, negative sonographic Vila's sign, CBD 13mm, no intraductal stones identified.      Abdominal pain  Mild GB wall thickening with cholelithiasis  Patient presents with 24 hours of RUQ pain without any fevers, chills or sweats with prior hx of SBO, kidney transplantation.  - Admit to observation status  - Clear liquid diet  - NS @ 75 ml/hr  - Tylenol for mild pain and IV dilaudid for mod-severe pain (stop all narcotics at midnight in anticipation of HIDA scan in AM)  - HIDA scan negative for acute cholecystitis   - General surgery has no further recommendations at this time.   - [facility] for consideration of EGD   - EGD shows benign appearing esophageal stenosis, biopsied. No gross lesion in the entire stomach. Normal duodenum. No reason for pain found on exam.   - Ok to resume regular diet    Right pleural effusion  Fracture of the right seventh rib  Patient's  reports patient [place] Rib fracture likely occurred at that time. Patient is not requiring oxygen. Denies SOB or MEJIA.  - PT  - Scheduled tylenol  - Head and ice packs for pain    Hyponatremia  Patient denies any nausea or vomiting and she is not on any diuretic.  This maybe be siadh  - Check UA  - FENA 0.1%  - NS IVF  - Follow BMP    Adult polycystic kidney disease  Hx of kidney transplant  - Continue mycophenolate  - Continue Prednisone  - Add stress dose if patient has fever or has any clinical deterioration along with antibiotics    Depression  - Continue Celexa    GERD  - Continue Pepcid    Hyperlipidemia  - Hold statin    Overactive bladder   - Continue vesicare or its anticholinergic equivalent    Diet: Clear liquid diet, NPO at midnight  DVT Prophylaxis: Pneumatic Compression Devices  Jenkins Catheter: Not present  Lines: None     Cardiac Monitoring: None  Code Status:  FULL       Observation Goals: -diagnostic tests and consults completed and resulted, -vital signs normal or at patient baseline, -tolerating oral intake to maintain hydration, -adequate pain control on oral analgesics, -tolerating oral antibiotics or has plans for home infusion setup, -returns to baseline functional status, -safe disposition plan has been identified, Nurse to notify provider when observation goals have been met and patient is ready for discharge.  Diet: NPO for Medical/Clinical Reasons Except for: Meds    DVT Prophylaxis: Pneumatic Compression Devices  Jenkins Catheter: Not present  Lines: None     Cardiac Monitoring: None  Code Status: Full Code      Clinically Significant Risk Factors Present on Admission         # Hyponatremia: Lowest Na = 128 mmol/L in last 2 days, will monitor as appropriate          # Hypertension: Noted on problem list                    Disposition Plan     Medically Ready for Discharge: Anticipated in 2-4 Days           The patient's care was discussed with the Attending Physician, Dr. Gao,  Bedside Nurse, and Patient.    Moises Elder NP  Hospitalist Service  [facility]  Securely message with [employer] (more info)  Text page via [employer] Paging/Directory   ______________________________________________________________________    Interval History   No acute events overnight. Patient continues to have abdominal pain, which is exacerbated with movement. She denies any fevers, chills, nausea, vomiting, shortness of breath, lightheadedness, or dizziness.     Physical Exam   Vital Signs: Temp: 98.4  F (36.9  C) Temp src: Oral BP: 122/64 Pulse: 111   Resp: 18 SpO2: (!) 84 % O2 Device: None (Room air)    Weight: 108 lbs 0 oz  General:  Appears stated age, no acute distress. A&O x 3.  Skin:  Warm, dry. No rashes or lesions on exposed skin.  HEENT:  Normocephalic, atraumatic.  Neck:  Supple.  Chest:  Breath sounds CTA and no increased work of breathing on room air.  Cardiovascular:  RRR, no rub or murmur. No peripheral edema.  Abdomen:  distended, tenderness throughout all 4 quadrants with palpation, but no rebound tenderness or gaurding.   Musculoskeletal:  Moves all four extremities. No muscle atrophy.  Neurological:  No focal neurological deficits.   Psychiatric:  Affect and mood congruent.      Medical Decision Making       60 MINUTES SPENT BY ME on the date of service doing chart review, history, exam, documentation & further activities per the note.      Data         Imaging results reviewed over the past 24 hrs:   Recent Results (from the past 24 hour(s))   NM Hepatobiliary Scan with GB EF and/or Pharm    Narrative    EXAM: NM HEPATOBILIARY SCAN WITH GB EF AND/OR PHARM  LOCATION: [facility]  DATE: 8/5/2024    INDICATION: Right upper quadrant abdominal pain  COMPARISON: Abdominal ultrasound dated 8/4/2024.  TECHNIQUE: 7.0 mCi of technetium-99m mebrofenin, IV. Anterior planar imaging of the abdomen. 1.0 mcg of cholecystokinin analog, IV. Gallbladder imaging  for 30-60 minutes.    FINDINGS: Normal radionuclide activity in liver, gallbladder, bile ducts, and small bowel. No evidence of cystic or common duct obstruction or intrinsic liver disease. Gallbladder ejection fraction measures 77% (Normal range = 35% or greater).      Impression    IMPRESSION:     Negative for cholecystitis and biliary dyskinesia.

## 2024-08-05 NOTE — PLAN OF CARE
PRIMARY Concern: RUQ abd pain  SAFETY RISK Concerns (fall risk, behaviors, etc.): fall risk      Isolation/Type: n/a  Tests/Procedures for NEXT shift: labs  Consults? (Pending/following, signed-off?) GI following, Surgery following  Where is patient from? (Home, TCU, etc.): home w/   Other Important info for NEXT shift: xray showed fx of R 7th rib  Anticipated DC date & active delays: 1-2 days  _____________________________________________________________________________  SUMMARY NOTE:  Orientation/Cognitive: A&Ox4, forgetful  Observation Goals (Met/ Not Met): Not Met  Mobility Level/Assist Equipment: Ax1, gb, walker  Antibiotics & Plan (IV/po, length of tx left): n/a  Pain Management: c/o RUQ abd pain, found to have a R 7th rib fx which is likely the source of the pain  Tele/VS/O2: VSS on RA  ABNL Lab/BG: Na 127  Diet: tolerating a regular diet  Bowel/Bladder: WDL  Skin Concerns: scattered bruising  Drains/Devices: PIV SL  Patient Stated Goal for Today: eat

## 2024-08-05 NOTE — PROGRESS NOTES
Observation goals  PRIOR TO DISCHARGE        Comments:   -diagnostic tests and consults completed and resulted not met, pending HIDA scan  -vital signs normal or at patient baseline met  -tolerating oral intake to maintain hydration met  -adequate pain control on oral analgesics met  -tolerating oral antibiotics or has plans for home infusion setup n/a  -returns to baseline functional status partially met  -safe disposition plan has been identified not met  Nurse to notify provider when observation goals have been met and patient is ready for discharge.

## 2024-08-05 NOTE — PROGRESS NOTES
Observation goals  PRIOR TO DISCHARGE       Comments:   -diagnostic tests and consults completed and resulted- not met, pending HIDA scan  -vital signs normal or at patient baseline- met  -tolerating oral intake to maintain hydration- met  -adequate pain control on oral analgesics- met  -tolerating oral antibiotics or has plans for home infusion setup- n/a  -returns to baseline functional status- partially met  -safe disposition plan has been identified- not met

## 2024-08-05 NOTE — CONSULTS
GASTROENTEROLOGY CONSULTATION    Maria E Webster  [address] [facility] [address]  83 year old female    Admission Date/Time: 8/4/2024  Primary Care Provider: Maria Del Rosario Reynolds    We were asked to see the patient in consultation by  for evaluation of abdominal pain, cholelithiasis without cholecystitis, consideration of EGD.     HPI: Maria E Webster is a 83 year old female with PMH including adult polycytic kidney disease, s/p Kidney transplant, diverticulitis, hypertension, hernia, depression, overactive bladder, hyperlipidemia, and small bowel obstruction who presents with RUQ abdominal/flank pain.     Maria E reports developing RUQ pain 3-4 days ago. It feels like a pinching sensation. Worse with certain positions. Has made it hard to get comfortable and sleep at night. No postprandial pain, nausea, vomiting, chills or fever. No bowel habit change, melena or hematochezia. No new medications. She reports falling 2 weeks ago, and it sounds like she fell on that side.     Labs on admission: Na 128, normal LFTs and lipase, Hgb 11.6. WBC 6.5.     CT Abdomen/Pelvis w/o contrast: mildly distended gallbladder, right middle and lower lobe atelectasis, trace right effusion, small sliding type hiatal hernia, unchanged diffuse hepatic cysts, sequela of autosomal dominant polycystic kidney disease with lower quadrant transplant kidney, ill defined 3.2cm soft tissue density near the right aspect of the vaginal cuff of unclear etiology. Nonemergent pelvic ultrasound for further evaluation is recommended.     US showed multiple small gallstones. Mild gallbladder wall thickening measuring 4mm. Negative sonographic Vila's sign. CBD 13mm.     HIDA scan was normal with no cholelithiasis or evidence of biliary dyskinesia (EF 77%).     CT Abdomen/pelvis without contrast 6/21/24: new common duct dilatation 12mm, associated mild intrahepatic ductal dilation. LFTs normal. Found to have a small bowel obstruction.  Moderate volume of fecal material throughout colon. Dilated proximal small bowel loops and stomach. Anterior/mid pelvic transition point compatible with obstruction. Distal small bowel loops are decompressed. Proximal   duodenal lipoma redemonstrated.    Family hx is positive for gallbladder disease in her sister.     Non-smoker. Rare alcohol use.    ROS: A comprehensive ten point review of systems was negative aside from those in mentioned in the HPI.      MEDICATIONS:   Current Facility-Administered Medications   Medication Dose Route Frequency Provider Last Rate Last Admin    acetaminophen (TYLENOL) Suppository 650 mg  650 mg Rectal Q4H PRN Tray Goddard MD        acetaminophen (TYLENOL) tablet 975 mg  975 mg Oral BID PRN Tray Goddard MD   975 mg at 08/04/24 1444    citalopram (celeXA) tablet 10 mg  10 mg Oral Daily Tray Goddard MD   10 mg at 08/05/24 0906    famotidine (PEPCID) tablet 20 mg  20 mg Oral Daily Tray Goddard MD   20 mg at 08/05/24 0906    mycophenolate (GENERIC EQUIVALENT) capsule 500 mg  500 mg Oral BID Tray Goddard MD   500 mg at 08/05/24 0905    naloxone (NARCAN) injection 0.2 mg  0.2 mg Intravenous Q2 Min PRN Tray Goddard MD        Or    naloxone (NARCAN) injection 0.4 mg  0.4 mg Intravenous Q2 Min PRN Tray Goddard MD        Or    naloxone (NARCAN) injection 0.2 mg  0.2 mg Intramuscular Q2 Min PRN Tray Goddard MD        Or    naloxone (NARCAN) injection 0.4 mg  0.4 mg Intramuscular Q2 Min PRN Tray Goddard MD        ondansetron (ZOFRAN ODT) ODT tab 4 mg  4 mg Oral Q6H PRN Tray Goddard MD        Or    ondansetron (ZOFRAN) injection 4 mg  4 mg Intravenous Q6H PRN Tray Goddard MD        predniSONE (DELTASONE) tablet 5 mg  5 mg Oral Daily Tray Goddard MD   5 mg at 08/05/24 0906    senna-docusate (SENOKOT-S/PERICOLACE) 8.6-50 MG per tablet 1 tablet  1 tablet Oral BID PRN Tray Goddard MD        Or     senna-docusate (SENOKOT-S/PERICOLACE) 8.6-50 MG per tablet 2 tablet  2 tablet Oral BID PRN Tray Goddard MD        sodium chloride 0.9 % infusion   Intravenous Continuous Moises Elder, CONRAD        tolterodine ER (DETROL LA) 24 hr capsule 2 mg  2 mg Oral At Bedtime Tray Goddard MD   2 mg at 08/04/24 2132       ALLERGIES:   Allergies   Allergen Reactions    Codeine Nausea and Vomiting    Fentanyl Nausea and Nausea and Vomiting    Morphine Nausea and Vomiting    Oxycodone-Acetaminophen Nausea and Nausea and Vomiting     Okay with plain oxycodone at low doses           Past Medical History:   Diagnosis Date    Arthritis     Diverticulitis of small intestine with perforation 01/10/2022    Diverticulosis of colon (without mention of hemorrhage)     Essential hypertension, benign 07/15/2003    Hernia, abdominal     Polycystic kidney, unspecified type     Kidney transplant    PONV (postoperative nausea and vomiting)     Rectocele     S/P hysterectomy 03/23/2023    Senile osteoporosis 05/04/2023    Status post kidney transplant 07/19/2017    Unspecified essential hypertension     Vaginal vault prolapse 07/19/2017       Past Surgical History:   Procedure Laterality Date    COLPORRHAPHY ANTERIOR, POSTERIOR, COMBINED N/A 3/23/2023    Procedure: Complete Colpectomy, Anterior/Posterior Repair, Enterocele Repair, Levator Pelvic Floor Muscle Plication, Perineorrhaphy, Excision of vulvar cysts X 4;  Surgeon: Guerita Aragon MD;  Location: [facility] OR    CYSTOSCOPY      CYSTOSCOPY, SLING TRANSVAGINAL N/A 3/23/2023    Procedure: Retropubic Midurethral Sling and Cystoscopy;  Surgeon: Guerita Aragon MD;  Location:  OR     CORRECT BUNION,SIMPLE      HEMORRHOIDECTOMY      HERNIA REPAIR      TYMPANOPLASTY  05/09/2012    Procedure:TYMPANOPLASTY; Left Tympanoplasty ; Surgeon:BROOKE SANCHEZ; Location: OR    [facility] TRANSPLANTATION OF KIDNEY  2009    left    Z VAGINAL HYSTERECTOMY  1996    prolapse uterus, bilat. S&O  "   [facility] COLONOSCOPY THRU STOMA, DIAGNOSTIC  05/2003    few diverticulae       SOCIAL HISTORY:  Social History     Tobacco Use    Smoking status: Never    Smokeless tobacco: Never   Substance Use Topics    Alcohol use: Yes     Comment: 0-1 per week    Drug use: No       FAMILY HISTORY:  Family History   Problem Relation Age of Onset    Genitourinary Problems Father         polycystic kidneys in family    Depression Mother     Hypertension Mother     Depression Sister     Depression Sister     Depression Brother     Hypertension Sister        PHYSICAL EXAM:   BP (!) 165/94 (Cuff Size: Adult Small)   Pulse 102   Temp 98.4  F (36.9  C) (Oral)   Resp 16   Ht 1.499 m (4' 11\")   Wt 49 kg (108 lb)   SpO2 96%   BMI 21.81 kg/m     Constitutional: alert, no acute distress  Cardiovascular: regular rate and rhythm  Respiratory: clear to auscultation bilaterally  Psychiatric: normal pleasant affect  ENT: no scleral icterus   Abdomen: soft, +RUQ tenderness with mild rebound non-distended, normally active bowel sounds.   Neuro: Neurologically intact grossly  Skin: warm, dry, no rashes are noted      LABORATORY WORK  Recent Labs   Lab Test 08/04/24  0702 06/22/24  0831 06/21/24  1843   WBC 6.5 5.2 6.4   HGB 11.6* 12.7 13.5   MCV 93 92 94    245 311     Recent Labs   Lab Test 08/04/24  0702 06/22/24  0831 06/21/24  1843   * 136 132*   POTASSIUM 4.0 3.6 4.2   CHLORIDE 94* 102 96*   CO2 26 24 24   BUN 22.1 8.5 12.9   CR 0.55 0.48* 0.50*   ANIONGAP 8 10 12   BERNARDA 9.1 8.5* 9.7     Recent Labs   Lab Test 08/04/24  1252 08/04/24  0702 06/21/24  1843 01/09/22  2049 07/19/17  1209 07/10/17  1026   ALBUMIN  --  3.5 4.4 3.6  --   --    BILITOTAL  --  0.6 0.6 0.4  --   --    ALT  --  18 15 26  --   --    AST  --  13 22 15  --   --    ALKPHOS  --  124 62 51  --   --    PROTEIN 100*  --   --   --  Negative Negative   LIPASE  --  18 46 243  --   --        IMAGING   EXAM: CT ABDOMEN PELVIS W/O CONTRAST  LOCATION: [facility]  DATE: 8/4/2024     INDICATION: abdominal pain and distention  COMPARISON: CT abdomen pelvis 6/21/2024  TECHNIQUE: CT scan of the abdomen and pelvis was performed without IV contrast. Multiplanar reformats were obtained. Dose reduction techniques were used.  CONTRAST: None.     FINDINGS:   LOWER CHEST: Right middle and lower lobe atelectasis. Trace right effusion.     HEPATOBILIARY: Unchanged diffuse hepatic cysts. Mildly distended gallbladder.     PANCREAS: Unremarkable.     SPLEEN: Unremarkable.     ADRENAL GLANDS: Normal.     KIDNEYS/BLADDER: Similar sequela of autosomal dominant polycystic kidney disease of the bilateral native kidneys. No hydronephrosis.     Unchanged contours of the left lower quadrant transplant kidney. No hydronephrosis. Urinary bladder is unremarkable.     BOWEL: No bowel obstruction. Small sliding-type hiatal hernia. Intramural lipoma in the duodenum again noted. Trace pelvic ascites.     LYMPH NODES: No lymphadenopathy.     VASCULATURE: The abdominal aorta is nonaneurysmal with severe circumferential atherosclerotic calcifications.     PELVIC ORGANS: 2.9 x 3.2 cm soft tissue in the pelvis near the vaginal cuff (4/208).     MUSCULOSKELETAL: Unchanged mild L3 inferior endplate deformity. Healed right pubic and right rib fractures. No worrisome bone lesions.                                                                      IMPRESSION:   1.  No bowel obstruction or other acute abnormality in the abdomen or pelvis on noncontrast CT.  2.  Sequela of autosomal dominant polycystic kidney disease with left lower quadrant transplant kidney. No hydronephrosis.  3.  Trace right effusion and trace pelvic ascites, which may be related to volume status.  4.  Ill-defined 3.2 cm soft tissue near the right aspect of the vaginal cuff of unclear etiology. Nonemergent pelvic ultrasound for further evaluation is recommended.      EXAM: US ABDOMEN LIMITED  LOCATION: [facility]  DATE: 8/4/2024     INDICATION: RUq pain  COMPARISON: CT abdomen pelvis 08/04/2024.  TECHNIQUE: Limited abdominal ultrasound.     FINDINGS:     GALLBLADDER: Multiple small gallstones are present. Mild gallbladder wall thickening measuring up to 4 mm in thickness. No pericholecystic fluid. Negative sonographic Vila's sign.     BILE DUCTS: The common bile duct is dilated measuring up to 13 mm in diameter. No intraductal calculus is identified by sonography.     LIVER: Redemonstrated innumerable hepatic cysts of varying size and complexity.     RIGHT KIDNEY: Redemonstrated sequela of autosomal dominant polycystic kidney disease with innumerable cysts of varying size and complexity and parenchymal calcifications.     PANCREAS: The visualized portions are normal.     No ascites.                                                       IMPRESSION:  1.  Cholelithiasis with mild gallbladder wall thickening. Findings are nonspecific given negative sonographic Vila's sign and absence of pericholecystic fluid. Differential considerations include chronic cholecystitis or developing acute cholecystitis.   HIDA could further evaluate as clinically indicated.  2.  The common bile duct is dilated measuring up to 13 mm in diameter. No intraductal calculus is identified by sonography. Recommend correlation with clinical and laboratory values.  3.  Redemonstrated sequela of autosomal dominant polycystic kidney disease.    EXAM: NM HEPATOBILIARY SCAN WITH GB EF AND/OR PHARM  LOCATION: [facility]  DATE: 8/5/2024     INDICATION: Right upper quadrant abdominal pain  COMPARISON: Abdominal ultrasound dated 8/4/2024.  TECHNIQUE: 7.0 mCi of technetium-99m mebrofenin, IV. Anterior planar imaging of the abdomen. 1.0 mcg of cholecystokinin analog, IV. Gallbladder imaging for 30-60 minutes.     FINDINGS: Normal radionuclide activity in liver, gallbladder, bile ducts, and small bowel. No  evidence of cystic or common duct obstruction or intrinsic liver disease. Gallbladder ejection fraction measures 77% (Normal range = 35% or greater).                                                                      IMPRESSION:      Negative for cholecystitis and biliary dyskinesia.   CONSULTATION ASSESSMENT AND PLAN  83 year old female with PMH including adult polycytic kidney disease, s/p Kidney transplant, diverticulitis, hypertension, hernia, depression, overactive bladder, hyperlipidemia, and small bowel obstruction who presents with RUQ abdominal/flank pain. Gallbladder looked distended on CT scan. Gallstones and mild gallbladder wall thickening noted on US. HIDA neg for acute cholecystitis or biliary dyskinesia. CT 6/24 showed CBD 12mm, US showed CBD 13mm, but LFTs have been normal and HIDA scan was normal. Pain likely related to her gallbladder, but will proceed with EGD to rule out PUD/gastritis/stomach/small bowel abnormality which could cause referred pain. Consider musculoskeletal cause as well given recent fall.     Plan  -EGD today   -XR to evaluate for rib fracture  -Monitor LFTs/WBC    I will discuss the patient plan with Dr. Salcedo. Thank you for asking us to participate in the care of this patient.    Total time: 35 minutes     Livia Newby CNP  [facility]  Cell: [phone]  Office: [phone]      Staff addendum: 83 yof admitted with RUQ pain with bending and twisting. No relation to eating. HiDA negative. CT neg. Ultrasound showed CBD dilation but liver tests normal. EGD showed no reason for pain    RUQ pain unclear etiology pain with palpation of right ribs  -Rib X ray today  - Reg diet ok  -Consider EUS to look at CBD better but no sign of obstruction on HIDA and liver tests nl      Total time 20 min    Avis Salcedo MD  [facility]  Phone [phone] until 5 pm  Office [phone] for after hours on call provider

## 2024-08-05 NOTE — PROGRESS NOTES
Observation goals  PRIOR TO DISCHARGE        Comments: -diagnostic tests and consults completed and resulted Not Met  -vital signs normal or at patient baseline Met  -tolerating oral intake to maintain hydration Met  -adequate pain control on oral analgesics Met  -tolerating oral antibiotics or has plans for home infusion setup Met  -returns to baseline functional status Not Met  -safe disposition plan has been identified Not Met  Nurse to notify provider when observation goals have been met and patient is ready for discharge.

## 2024-08-05 NOTE — PROGRESS NOTES
Patient not seen as she is off the floor apparently getting ready to undergo EGD.  Chart reviewed.  Imaging reviewed.  HIDA scan completed and negative for evidence of cholecystitis.  At this time no indication for urgent intervention.  Await completion of remainder of diagnostic evaluation.

## 2024-08-05 NOTE — PLAN OF CARE
Goal Outcome Evaluation:  Shift [id number]    PRIMARY Concern: RUQ abd/flank pain for 2 days.   SAFETY RISK Concerns (fall risk, behaviors, etc.): Falls, Behavior: green      Isolation/Type: None  Tests/Procedures for NEXT shift: HIDA scan  Consults? (Pending/following, signed-off?): Gen Surgery following  Where is patient from? (Home, TCU, etc.):Home. Lives w/ Spouse  Other Important info for NEXT shift: No pain meds after midnight!! Hx hernia, polycystic kidney(Kidney transplant) and small bowel obstruction. Possible EGD if HIDA scan neg  Anticipated DC date & active delays: TBD, pending HIDA scan result  ____________________________________  SUMMARY NOTE:  Orientation/Cognitive: A/OX4. Forgetful at times  Observation Goals (Met/ Not Met): Not met  Mobility Level/Assist Equipment: A1 gbW, up to BSC  Antibiotics & Plan (IV/po, length of tx left): None  Pain Management: PRN Tylenol tolerating well. PRN IV Dilaudid available.   Tele/VS/O2: Slightly elevated BP, Tachy  ABNL Lab/BG: Na 128, Hgb 11.6, UC pending.   Diet: NPO  Bowel/Bladder: Cont.  Skin Concerns: Scattered bruises.   Drains/Devices: PIV inf LR @ 75 mL/hr  Patient Stated Goal for Today: Pain management.

## 2024-08-05 NOTE — PLAN OF CARE
Goal Outcome Evaluation:  PRIMARY Concern: RUQ abd/flank pain for 2 days.   SAFETY RISK Concerns (fall risk, behaviors, etc.): Fall risk   Isolation/Type: None  Tests/Procedures for NEXT shift: HIDA scan  Consults? (Pending/following, signed-off?): Gen Surgery following  Where is patient from? (Home, TCU, etc.):Home. Lives w/ Spouse  Other Important info for NEXT shift: Possible EGD if HIDA scan neg  Anticipated DC date & active delays: TBD, pending HIDA scan result    SUMMARY NOTE:    Orientation/Cognitive: A/OX4. Forgetful at times  Observation Goals (Met/ Not Met): Not met  Mobility Level/Assist Equipment: Ax1 GB/Walker  Antibiotics & Plan (IV/po, length of tx left): None  Pain Management: Denies pain   Tele/VS/O2: VSS on RA  ABNL Lab/BG: Na 128, Hgb 11.6, UC pending.   Diet: NPO  Bowel/Bladder: Continent  Skin Concerns: Scattered bruises.   Drains/Devices: PIV inf LR @ 75 mL/hr  Patient Stated Goal for Today: Rest          Observation goals  PRIOR TO DISCHARGE       Comments:   -diagnostic tests and consults completed and resulted- not met, pending HIDA scan  -vital signs normal or at patient baseline- met  -tolerating oral intake to maintain hydration- met  -adequate pain control on oral analgesics- met  -tolerating oral antibiotics or has plans for home infusion setup- n/a  -returns to baseline functional status- partially met  -safe disposition plan has been identified- not met

## 2024-08-05 NOTE — PROGRESS NOTES
GI: Rib Xray showed fx of right 7th rib. This is likely the cause of her pain since pain is exacerbated by movement and no relation to eating.  Suspect cholelithiasis is incidental finding.     Will sign off. Call with questions      Avis Salcedo MD  [facility]  Phone [phone] until 5 pm  Office [phone] for after hours on call provider

## 2024-08-06 VITALS
OXYGEN SATURATION: 94 % | RESPIRATION RATE: 16 BRPM | DIASTOLIC BLOOD PRESSURE: 76 MMHG | HEART RATE: 111 BPM | TEMPERATURE: 97.3 F | WEIGHT: 108 LBS | HEIGHT: 59 IN | BODY MASS INDEX: 21.77 KG/M2 | SYSTOLIC BLOOD PRESSURE: 139 MMHG

## 2024-08-06 LAB
ALBUMIN SERPL BCG-MCNC: 2.5 G/DL (ref 3.5–5.2)
ALP SERPL-CCNC: 157 U/L (ref 40–150)
ALT SERPL W P-5'-P-CCNC: 16 U/L (ref 0–50)
ANION GAP SERPL CALCULATED.3IONS-SCNC: 10 MMOL/L (ref 7–15)
AST SERPL W P-5'-P-CCNC: 14 U/L (ref 0–45)
BILIRUB SERPL-MCNC: 0.5 MG/DL
BUN SERPL-MCNC: 14.4 MG/DL (ref 8–23)
CALCIUM SERPL-MCNC: 8.2 MG/DL (ref 8.8–10.4)
CHLORIDE SERPL-SCNC: 95 MMOL/L (ref 98–107)
CREAT SERPL-MCNC: 0.42 MG/DL (ref 0.51–0.95)
EGFRCR SERPLBLD CKD-EPI 2021: >90 ML/MIN/1.73M2
ERYTHROCYTE [DISTWIDTH] IN BLOOD BY AUTOMATED COUNT: 13.8 % (ref 10–15)
GGT SERPL-CCNC: 71 U/L (ref 5–36)
GLUCOSE BLDC GLUCOMTR-MCNC: 112 MG/DL (ref 70–99)
GLUCOSE BLDC GLUCOMTR-MCNC: 122 MG/DL (ref 70–99)
GLUCOSE BLDC GLUCOMTR-MCNC: 139 MG/DL (ref 70–99)
GLUCOSE SERPL-MCNC: 117 MG/DL (ref 70–99)
HCO3 SERPL-SCNC: 22 MMOL/L (ref 22–29)
HCT VFR BLD AUTO: 33.3 % (ref 35–47)
HGB BLD-MCNC: 11 G/DL (ref 11.7–15.7)
MCH RBC QN AUTO: 30.3 PG (ref 26.5–33)
MCHC RBC AUTO-ENTMCNC: 33 G/DL (ref 31.5–36.5)
MCV RBC AUTO: 92 FL (ref 78–100)
PLATELET # BLD AUTO: 266 10E3/UL (ref 150–450)
POTASSIUM SERPL-SCNC: 4.1 MMOL/L (ref 3.4–5.3)
PROT SERPL-MCNC: 5.5 G/DL (ref 6.4–8.3)
RBC # BLD AUTO: 3.63 10E6/UL (ref 3.8–5.2)
SODIUM SERPL-SCNC: 127 MMOL/L (ref 135–145)
WBC # BLD AUTO: 6.3 10E3/UL (ref 4–11)

## 2024-08-06 PROCEDURE — 99239 HOSP IP/OBS DSCHRG MGMT >30: CPT

## 2024-08-06 PROCEDURE — 85014 HEMATOCRIT: CPT

## 2024-08-06 PROCEDURE — 82962 GLUCOSE BLOOD TEST: CPT

## 2024-08-06 PROCEDURE — 99232 SBSQ HOSP IP/OBS MODERATE 35: CPT | Performed by: PHYSICIAN ASSISTANT

## 2024-08-06 PROCEDURE — 250N000013 HC RX MED GY IP 250 OP 250 PS 637: Performed by: INTERNAL MEDICINE

## 2024-08-06 PROCEDURE — 250N000013 HC RX MED GY IP 250 OP 250 PS 637

## 2024-08-06 PROCEDURE — 80053 COMPREHEN METABOLIC PANEL: CPT

## 2024-08-06 PROCEDURE — 250N000012 HC RX MED GY IP 250 OP 636 PS 637: Performed by: INTERNAL MEDICINE

## 2024-08-06 PROCEDURE — 36415 COLL VENOUS BLD VENIPUNCTURE: CPT

## 2024-08-06 PROCEDURE — G0378 HOSPITAL OBSERVATION PER HR: HCPCS

## 2024-08-06 RX ORDER — LIDOCAINE 4 G/G
1 PATCH TOPICAL EVERY 24 HOURS
Qty: 15 PATCH | Refills: 0 | Status: SHIPPED | OUTPATIENT
Start: 2024-08-06

## 2024-08-06 RX ORDER — LIDOCAINE 4 G/G
1 PATCH TOPICAL
Status: DISCONTINUED | OUTPATIENT
Start: 2024-08-07 | End: 2024-08-06

## 2024-08-06 RX ORDER — POLYETHYLENE GLYCOL 3350 17 G/17G
17 POWDER, FOR SOLUTION ORAL DAILY
Status: DISCONTINUED | OUTPATIENT
Start: 2024-08-06 | End: 2024-08-06 | Stop reason: HOSPADM

## 2024-08-06 RX ORDER — LIDOCAINE 4 G/G
1 PATCH TOPICAL
Status: DISCONTINUED | OUTPATIENT
Start: 2024-08-06 | End: 2024-08-06 | Stop reason: HOSPADM

## 2024-08-06 RX ADMIN — CITALOPRAM HYDROBROMIDE 10 MG: 10 TABLET ORAL at 08:23

## 2024-08-06 RX ADMIN — ACETAMINOPHEN 975 MG: 325 TABLET, FILM COATED ORAL at 08:26

## 2024-08-06 RX ADMIN — LIDOCAINE 1 PATCH: 4 PATCH TOPICAL at 10:31

## 2024-08-06 RX ADMIN — MYCOPHENOLATE MOFETIL 500 MG: 250 CAPSULE ORAL at 08:26

## 2024-08-06 RX ADMIN — POLYETHYLENE GLYCOL 3350 17 G: 17 POWDER, FOR SOLUTION ORAL at 10:12

## 2024-08-06 RX ADMIN — FAMOTIDINE 20 MG: 20 TABLET, FILM COATED ORAL at 08:23

## 2024-08-06 RX ADMIN — PREDNISONE 5 MG: 5 TABLET ORAL at 08:23

## 2024-08-06 ASSESSMENT — ACTIVITIES OF DAILY LIVING (ADL)
ADLS_ACUITY_SCORE: 38
ADLS_ACUITY_SCORE: 37
ADLS_ACUITY_SCORE: 37
ADLS_ACUITY_SCORE: 38
ADLS_ACUITY_SCORE: 41
ADLS_ACUITY_SCORE: 37
ADLS_ACUITY_SCORE: 38
ADLS_ACUITY_SCORE: 37

## 2024-08-06 NOTE — PROGRESS NOTES
diagnostic tests and consults completed and resulted  Met  -vital signs normal or at patient baseline met  -tolerating oral intake to maintain hydration met  -adequate pain control on oral analgesics met  -tolerating oral antibiotics or has plans for home infusion setup NA  -returns to baseline functional status not met, SBA- AX1 with GB and walker  -safe disposition plan has been identified not met

## 2024-08-06 NOTE — DISCHARGE SUMMARY
Discharge    Patient discharged to Home via Car with   Care plan note Met    Listed belongings gathered and given to patient (including from security/pharmacy). Yes  Care Plan and Patient education resolved: Yes  Prescriptions if needed, hard copies sent with patient  NA  Medication Bin checked and emptied on discharge Yes  SW/care coordinator/charge RN aware of discharge: Yes

## 2024-08-06 NOTE — PLAN OF CARE
Goal Outcome Evaluation:  PRIMARY Concern: RUQ abd pain  SAFETY RISK Concerns (fall risk, behaviors, etc.): fall risk      Isolation/Type: n/a  Tests/Procedures for NEXT shift: none ordered  Consults? (Pending/following, signed-off?) GI signed off, Surgery following ( on interventions recommended)  Where is patient from? (Home, TCU, etc.): home w/   Other Important info for NEXT shift: Chest  X-ray showed fx of R 7th rib, likely cause of pain  Anticipated DC date & active delays: possible today  _____________________________________________________________________________  SUMMARY NOTE:  Orientation/Cognitive: A&Ox4, forgetful  Observation Goals (Met/ Not Met): Not Met  Mobility Level/Assist Equipment: Ax1, gb, walker  Antibiotics & Plan (IV/po, length of tx left): n/a  Pain Management: PRN tylenol available  Tele/VS/O2: VSS on RA  ABNL Lab/BG: Na 127  Diet: Regular  Bowel/Bladder: WDL  Skin Concerns: scattered bruising  Drains/Devices: PIV SL  Patient Stated Goal for Today: Hopefully go home      diagnostic tests and consults completed and resulted Met  -vital signs normal or at patient baseline met  -tolerating oral intake to maintain hydration met  -adequate pain control on oral analgesics met  -tolerating oral antibiotics or has plans for home infusion setup NA  -returns to baseline functional status not met, SBA- AX1 with GB and walker  -safe disposition plan has been identified not met

## 2024-08-06 NOTE — PROGRESS NOTES
PRIMARY Concern: RUQ abd pain  SAFETY RISK Concerns (fall risk, behaviors, etc.): fall risk      Isolation/Type: n/a  Tests/Procedures for NEXT shift: none ordered  Consults? (Pending/following, signed-off?) GI following, Surgery following  Where is patient from? (Home, TCU, etc.): home w/   Other Important info for NEXT shift: Chest  X-ray showed fx of R 7th rib, likely cause of pain  Anticipated DC date & active delays: possible tomorrow  _____________________________________________________________________________  SUMMARY NOTE:  Orientation/Cognitive: A&Ox4, forgetful  Observation Goals (Met/ Not Met): Not Met  Mobility Level/Assist Equipment: Ax1, gb, walker  Antibiotics & Plan (IV/po, length of tx left): n/a  Pain Management: c/o RUQ abd pain, found to have a R 7th rib fx which is likely the source of the pain, tylenol given, recheck pending  Tele/VS/O2: VSS on RA  ABNL Lab/BG: Na 127  Diet: tolerating a regular diet  Bowel/Bladder: WDL  Skin Concerns: scattered bruising  Drains/Devices: PIV SL  Patient Stated Goal for Today: to get some good night's sleep

## 2024-08-06 NOTE — PROGRESS NOTES
"General Surgery Progress Note    Admission Date: 8/4/2024  Today's Date: 8/6/2024         Assessment:      Maria E Webster is a 83 year old female admitted with right flank pain and concern for gallbladder disease. Ultrasound with slightly thickened gallbladder wall, HIDA scan negative for cholecystitis and biliary dyskinesia.     Found to have right 7th rib fracture and right pleural effusion with associated atelectasis, no pneumothorax.         Plan:   - Gallbladder not source of pain. Also had EGD which was negative for acute finding, GI has signed off  - Discussed expectations regarding rib fracture with patient and importance of ambulation, pulmonary toilet, etc.   - Added Lidocaine patch for pain, also prescribed for discharge  - No further input from surgical standpoint, no ongoing surgical needs. We will sign off, please call with questions.   - Final discharge planning per hospitalist        Interval History:   Maria E is feeling ok today, tolerating diet. Right flank pain continues and is exacerbated by certain movements. Can find a comfortable position at rest. Tolerating diet, denies abdominal pain or nausea.          Physical Exam:   /76 (BP Location: Right arm)   Pulse 111   Temp 97.3  F (36.3  C) (Oral)   Resp 16   Ht 1.499 m (4' 11\")   Wt 49 kg (108 lb)   SpO2 94%   BMI 21.81 kg/m    No intake/output data recorded.  General: NAD, pleasant, alert and oriented x3  Respiratory: non-labored breathing. On room air  Abdomen: soft, completely nontender, nondistended.   Chest wall: localized area of tenderness right flank over site of 7th rib fracture, no deformities    LABS:  Recent Labs   Lab Test 08/06/24  0606 08/05/24  1628 08/04/24  0702   WBC 6.3 7.3 6.5   HGB 11.0* 10.8* 11.6*   MCV 92 93 93    273 248      Recent Labs   Lab Test 08/06/24  0606 08/05/24  1628 08/04/24  0702   POTASSIUM 4.1 4.5 4.0   CHLORIDE 95* 95* 94*   CO2 22 20* 26   BUN 14.4 16.5 22.1   CR 0.42* 0.42* 0.55 "   ANIONGAP 10 12 8      Recent Labs   Lab Test 08/06/24  0606 08/05/24  1628 08/04/24  0702   ALBUMIN 2.5* 2.6* 3.5   BILITOTAL 0.5 0.4 0.6   ALT 16 19 18   AST 14 23 13   ALKPHOS 157* 181* 124            -------------------------------    Terri Stover PA-C  [facility]  [phone]

## 2024-08-06 NOTE — DISCHARGE SUMMARY
[facility]  Hospitalist Discharge Summary      Date of Admission:  8/4/2024  Date of Discharge:  8/6/2024  Discharging Provider: Moises Elder NP  Discharge Service: Hospitalist Service    Discharge Diagnoses   Abdominal pain  Chololithiasis without cholecystitis  Right pleural effusion  Fracture of right seventh rib  Hyponatremia    Clinically Significant Risk Factors          Follow-ups Needed After Discharge   Follow-up Appointments     Follow-up and recommended labs and tests       Follow up with primary care provider, Maria Del Rosario Reynolds, within 7   days for hospital follow- up.  The following labs/tests are recommended:   Sodium, possible follow-up chest x-ray.            Unresulted Labs Ordered in the Past 30 Days of this Admission       Date and Time Order Name Status Description    8/5/2024  1:58 PM Surgical Pathology Exam In process         These results will be followed up by hospitalist and [facility].    Discharge Disposition   Discharged to home  Condition at discharge: Stable    Hospital Course   Maria E Webster is a 83 year old female with a history of diverticulitis, hypertension, hernia, polycystic kidney and small bowel obstruction who presents with her  for an evaluation of abdominal pain. The patient stated having right abdominal pain that began yesterday and worsened over night. She added being unable to sleep. She denies nausea, vomiting, diarrhea, constipation or fever. She also denies taking pain medications. Her  noted she has had bladder surgery recently and a kidney transplant about 15 years ago. He also noted she has her gallbladder.     Patient presented afebrile, normal wbc, BMP is normal except Na of 128, LFT's normal, LA is normal, Lipase is normal. CT scan of abd/pelvis WO contrast shows mildly distended GB, no obstruction, prior kidney txp LLQ and sequalae of  adult polycystic kidney disease.  RUQ US showed gallstone with mild GB wall  thickening, negative sonographic Vila's sign, CBD 13mm, no intraductal stones identified.      Abdominal pain  Mild GB wall thickening with cholelithiasis  Patient presents with 24 hours of RUQ pain without any fevers, chills or sweats with prior hx of SBO, kidney transplantation.  - Admit to observation status  - IVF discontinued  - Tylenol for mild pain and IV dilaudid for mod-severe pain (stop all narcotics at midnight in anticipation of HIDA scan in AM)  - HIDA scan negative for acute cholecystitis   - General surgery has no further recommendations at this time.   - [facility] for consideration of EGD   - EGD shows benign appearing esophageal stenosis, biopsied. No gross lesion in the entire stomach. Normal duodenum. No reason for pain found on exam.   - Tolerating regular diet    Right pleural effusion  Fracture of the right seventh rib  Patient's  reports patient [place]. Rib fracture likely occurred at that time. Patient is not requiring oxygen. Denies SOB or MEJIA. Lungs clear, diminished in RLL. Non-labored breathing pattern, on room air  - Scheduled tylenol  - Head and ice packs for pain  - Discharge with lidocaine patches    Hyponatremia  Patient denies any nausea or vomiting and she is not on any diuretic.   - Check UA  - FENA 0.1%  - Follow BMP wit PCP    Adult polycystic kidney disease  Hx of kidney transplant  - Continue mycophenolate  - Continue Prednisone  - Add stress dose if patient has fever or has any clinical deterioration along with antibiotics    Depression  - Continue Celexa    GERD  - Continue Pepcid    Hyperlipidemia  - Hold statin    Overactive bladder   - Continue vesicare or its anticholinergic equivalent    Diet: Clear liquid diet, NPO at midnight  DVT Prophylaxis: Pneumatic Compression Devices  Jenkins Catheter: Not present  Lines: None     Cardiac Monitoring: None  Code Status:  FULL    Consultations This Hospital Stay   PHARMACY IP  CONSULT  GASTROENTEROLOGY IP CONSULT    Code Status   Full Code    Time Spent on this Encounter   I, Moises Elder NP, personally saw the patient today and spent greater than 30 minutes discharging this patient.       Moises Elder NP  [facility]  [address] [place] [address]  Phone: [phone]  ______________________________________________________________________    Physical Exam   Vital Signs: Temp: 97.3  F (36.3  C) Temp src: Oral BP: 139/76 Pulse: 111   Resp: 16 SpO2: 94 % O2 Device: None (Room air) Oxygen Delivery: 3 LPM  Weight: 108 lbs 0 oz  General:  Appears stated age, no acute distress. A&O x 3.  Skin:  Warm, dry. No rashes or lesions on exposed skin.  HEENT:  Normocephalic, atraumatic; EOMs grossly intact.  Neck:  Supple.  Chest:  Breath sounds diminished in RLL. No increased work of breathing on room air.  Cardiovascular:  RRR, no rub or murmur. No peripheral edema.  Abdomen:  Soft, non-tender, non-distended.  Musculoskeletal:  Moves all four extremities. No muscle atrophy.  Neurological:  No focal neurological deficits.  Psychiatric:  Affect and mood congruent.         Primary Care Physician   Maria Del Rosraio Reynolds    Discharge Orders      Reason for your hospital stay    You were hospitalized with abdominal pain.  Abdominal CT showed cholelithiasis.  Abdominal ultrasound showed cholelithiasis with gallbladder sludge.  Follow-up HIDA scan did not show evidence of acute cholecystitis.  He had an EGD which was unremarkable for causes of your pain.  Chest x-ray showed right pleural effusion and nondisplaced right seventh rib fracture, which is likely the cause of your pain.     Follow-up and recommended labs and tests     Follow up with primary care provider, Maria Del Rosario Reynolds, within 7 days for hospital follow- up.  The following labs/tests are recommended: Sodium, possible follow-up chest x-ray.     Activity    Your activity  upon discharge: activity as tolerated     When to contact your care team    Call your primary doctor if you have any of the following:  increased shortness of breath or increased pain.     Discharge Instructions    He will be sent home with incentive spirometer.  This will help keep the bases of your lungs inflated and prevent pneumonia.  Sometimes he will breathe more shallow with rib fractures.  This will help keep your lungs healthy and strong.  Recommend to perform this exercise 10 times per hour while awake.     Diet    Follow this diet upon discharge: Orders Placed This Encounter      Regular Diet Adult       Significant Results and Procedures   Most Recent 3 CBC's:  Recent Labs   Lab Test 08/06/24  0606 08/05/24  1628 08/04/24  0702   WBC 6.3 7.3 6.5   HGB 11.0* 10.8* 11.6*   MCV 92 93 93    273 248     Most Recent 3 BMP's:  Recent Labs   Lab Test 08/06/24  1126 08/06/24  0734 08/06/24  0606 08/06/24  0152 08/05/24  1628 08/04/24  0702   NA  --   --  127*  --  127* 128*   POTASSIUM  --   --  4.1  --  4.5 4.0   CHLORIDE  --   --  95*  --  95* 94*   CO2  --   --  22  --  20* 26   BUN  --   --  14.4  --  16.5 22.1   CR  --   --  0.42*  --  0.42* 0.55   ANIONGAP  --   --  10  --  12 8   BERNARDA  --   --  8.2*  --  8.2* 9.1   * 122* 117*   < > 94 123*    < > = values in this interval not displayed.   ,   Results for orders placed or performed during the hospital encounter of 08/04/24   CT Abdomen Pelvis without Contrast (stone protocol)    Narrative    EXAM: CT ABDOMEN PELVIS W/O CONTRAST  LOCATION: [facility]  DATE: 8/4/2024    INDICATION: abdominal pain and distention  COMPARISON: CT abdomen pelvis 6/21/2024  TECHNIQUE: CT scan of the abdomen and pelvis was performed without IV contrast. Multiplanar reformats were obtained. Dose reduction techniques were used.  CONTRAST: None.    FINDINGS:   LOWER CHEST: Right middle and lower lobe atelectasis. Trace right  effusion.    HEPATOBILIARY: Unchanged diffuse hepatic cysts. Mildly distended gallbladder.    PANCREAS: Unremarkable.    SPLEEN: Unremarkable.    ADRENAL GLANDS: Normal.    KIDNEYS/BLADDER: Similar sequela of autosomal dominant polycystic kidney disease of the bilateral native kidneys. No hydronephrosis.    Unchanged contours of the left lower quadrant transplant kidney. No hydronephrosis. Urinary bladder is unremarkable.    BOWEL: No bowel obstruction. Small sliding-type hiatal hernia. Intramural lipoma in the duodenum again noted. Trace pelvic ascites.    LYMPH NODES: No lymphadenopathy.    VASCULATURE: The abdominal aorta is nonaneurysmal with severe circumferential atherosclerotic calcifications.    PELVIC ORGANS: 2.9 x 3.2 cm soft tissue in the pelvis near the vaginal cuff (4/208).    MUSCULOSKELETAL: Unchanged mild L3 inferior endplate deformity. Healed right pubic and right rib fractures. No worrisome bone lesions.      Impression    IMPRESSION:   1.  No bowel obstruction or other acute abnormality in the abdomen or pelvis on noncontrast CT.  2.  Sequela of autosomal dominant polycystic kidney disease with left lower quadrant transplant kidney. No hydronephrosis.  3.  Trace right effusion and trace pelvic ascites, which may be related to volume status.  4.  Ill-defined 3.2 cm soft tissue near the right aspect of the vaginal cuff of unclear etiology. Nonemergent pelvic ultrasound for further evaluation is recommended.     US Abdomen Limited    Narrative    EXAM: US ABDOMEN LIMITED  LOCATION: [facility]  DATE: 8/4/2024    INDICATION: RUq pain  COMPARISON: CT abdomen pelvis 08/04/2024.  TECHNIQUE: Limited abdominal ultrasound.    FINDINGS:    GALLBLADDER: Multiple small gallstones are present. Mild gallbladder wall thickening measuring up to 4 mm in thickness. No pericholecystic fluid. Negative sonographic Vila's sign.    BILE DUCTS: The common bile duct is dilated measuring up to 13  mm in diameter. No intraductal calculus is identified by sonography.    LIVER: Redemonstrated innumerable hepatic cysts of varying size and complexity.    RIGHT KIDNEY: Redemonstrated sequela of autosomal dominant polycystic kidney disease with innumerable cysts of varying size and complexity and parenchymal calcifications.    PANCREAS: The visualized portions are normal.    No ascites.      Impression    IMPRESSION:  1.  Cholelithiasis with mild gallbladder wall thickening. Findings are nonspecific given negative sonographic Vila's sign and absence of pericholecystic fluid. Differential considerations include chronic cholecystitis or developing acute cholecystitis.   HIDA could further evaluate as clinically indicated.  2.  The common bile duct is dilated measuring up to 13 mm in diameter. No intraductal calculus is identified by sonography. Recommend correlation with clinical and laboratory values.  3.  Redemonstrated sequela of autosomal dominant polycystic kidney disease.       NM Hepatobiliary Scan with GB EF and/or Pharm    Narrative    EXAM: NM HEPATOBILIARY SCAN WITH GB EF AND/OR PHARM  LOCATION: [facility]  DATE: 8/5/2024    INDICATION: Right upper quadrant abdominal pain  COMPARISON: Abdominal ultrasound dated 8/4/2024.  TECHNIQUE: 7.0 mCi of technetium-99m mebrofenin, IV. Anterior planar imaging of the abdomen. 1.0 mcg of cholecystokinin analog, IV. Gallbladder imaging for 30-60 minutes.    FINDINGS: Normal radionuclide activity in liver, gallbladder, bile ducts, and small bowel. No evidence of cystic or common duct obstruction or intrinsic liver disease. Gallbladder ejection fraction measures 77% (Normal range = 35% or greater).      Impression    IMPRESSION:     Negative for cholecystitis and biliary dyskinesia.    XR Ribs & Chest Right G/E 3 Views    Narrative    XR RIBS & CHEST RT 3VW 8/5/2024 2:33 PM     HISTORY: recent fall; RUQ pain    COMPARISON: None.          Impression    IMPRESSION: Right pleural effusion with some associated atelectasis of  the right lung base. Left lung clear. No pneumothorax. There is a  fracture of the right seventh rib laterally.    LAURIE IBARRA MD         SYSTEM ID:  [id number]       Discharge Medications   Current Discharge Medication List        START taking these medications    Details   Lidocaine (LIDOCARE) 4 % Patch Place 1 patch onto the skin every 24 hours To prevent lidocaine toxicity, patient should be patch free for 12 hrs daily.  Qty: 15 patch, Refills: 0    Associated Diagnoses: Closed fracture of one rib of right side with routine healing, subsequent encounter           CONTINUE these medications which have NOT CHANGED    Details   acetaminophen (TYLENOL) 325 MG tablet Take 975 mg by mouth 2 times daily as needed for mild pain      calcium carbonate (TUMS) 500 MG chewable tablet Take 2 chew tab by mouth 2 times daily Lunch & dinner      citalopram (CELEXA) 10 MG tablet Take 10 mg by mouth daily      cyanocobalamin 1000 MCG sublingual tablet Place 1 tablet under the tongue daily      estradiol (ESTRACE) 0.1 MG/GM vaginal cream Place 1 g vaginally daily      famotidine (PEPCID) 20 MG tablet Take 20 mg by mouth daily      mycophenolate (GENERIC EQUIVALENT) 250 MG capsule Take 500 mg by mouth 2 times daily At 0830 and 2030      polyethylene glycol (MIRALAX) 17 GM/Dose powder Take 17 g by mouth daily as needed      predniSONE (DELTASONE) 5 MG tablet Take 5 mg by mouth daily At 0830 with mycophenolate      simvastatin (ZOCOR) 5 MG tablet Take 1 tablet (5 mg) by mouth at bedtime      solifenacin (VESICARE) 5 MG tablet Take 5 mg by mouth daily      vitamin D3 (CHOLECALCIFEROL) 50 mcg (2000 units) tablet Take 1 tablet by mouth daily           Allergies   Allergies   Allergen Reactions    Codeine Nausea and Vomiting    Fentanyl Nausea and Nausea and Vomiting    Morphine Nausea and Vomiting    Oxycodone-Acetaminophen Nausea and Nausea and  Vomiting     Okay with plain oxycodone at low doses

## 2024-08-06 NOTE — PROGRESS NOTES
-diagnostic tests and consults completed and resulted Met  -vital signs normal or at patient baseline met  -tolerating oral intake to maintain hydration met  -adequate pain control on oral analgesics met  -tolerating oral antibiotics or has plans for home infusion setup NA  -returns to baseline functional status not met, SBA- AX1 with GB and walker  -safe disposition plan has been identified not met

## 2024-08-07 LAB
PATH REPORT.COMMENTS IMP SPEC: NORMAL
PATH REPORT.FINAL DX SPEC: NORMAL
PATH REPORT.GROSS SPEC: NORMAL
PATH REPORT.MICROSCOPIC SPEC OTHER STN: NORMAL
PATH REPORT.RELEVANT HX SPEC: NORMAL
PHOTO IMAGE: NORMAL

## 2024-08-07 PROCEDURE — 88305 TISSUE EXAM BY PATHOLOGIST: CPT | Mod: 26

## 2024-08-08 ENCOUNTER — PATIENT OUTREACH (OUTPATIENT)
Dept: CARE COORDINATION | Facility: CLINIC | Age: 84
End: 2024-08-08
Payer: MEDICARE

## 2024-08-08 NOTE — PROGRESS NOTES
[facility]: Transitions of Care Outreach  Chief Complaint   Patient presents with    Clinic Care Coordination - Post Hospital       Most Recent Admission Date: 8/4/2024   Most Recent Admission Diagnosis: Biliary colic - K80.50  Right upper quadrant abdominal pain - R10.11     Most Recent Discharge Date: 8/6/2024   Most Recent Discharge Diagnosis: Biliary colic - K80.50  Right upper quadrant abdominal pain - R10.11  Closed fracture of one rib of right side with routine healing, subsequent encounter - S22.31XD     Transitions of Care Assessment    Discharge Assessment  How are you doing now that you are home?: Patient reports that she is doing well, she states that she will be seeing her doctor next week. No questions or concerns.  How are your symptoms? (Red Flag symptoms escalate to triage hotline per guidelines): Improved  Do you know how to contact your clinic care team if you have future questions or changes to your health status? : Yes  Does the patient have their discharge instructions? : Yes  Does the patient have questions regarding their discharge instructions? : No  Were you started on any new medications or were there changes to any of your previous medications? : Yes  Does the patient have all of their medications?: Yes  Do you have questions regarding any of your medications? : No  Do you have all of your needed medical supplies or equipment (DME)?  (i.e. oxygen tank, CPAP, cane, etc.): Yes           Follow up Plan     Follow up with primary care provider, Maria Del Rosario Reynolds, within 7 days for hospital follow- up.  The following labs/tests are recommended: Sodium, possible follow-up chest x-ray.     Discharge Follow-Up  Discharge follow up appointment scheduled in alignment with recommended follow up timeframe or Transitions of Risk Category? (Low = within 30 days; Moderate= within 14 days; High= within 7 days): Yes  Discharge Follow Up Appointment Date: 08/14/24  Discharge  Follow Up Appointment Scheduled with?: Primary Care Provider    No future appointments.    Outpatient Plan as outlined on AVS reviewed with patient.    For any urgent concerns, please contact our 24 hour nurse triage line: [phone] ([phone])       CHRIS De La Torre (she/her/hers)  Social Work Clinic Care Coordinator   [facility]  [email or web address]  [phone]

## 2025-01-16 ENCOUNTER — DOCUMENTATION ONLY (OUTPATIENT)
Dept: PHARMACY | Facility: CLINIC | Age: 85
End: 2025-01-16
Payer: MEDICARE

## 2025-01-17 PROBLEM — Z92.29 PERSONAL HISTORY OF OTHER DRUG THERAPY: Status: ACTIVE | Noted: 2025-01-17

## 2025-01-20 ENCOUNTER — INFUSION THERAPY VISIT (OUTPATIENT)
Dept: INFUSION THERAPY | Facility: CLINIC | Age: 85
End: 2025-01-20
Attending: INTERNAL MEDICINE
Payer: MEDICARE

## 2025-01-20 VITALS
OXYGEN SATURATION: 98 % | SYSTOLIC BLOOD PRESSURE: 144 MMHG | RESPIRATION RATE: 16 BRPM | HEART RATE: 71 BPM | TEMPERATURE: 98.1 F | DIASTOLIC BLOOD PRESSURE: 80 MMHG

## 2025-01-20 DIAGNOSIS — M81.0 SENILE OSTEOPOROSIS: ICD-10-CM

## 2025-01-20 DIAGNOSIS — Z92.29 PERSONAL HISTORY OF OTHER DRUG THERAPY: Primary | ICD-10-CM

## 2025-01-20 PROCEDURE — 96372 THER/PROPH/DIAG INJ SC/IM: CPT | Performed by: INTERNAL MEDICINE

## 2025-01-20 PROCEDURE — 250N000011 HC RX IP 250 OP 636: Mod: JZ | Performed by: INTERNAL MEDICINE

## 2025-01-20 RX ORDER — EPINEPHRINE 1 MG/ML
0.3 INJECTION, SOLUTION INTRAMUSCULAR; SUBCUTANEOUS EVERY 5 MIN PRN
OUTPATIENT
Start: 2025-07-14

## 2025-01-20 RX ORDER — DIPHENHYDRAMINE HYDROCHLORIDE 50 MG/ML
50 INJECTION INTRAMUSCULAR; INTRAVENOUS
Start: 2025-07-14

## 2025-01-20 RX ORDER — ALBUTEROL SULFATE 0.83 MG/ML
2.5 SOLUTION RESPIRATORY (INHALATION)
OUTPATIENT
Start: 2025-07-14

## 2025-01-20 RX ORDER — ALBUTEROL SULFATE 90 UG/1
1-2 INHALANT RESPIRATORY (INHALATION)
Start: 2025-07-14

## 2025-01-20 RX ORDER — METHYLPREDNISOLONE SODIUM SUCCINATE 125 MG/2ML
125 INJECTION INTRAMUSCULAR; INTRAVENOUS
Start: 2025-07-14

## 2025-01-20 RX ORDER — MEPERIDINE HYDROCHLORIDE 25 MG/ML
25 INJECTION INTRAMUSCULAR; INTRAVENOUS; SUBCUTANEOUS EVERY 30 MIN PRN
OUTPATIENT
Start: 2025-07-14

## 2025-01-20 RX ADMIN — DENOSUMAB 60 MG: 60 INJECTION SUBCUTANEOUS at 12:57

## 2025-01-20 NOTE — PROGRESS NOTES
Infusion Nursing Note:  Maria E Webster presents today for Prolia.    Patient seen by provider today: No   present during visit today: Not Applicable.    Note: Pt confirms taking Calcium/Vitamin D supplements.      Intravenous Access:  No Intravenous access/labs at this visit.    Treatment Conditions:  1/13/25- Creat 0.59, Ca 9.6.      Post Infusion Assessment:  Patient tolerated injection without incident.  Site patent and intact, free from redness, edema or discomfort.       Discharge Plan:   Discharge instructions reviewed with: Patient.  Patient and/or family verbalized understanding of discharge instructions and all questions answered.  AVS to patient via [employer].  Patient will return in 6 months for next appointment.   Patient discharged in stable condition accompanied by: .  Departure Mode: Ambulatory.      Kenya العراقي RN

## 2025-08-07 ENCOUNTER — MEDICAL CORRESPONDENCE (OUTPATIENT)
Dept: HEALTH INFORMATION MANAGEMENT | Facility: CLINIC | Age: 85
End: 2025-08-07
Payer: MEDICARE

## (undated) DEVICE — BLADE CLIPPER 3M 9670

## (undated) DEVICE — LINEN DRAPE 54X72" 5467

## (undated) DEVICE — SU VICRYL 2-0 CT-2 27" J333H

## (undated) DEVICE — BLADE KNIFE SURG 10 371110

## (undated) DEVICE — SU VICRYL 0 CT-1 27" J340H

## (undated) DEVICE — PAD CHUX UNDERPAD 30X36" P3036C

## (undated) DEVICE — SU PDS II 2-0 CT-2 27"  Z333H

## (undated) DEVICE — BAG CLEAR TRASH 1.3M 39X33" P4040C

## (undated) DEVICE — DECANTER VIAL 2006S

## (undated) DEVICE — DRAPE VAGI BAG 18X9" 1072

## (undated) DEVICE — SOL NACL 0.9% IRRIG 1000ML BOTTLE 2F7124

## (undated) DEVICE — SOL WATER IRRIG 3000ML BAG 2B7117

## (undated) DEVICE — LINEN HALF SHEET 5512

## (undated) DEVICE — NDL 22GA 1.5"

## (undated) DEVICE — GLOVE BIOGEL PI ULTRATOUCH SZ 6.5 41165

## (undated) DEVICE — BLADE KNIFE SURG 15 371115

## (undated) DEVICE — BLADE KNIFE SURG 11 371111

## (undated) DEVICE — ESU PENCIL W/HOLSTER E2350H

## (undated) DEVICE — DRAPE UNDER BUTTOCK 89415

## (undated) DEVICE — TUBING SUCTION 12"X1/4" N612

## (undated) DEVICE — ESU GROUND PAD ADULT W/CORD E7507

## (undated) DEVICE — CATH TRAY FOLEY SURESTEP 16FR DRAIN BAG STATOCK A899916

## (undated) DEVICE — BAG URIMETER FOLEY KIT W/16FR FOLEY

## (undated) DEVICE — PACK MAJOR LITHOTOMY RIDGES

## (undated) DEVICE — SU VICRYL 4-0 PS-2 18" UND J496H

## (undated) DEVICE — TUBING IRRIG CYSTO/BLADDER SET 81" LF 2C4040

## (undated) DEVICE — LINEN FULL SHEET 5511

## (undated) DEVICE — ADH SKIN CLOSURE PREMIERPRO EXOFIN MICOR HV 0.5ML 3471

## (undated) DEVICE — PACK MINOR LITHOTOMY RIDGES

## (undated) DEVICE — SUCTION MANIFOLD NEPTUNE 2 SYS 4 PORT 0702-020-000

## (undated) DEVICE — GLOVE BIOGEL PI MICRO INDICATOR UNDERGLOVE SZ 6.5 48965

## (undated) DEVICE — SU VICRYL 0 CT-2 27" J334H

## (undated) RX ORDER — CEFAZOLIN SODIUM/WATER 2 G/20 ML
SYRINGE (ML) INTRAVENOUS
Status: DISPENSED
Start: 2023-03-23

## (undated) RX ORDER — ACETAMINOPHEN 325 MG/1
TABLET ORAL
Status: DISPENSED
Start: 2023-03-23

## (undated) RX ORDER — DIPHENHYDRAMINE HYDROCHLORIDE 50 MG/ML
INJECTION INTRAMUSCULAR; INTRAVENOUS
Status: DISPENSED
Start: 2024-08-05

## (undated) RX ORDER — PROPOFOL 10 MG/ML
INJECTION, EMULSION INTRAVENOUS
Status: DISPENSED
Start: 2023-03-23

## (undated) RX ORDER — LIDOCAINE HYDROCHLORIDE 10 MG/ML
INJECTION, SOLUTION EPIDURAL; INFILTRATION; INTRACAUDAL; PERINEURAL
Status: DISPENSED
Start: 2023-03-23

## (undated) RX ORDER — FENTANYL CITRATE 50 UG/ML
INJECTION, SOLUTION INTRAMUSCULAR; INTRAVENOUS
Status: DISPENSED
Start: 2023-03-23

## (undated) RX ORDER — LIDOCAINE HYDROCHLORIDE AND EPINEPHRINE 10; 10 MG/ML; UG/ML
INJECTION, SOLUTION INFILTRATION; PERINEURAL
Status: DISPENSED
Start: 2023-03-23

## (undated) RX ORDER — PHENAZOPYRIDINE HYDROCHLORIDE 200 MG/1
TABLET, FILM COATED ORAL
Status: DISPENSED
Start: 2023-03-23

## (undated) RX ORDER — FENTANYL CITRATE 50 UG/ML
INJECTION, SOLUTION INTRAMUSCULAR; INTRAVENOUS
Status: DISPENSED
Start: 2024-08-05

## (undated) RX ORDER — BUPIVACAINE HYDROCHLORIDE 5 MG/ML
INJECTION, SOLUTION EPIDURAL; INTRACAUDAL
Status: DISPENSED
Start: 2023-03-23

## (undated) RX ORDER — GLYCOPYRROLATE 0.2 MG/ML
INJECTION INTRAMUSCULAR; INTRAVENOUS
Status: DISPENSED
Start: 2023-03-23

## (undated) RX ORDER — DEXAMETHASONE SODIUM PHOSPHATE 4 MG/ML
INJECTION, SOLUTION INTRA-ARTICULAR; INTRALESIONAL; INTRAMUSCULAR; INTRAVENOUS; SOFT TISSUE
Status: DISPENSED
Start: 2023-03-23

## (undated) RX ORDER — ONDANSETRON 2 MG/ML
INJECTION INTRAMUSCULAR; INTRAVENOUS
Status: DISPENSED
Start: 2023-03-23

## (undated) RX ORDER — GINSENG 100 MG
CAPSULE ORAL
Status: DISPENSED
Start: 2023-03-23